# Patient Record
Sex: FEMALE | Race: WHITE | NOT HISPANIC OR LATINO | Employment: OTHER | URBAN - METROPOLITAN AREA
[De-identification: names, ages, dates, MRNs, and addresses within clinical notes are randomized per-mention and may not be internally consistent; named-entity substitution may affect disease eponyms.]

---

## 2021-02-01 ENCOUNTER — TRANSCRIBE ORDERS (OUTPATIENT)
Dept: PREADMISSION TESTING | Facility: HOSPITAL | Age: 76
End: 2021-02-01

## 2021-02-01 DIAGNOSIS — Z01.818 OTHER SPECIFIED PRE-OPERATIVE EXAMINATION: Primary | ICD-10-CM

## 2021-02-05 ENCOUNTER — HOSPITAL ENCOUNTER (OUTPATIENT)
Dept: GENERAL RADIOLOGY | Facility: HOSPITAL | Age: 76
Discharge: HOME | End: 2021-02-05

## 2021-02-05 ENCOUNTER — PRE-ADMISSION TESTING (OUTPATIENT)
Dept: PREADMISSION TESTING | Facility: HOSPITAL | Age: 76
End: 2021-02-05

## 2021-02-05 VITALS
HEART RATE: 98 BPM | OXYGEN SATURATION: 98 % | HEIGHT: 60 IN | WEIGHT: 118.4 LBS | BODY MASS INDEX: 23.25 KG/M2 | DIASTOLIC BLOOD PRESSURE: 85 MMHG | TEMPERATURE: 98.3 F | SYSTOLIC BLOOD PRESSURE: 141 MMHG | RESPIRATION RATE: 18 BRPM

## 2021-02-05 LAB
ALBUMIN SERPL-MCNC: 4.4 G/DL (ref 3.5–5.2)
ALBUMIN/GLOB SERPL: 1.5 G/DL
ALP SERPL-CCNC: 83 U/L (ref 39–117)
ALT SERPL W P-5'-P-CCNC: 15 U/L (ref 1–33)
ANION GAP SERPL CALCULATED.3IONS-SCNC: 12.8 MMOL/L (ref 5–15)
AST SERPL-CCNC: 31 U/L (ref 1–32)
BILIRUB SERPL-MCNC: 0.6 MG/DL (ref 0–1.2)
BUN SERPL-MCNC: 8 MG/DL (ref 8–23)
BUN/CREAT SERPL: 10.8 (ref 7–25)
CALCIUM SPEC-SCNC: 10.5 MG/DL (ref 8.6–10.5)
CHLORIDE SERPL-SCNC: 94 MMOL/L (ref 98–107)
CO2 SERPL-SCNC: 26.2 MMOL/L (ref 22–29)
CREAT SERPL-MCNC: 0.74 MG/DL (ref 0.57–1)
DEPRECATED RDW RBC AUTO: 40.3 FL (ref 37–54)
ERYTHROCYTE [DISTWIDTH] IN BLOOD BY AUTOMATED COUNT: 12.1 % (ref 12.3–15.4)
GFR SERPL CREATININE-BSD FRML MDRD: 76 ML/MIN/1.73
GLOBULIN UR ELPH-MCNC: 2.9 GM/DL
GLUCOSE SERPL-MCNC: 99 MG/DL (ref 65–99)
HBA1C MFR BLD: 5.4 % (ref 4.8–5.6)
HCT VFR BLD AUTO: 36.8 % (ref 34–46.6)
HGB BLD-MCNC: 11.8 G/DL (ref 12–15.9)
INR PPP: 0.91 (ref 0.9–1.1)
MCH RBC QN AUTO: 29.4 PG (ref 26.6–33)
MCHC RBC AUTO-ENTMCNC: 32.1 G/DL (ref 31.5–35.7)
MCV RBC AUTO: 91.5 FL (ref 79–97)
PLATELET # BLD AUTO: 402 10*3/MM3 (ref 140–450)
PMV BLD AUTO: 8.8 FL (ref 6–12)
POTASSIUM SERPL-SCNC: 4.6 MMOL/L (ref 3.5–5.2)
PROT SERPL-MCNC: 7.3 G/DL (ref 6–8.5)
PROTHROMBIN TIME: 12 SECONDS (ref 11.7–14.2)
RBC # BLD AUTO: 4.02 10*6/MM3 (ref 3.77–5.28)
SODIUM SERPL-SCNC: 133 MMOL/L (ref 136–145)
WBC # BLD AUTO: 7.33 10*3/MM3 (ref 3.4–10.8)

## 2021-02-05 PROCEDURE — 93005 ELECTROCARDIOGRAM TRACING: CPT

## 2021-02-05 PROCEDURE — 83036 HEMOGLOBIN GLYCOSYLATED A1C: CPT

## 2021-02-05 PROCEDURE — 80053 COMPREHEN METABOLIC PANEL: CPT

## 2021-02-05 PROCEDURE — 73502 X-RAY EXAM HIP UNI 2-3 VIEWS: CPT

## 2021-02-05 PROCEDURE — 93010 ELECTROCARDIOGRAM REPORT: CPT | Performed by: INTERNAL MEDICINE

## 2021-02-05 PROCEDURE — 71046 X-RAY EXAM CHEST 2 VIEWS: CPT

## 2021-02-05 PROCEDURE — 85610 PROTHROMBIN TIME: CPT

## 2021-02-05 PROCEDURE — 85027 COMPLETE CBC AUTOMATED: CPT

## 2021-02-05 PROCEDURE — 36415 COLL VENOUS BLD VENIPUNCTURE: CPT

## 2021-02-05 RX ORDER — LOVASTATIN 40 MG/1
40 TABLET ORAL DAILY
COMMUNITY
Start: 2021-01-07

## 2021-02-05 RX ORDER — CHLORHEXIDINE GLUCONATE 500 MG/1
1 CLOTH TOPICAL TAKE AS DIRECTED
COMMUNITY
End: 2021-02-25 | Stop reason: HOSPADM

## 2021-02-05 RX ORDER — AMOXICILLIN 500 MG/1
2000 CAPSULE ORAL TAKE AS DIRECTED
COMMUNITY
Start: 2021-01-19

## 2021-02-05 RX ORDER — LOSARTAN POTASSIUM 100 MG/1
100 TABLET ORAL DAILY
COMMUNITY
Start: 2021-01-07

## 2021-02-05 ASSESSMENT — HOOS JR
HOOS JR SCORE: 7
HOOS JR SCORE: 67.516

## 2021-02-05 NOTE — PAT
Here for PAT appt for scheduled RTHA with Dr Rouse.  Has had several prev surgeries incl LTKA in 2019. No issues with anesthesia.  States has been feeling well lately..No recent acute illnesses...GI or UR.  Denies CP, SOA, CORADO, palpitations or syncope.    PMH includes HTN, OA, hyperlipidemia, and prev tobacco use.  Quit smoking about 5 years ago.  Lives with  and daughter will assist after surgery.  Exam reveals pleasant elderly female appearing stated age.  Skin w/d and intact.  Chest CTA bilat.  S1S2 RRR.  No edema noted.  Prelim EKG shows NSR with slow R wave progression.  All other testing pending.    Updated 2/18/21  Obtained EKG from 2018 done at [facility] for a prev surgery.  This EKG is unchanged from concepcióne previous obtained.  May proceed with surgery.

## 2021-02-05 NOTE — DISCHARGE INSTRUCTIONS
Take the following medications the morning of surgery:  NONE    Arrive to hospital on your day of surgery at 5:15 AM.      If you are on prescription narcotic pain medication to control your pain you may also take that medication the morning of surgery.    General Instructions:  • Do not eat solid food after midnight the night before surgery.  • You may drink clear liquids day of surgery but must stop at least one hour before your hospital arrival time.  • It is beneficial for you to have a clear drink that contains carbohydrates the day of surgery.  We suggest a 12 to 20 ounce bottle of Gatorade or Powerade for non-diabetic patients or a 12 to 20 ounce bottle of G2 or Powerade Zero for diabetic patients. (Pediatric patients, are not advised to drink a 12 to 20 ounce carbohydrate drink)    Clear liquids are liquids you can see through.  Nothing red in color.     Plain water                               Sports drinks  Sodas                                   Gelatin (Jell-O)  Fruit juices without pulp such as white grape juice and apple juice  Popsicles that contain no fruit or yogurt  Tea or coffee (no cream or milk added)  Gatorade / Powerade  G2 / Powerade Zero    • Infants may have breast milk up to four hours before surgery.  • Infants drinking formula may drink formula up to six hours before surgery.   • Patients who avoid smoking, chewing tobacco and alcohol for 4 weeks prior to surgery have a reduced risk of post-operative complications.  Quit smoking as many days before surgery as you can.  • Do not smoke, use chewing tobacco or drink alcohol the day of surgery.   • If applicable bring your C-PAP/ BI-PAP machine.  • Bring any papers given to you in the doctor’s office.  • Wear clean comfortable clothes.  • Do not wear contact lenses, false eyelashes or make-up.  Bring a case for your glasses.   • Bring crutches or walker if applicable.  • Remove all piercings.  Leave jewelry and any other valuables at  home.  • Hair extensions with metal clips must be removed prior to surgery.  • The Pre-Admission Testing nurse will instruct you to bring medications if unable to obtain an accurate list in Pre-Admission Testing.        If you were given a blood bank ID arm band remember to bring it with you the day of surgery.    Preventing a Surgical Site Infection:  • For 2 to 3 days before surgery, avoid shaving with a razor because the razor can irritate skin and make it easier to develop an infection.    • Any areas of open skin can increase the risk of a post-operative wound infection by allowing bacteria to enter and travel throughout the body.  Notify your surgeon if you have any skin wounds / rashes even if it is not near the expected surgical site.  The area will need assessed to determine if surgery should be delayed until it is healed.  • The night prior to surgery shower using a fresh bar of anti-bacterial soap (such as Dial) and clean washcloth.  Sleep in a clean bed with clean clothing.  Do not allow pets to sleep with you.  • Shower on the morning of surgery using a fresh bar of anti-bacterial soap (such as Dial) and clean washcloth.  Dry with a clean towel and dress in clean clothing.  • Ask your surgeon if you will be receiving antibiotics prior to surgery.  • Make sure you, your family, and all healthcare providers clean their hands with soap and water or an alcohol based hand  before caring for you or your wound.    Day of surgery:  Your arrival time is approximately two hours before your scheduled surgery time.  Upon arrival, a Pre-op nurse and Anesthesiologist will review your health history, obtain vital signs, and answer questions you may have.  The only belongings needed at this time will be a list of your home medications and if applicable your C-PAP/BI-PAP machine.  A Pre-op nurse will start an IV and you may receive medication in preparation for surgery, including something to help you relax.      Please be aware that surgery does come with discomfort.  We want to make every effort to control your discomfort so please discuss any uncontrolled symptoms with your nurse.   Your doctor will most likely have prescribed pain medications.      If you are going home after surgery you will receive individualized written care instructions before being discharged.  A responsible adult must drive you to and from the hospital on the day of your surgery and stay with you for 24 hours.  Discharge prescriptions can be filled by the hospital pharmacy during regular pharmacy hours.  If you are having surgery late in the day/evening your prescription may be e-prescribed to your pharmacy.  Please verify your pharmacy hours or chose a 24 hour pharmacy to avoid not having access to your prescription because your pharmacy has closed for the day.    If you are staying overnight following surgery, you will be transported to your hospital room following the recovery period.  [facility] has all private rooms.    If you have any questions please call Pre-Admission Testing at [phone].  Deductibles and co-payments are collected on the day of service. Please be prepared to pay the required co-pay, deductible or deposit on the day of service as defined by your plan.    Patient Education for Self-Quarantine Process    Following your COVID testing, we strongly recommend that you do not leave your home after you have been tested for COVID except to get medical care. This includes not going to work, school or to public areas.  If this is not possible for you to do please limit your activities to only required outings.  Be sure to wear a mask when you are with other people, practice social distancing and wash your hands frequently.      The following items provide additional details to keep you safe.  • Wash your hands with soap and water frequently for at least 20 seconds.   • Avoid touching your eyes, nose and mouth  with unwashed hands.  • Do not share anything - utensils, towels, food from the same bowl.   • Have your own utensils, drinking glass, dishes, towels and bedding.   • Do not have visitors.   • Do use FaceTime to stay in touch with family and friends.  • You should stay in a specific room away from others if possible.   • Stay at least 6 feet away from others in the home if you cannot have a dedicated room to yourself.   • Do not snuggle with your pet. While the CDC says there is no evidence that pets can spread COVID-19 or be infected from humans, it is probably best to avoid “petting, snuggling, being kissed or licked and sharing food (during self-quarantine)”, according to the CDC.   • Sanitize household surfaces daily. Include all high touch areas (door handles, light switches, phones, countertops, etc.)  • Do not share a bathroom with others, if possible.   • Wear a mask around others in your home if you are unable to stay in a separate room or 6 feet apart. If  you are unable to wear a mask, have your family member wear a mask if they must be within 6 feet of you.   Call your surgeon immediately if you experience any of the following symptoms:  • Sore Throat  • Shortness of Breath or difficulty breathing  • Cough  • Chills  • Body soreness or muscle pain  • Headache  • Fever  • New loss of taste or smell  • Do not arrive for your surgery ill.  Your procedure will need to be rescheduled to another time.  You will need to call your physician before the day of surgery to avoid any unnecessary exposure to hospital staff as well as other patients.    CHLORHEXIDINE CLOTH INSTRUCTIONS  The morning of surgery follow these instructions using the Chlorhexidine cloths you've been given.  These steps reduce bacteria on the body.  Do not use the cloths near your eyes, ears mouth, genitalia or on open wounds.  Throw the cloths away after use but do not try to flush them down a toilet.      • Open and remove one cloth at a  time from the package.    • Leave the cloth unfolded and begin the bathing.  • Massage the skin with the cloths using gentle pressure to remove bacteria.  Do not scrub harshly.   • Follow the steps below with one 2% CHG cloth per area (6 total cloths).  • One cloth for neck, shoulders and chest.  • One cloth for both arms, hands, fingers and underarms (do underarms last).  • One cloth for the abdomen followed by groin.  • One cloth for right leg and foot including between the toes.  • One cloth for left leg and foot including between the toes.  • The last cloth is to be used for the back of the neck, back and buttocks.    Allow the CHG to air dry 3 minutes on the skin which will give it time to work and decrease the chance of irritation.  The skin may feel sticky until it is dry.  Do not rinse with water or any other liquid or you will lose the beneficial effects of the CHG.  If mild skin irritation occurs, do rinse the skin to remove the CHG.  Report this to the nurse at time of admission.  Do not apply lotions, creams, ointments, deodorants or perfumes after using the clothes. Dress in clean clothes before coming to the hospital.    BACTROBAN NASAL OINTMENT  There are many germs normally in your nose. Bactroban is an ointment that will help reduce these germs. Please follow these instructions for Bactroban use:      ____The day before surgery in the morning  Date________    ____The day before surgery in the evening              Date________    ____The day of surgery in the morning    Date________    **Squirt ½ package of Bactroban Ointment onto a cotton applicator and apply to inside of 1st nostril.  Squirt the remaining Bactroban and apply to the inside of the other nostril.

## 2021-02-06 LAB — QT INTERVAL: 350 MS

## 2021-02-19 DIAGNOSIS — Z01.818 PRE-OPERATIVE CLEARANCE: Primary | ICD-10-CM

## 2021-02-19 DIAGNOSIS — R94.31 ABNORMAL EKG: ICD-10-CM

## 2021-02-22 ENCOUNTER — HOSPITAL ENCOUNTER (OUTPATIENT)
Dept: CARDIOLOGY | Facility: HOSPITAL | Age: 76
Discharge: HOME | End: 2021-02-22

## 2021-02-22 ENCOUNTER — LAB (OUTPATIENT)
Dept: LAB | Facility: HOSPITAL | Age: 76
End: 2021-02-22

## 2021-02-22 ENCOUNTER — HOSPITAL ENCOUNTER (OUTPATIENT)
Dept: CARDIOLOGY | Facility: HOSPITAL | Age: 76
Discharge: HOME | End: 2021-02-22
Admitting: INTERNAL MEDICINE

## 2021-02-22 ENCOUNTER — OFFICE VISIT (OUTPATIENT)
Dept: CARDIOLOGY | Facility: CLINIC | Age: 76
End: 2021-02-22

## 2021-02-22 VITALS
SYSTOLIC BLOOD PRESSURE: 178 MMHG | DIASTOLIC BLOOD PRESSURE: 83 MMHG | HEART RATE: 97 BPM | BODY MASS INDEX: 22.58 KG/M2 | WEIGHT: 115 LBS | HEIGHT: 60 IN

## 2021-02-22 VITALS
WEIGHT: 118 LBS | DIASTOLIC BLOOD PRESSURE: 77 MMHG | HEART RATE: 90 BPM | BODY MASS INDEX: 23.16 KG/M2 | HEIGHT: 60 IN | SYSTOLIC BLOOD PRESSURE: 128 MMHG

## 2021-02-22 VITALS
HEART RATE: 90 BPM | DIASTOLIC BLOOD PRESSURE: 60 MMHG | BODY MASS INDEX: 23.16 KG/M2 | SYSTOLIC BLOOD PRESSURE: 130 MMHG | RESPIRATION RATE: 16 BRPM | HEIGHT: 60 IN | WEIGHT: 118 LBS | OXYGEN SATURATION: 100 %

## 2021-02-22 DIAGNOSIS — R94.31 ABNORMAL EKG: ICD-10-CM

## 2021-02-22 DIAGNOSIS — Z01.818 PRE-OPERATIVE CLEARANCE: ICD-10-CM

## 2021-02-22 DIAGNOSIS — Z01.818 OTHER SPECIFIED PRE-OPERATIVE EXAMINATION: ICD-10-CM

## 2021-02-22 DIAGNOSIS — Z01.810 PREOP CARDIOVASCULAR EXAM: Primary | ICD-10-CM

## 2021-02-22 LAB
AORTIC DIMENSIONLESS INDEX: 0.8 (DI)
ASCENDING AORTA: 2.7 CM
BH CV ECHO MEAS - ACS: 1.6 CM
BH CV ECHO MEAS - AO MAX PG (FULL): 3.3 MMHG
BH CV ECHO MEAS - AO MAX PG: 6.2 MMHG
BH CV ECHO MEAS - AO MEAN PG (FULL): 1.9 MMHG
BH CV ECHO MEAS - AO MEAN PG: 3.3 MMHG
BH CV ECHO MEAS - AO ROOT AREA (BSA CORRECTED): 1.9
BH CV ECHO MEAS - AO ROOT AREA: 6.1 CM^2
BH CV ECHO MEAS - AO ROOT DIAM: 2.8 CM
BH CV ECHO MEAS - AO V2 MAX: 124.9 CM/SEC
BH CV ECHO MEAS - AO V2 MEAN: 85.6 CM/SEC
BH CV ECHO MEAS - AO V2 VTI: 26 CM
BH CV ECHO MEAS - ASC AORTA: 2.7 CM
BH CV ECHO MEAS - AVA(I,A): 1.9 CM^2
BH CV ECHO MEAS - AVA(I,D): 1.9 CM^2
BH CV ECHO MEAS - AVA(V,A): 1.7 CM^2
BH CV ECHO MEAS - AVA(V,D): 1.7 CM^2
BH CV ECHO MEAS - BSA(HAYCOCK): 1.5 M^2
BH CV ECHO MEAS - BSA: 1.5 M^2
BH CV ECHO MEAS - BZI_BMI: 23 KILOGRAMS/M^2
BH CV ECHO MEAS - BZI_METRIC_HEIGHT: 152.4 CM
BH CV ECHO MEAS - BZI_METRIC_WEIGHT: 53.5 KG
BH CV ECHO MEAS - EDV(CUBED): 71.8 ML
BH CV ECHO MEAS - EDV(MOD-SP2): 46 ML
BH CV ECHO MEAS - EDV(MOD-SP4): 45 ML
BH CV ECHO MEAS - EDV(TEICH): 76.7 ML
BH CV ECHO MEAS - EF(CUBED): 75.1 %
BH CV ECHO MEAS - EF(MOD-BP): 66.2 %
BH CV ECHO MEAS - EF(MOD-SP2): 69.6 %
BH CV ECHO MEAS - EF(MOD-SP4): 66.7 %
BH CV ECHO MEAS - EF(TEICH): 67.5 %
BH CV ECHO MEAS - ESV(CUBED): 17.9 ML
BH CV ECHO MEAS - ESV(MOD-SP2): 14 ML
BH CV ECHO MEAS - ESV(MOD-SP4): 15 ML
BH CV ECHO MEAS - ESV(TEICH): 24.9 ML
BH CV ECHO MEAS - FS: 37.1 %
BH CV ECHO MEAS - IVS/LVPW: 0.94
BH CV ECHO MEAS - IVSD: 0.73 CM
BH CV ECHO MEAS - LAT PEAK E' VEL: 7.6 CM/SEC
BH CV ECHO MEAS - LV DIASTOLIC VOL/BSA (35-75): 30.2 ML/M^2
BH CV ECHO MEAS - LV MASS(C)D: 92.2 GRAMS
BH CV ECHO MEAS - LV MASS(C)DI: 61.8 GRAMS/M^2
BH CV ECHO MEAS - LV MAX PG: 2.9 MMHG
BH CV ECHO MEAS - LV MEAN PG: 1.4 MMHG
BH CV ECHO MEAS - LV SYSTOLIC VOL/BSA (12-30): 10.1 ML/M^2
BH CV ECHO MEAS - LV V1 MAX: 85.4 CM/SEC
BH CV ECHO MEAS - LV V1 MEAN: 55.8 CM/SEC
BH CV ECHO MEAS - LV V1 VTI: 19.7 CM
BH CV ECHO MEAS - LVIDD: 4.2 CM
BH CV ECHO MEAS - LVIDS: 2.6 CM
BH CV ECHO MEAS - LVLD AP2: 6.1 CM
BH CV ECHO MEAS - LVLD AP4: 6.1 CM
BH CV ECHO MEAS - LVLS AP2: 4.5 CM
BH CV ECHO MEAS - LVLS AP4: 5.1 CM
BH CV ECHO MEAS - LVOT AREA (M): 2.5 CM^2
BH CV ECHO MEAS - LVOT AREA: 2.5 CM^2
BH CV ECHO MEAS - LVOT DIAM: 1.8 CM
BH CV ECHO MEAS - LVPWD: 0.78 CM
BH CV ECHO MEAS - MED PEAK E' VEL: 6.6 CM/SEC
BH CV ECHO MEAS - MV A DUR: 0.11 SEC
BH CV ECHO MEAS - MV A MAX VEL: 98.5 CM/SEC
BH CV ECHO MEAS - MV DEC SLOPE: 411.2 CM/SEC^2
BH CV ECHO MEAS - MV DEC TIME: 182 SEC
BH CV ECHO MEAS - MV E MAX VEL: 67.4 CM/SEC
BH CV ECHO MEAS - MV E/A: 0.68
BH CV ECHO MEAS - MV MAX PG: 6.1 MMHG
BH CV ECHO MEAS - MV MEAN PG: 2.3 MMHG
BH CV ECHO MEAS - MV P1/2T MAX VEL: 99.1 CM/SEC
BH CV ECHO MEAS - MV P1/2T: 70.6 MSEC
BH CV ECHO MEAS - MV V2 MAX: 123.5 CM/SEC
BH CV ECHO MEAS - MV V2 MEAN: 71.1 CM/SEC
BH CV ECHO MEAS - MV V2 VTI: 17.6 CM
BH CV ECHO MEAS - MVA P1/2T LCG: 2.2 CM^2
BH CV ECHO MEAS - MVA(P1/2T): 3.1 CM^2
BH CV ECHO MEAS - MVA(VTI): 2.8 CM^2
BH CV ECHO MEAS - PA MAX PG (FULL): 1.4 MMHG
BH CV ECHO MEAS - PA MAX PG: 4.7 MMHG
BH CV ECHO MEAS - PA V2 MAX: 108.6 CM/SEC
BH CV ECHO MEAS - PULM A REVS DUR: 0.12 SEC
BH CV ECHO MEAS - PULM A REVS VEL: 38.8 CM/SEC
BH CV ECHO MEAS - PULM DIAS VEL: 37.8 CM/SEC
BH CV ECHO MEAS - PULM S/D: 1.6
BH CV ECHO MEAS - PULM SYS VEL: 59.2 CM/SEC
BH CV ECHO MEAS - PVA(V,A): 2 CM^2
BH CV ECHO MEAS - PVA(V,D): 2 CM^2
BH CV ECHO MEAS - QP/QS: 0.81
BH CV ECHO MEAS - RAP SYSTOLE: 3 MMHG
BH CV ECHO MEAS - RV MAX PG: 3.3 MMHG
BH CV ECHO MEAS - RV MEAN PG: 1.6 MMHG
BH CV ECHO MEAS - RV V1 MAX: 91.2 CM/SEC
BH CV ECHO MEAS - RV V1 MEAN: 58.5 CM/SEC
BH CV ECHO MEAS - RV V1 VTI: 17.3 CM
BH CV ECHO MEAS - RVOT AREA: 2.4 CM^2
BH CV ECHO MEAS - RVOT DIAM: 1.7 CM
BH CV ECHO MEAS - RVSP: 36 MMHG
BH CV ECHO MEAS - SI(AO): 106.6 ML/M^2
BH CV ECHO MEAS - SI(CUBED): 36.2 ML/M^2
BH CV ECHO MEAS - SI(LVOT): 33.6 ML/M^2
BH CV ECHO MEAS - SI(MOD-SP2): 21.5 ML/M^2
BH CV ECHO MEAS - SI(MOD-SP4): 20.1 ML/M^2
BH CV ECHO MEAS - SI(TEICH): 34.7 ML/M^2
BH CV ECHO MEAS - SV(AO): 159.1 ML
BH CV ECHO MEAS - SV(CUBED): 53.9 ML
BH CV ECHO MEAS - SV(LVOT): 50.1 ML
BH CV ECHO MEAS - SV(MOD-SP2): 32 ML
BH CV ECHO MEAS - SV(MOD-SP4): 30 ML
BH CV ECHO MEAS - SV(RVOT): 40.8 ML
BH CV ECHO MEAS - SV(TEICH): 51.7 ML
BH CV ECHO MEAS - TAPSE (>1.6): 2.2 CM
BH CV ECHO MEAS - TR MAX PG: 33 MMHG
BH CV ECHO MEAS - TR MAX VEL: 286.4 CM/SEC
BH CV ECHO MEASUREMENTS AVERAGE E/E' RATIO: 9.49
BH CV STRESS BP STAGE 1: NORMAL
BH CV STRESS COMMENTS STAGE 1: NORMAL
BH CV STRESS DOSE REGADENOSON STAGE 1: 0.4
BH CV STRESS DURATION MIN STAGE 1: 1
BH CV STRESS DURATION SEC STAGE 1: 0
BH CV STRESS HR STAGE 1: 129
BH CV STRESS O2 STAGE 1: 100
BH CV STRESS PROTOCOL 1: NORMAL
BH CV STRESS RECOVERY BP: NORMAL MMHG
BH CV STRESS RECOVERY HR: 102 BPM
BH CV STRESS RECOVERY O2: 100 %
BH CV STRESS STAGE 1: 1
BH CV XLRA - TDI S': 16.3 CM/SEC
LEFT ATRIUM VOLUME INDEX: 14.4 ML/M2
LV EF NUC BP: 60 %
MAXIMAL PREDICTED HEART RATE: 143 BPM
MAXIMAL PREDICTED HEART RATE: 143 BPM
PERCENT MAX PREDICTED HR: 90.21 %
SINUS: 2.4 CM
STJ: 2.2 CM
STRESS BASELINE BP: NORMAL MMHG
STRESS BASELINE HR: 89 BPM
STRESS O2 SAT REST: 100 %
STRESS PERCENT HR: 106 %
STRESS POST ESTIMATED WORKLOAD: 1 METS
STRESS POST EXERCISE DUR MIN: 1 MIN
STRESS POST EXERCISE DUR SEC: 0 SEC
STRESS POST O2 SAT PEAK: 100 %
STRESS POST PEAK BP: NORMAL MMHG
STRESS POST PEAK HR: 129 BPM
STRESS TARGET HR: 122 BPM
STRESS TARGET HR: 122 BPM

## 2021-02-22 PROCEDURE — 93306 TTE W/DOPPLER COMPLETE: CPT

## 2021-02-22 PROCEDURE — 93018 CV STRESS TEST I&R ONLY: CPT | Performed by: INTERNAL MEDICINE

## 2021-02-22 PROCEDURE — 78452 HT MUSCLE IMAGE SPECT MULT: CPT

## 2021-02-22 PROCEDURE — 25010000002 REGADENOSON 0.4 MG/5ML SOLUTION: Performed by: INTERNAL MEDICINE

## 2021-02-22 PROCEDURE — 93017 CV STRESS TEST TRACING ONLY: CPT

## 2021-02-22 PROCEDURE — U0004 COV-19 TEST NON-CDC HGH THRU: HCPCS

## 2021-02-22 PROCEDURE — 0 TECHNETIUM SESTAMIBI: Performed by: INTERNAL MEDICINE

## 2021-02-22 PROCEDURE — 99203 OFFICE O/P NEW LOW 30 MIN: CPT | Performed by: INTERNAL MEDICINE

## 2021-02-22 PROCEDURE — 93016 CV STRESS TEST SUPVJ ONLY: CPT | Performed by: INTERNAL MEDICINE

## 2021-02-22 PROCEDURE — C9803 HOPD COVID-19 SPEC COLLECT: HCPCS

## 2021-02-22 PROCEDURE — 93306 TTE W/DOPPLER COMPLETE: CPT | Performed by: INTERNAL MEDICINE

## 2021-02-22 PROCEDURE — A9500 TC99M SESTAMIBI: HCPCS | Performed by: INTERNAL MEDICINE

## 2021-02-22 PROCEDURE — 78452 HT MUSCLE IMAGE SPECT MULT: CPT | Performed by: INTERNAL MEDICINE

## 2021-02-22 RX ADMIN — REGADENOSON 0.4 MG: 0.08 INJECTION, SOLUTION INTRAVENOUS at 09:55

## 2021-02-22 RX ADMIN — TECHNETIUM TC 99M SESTAMIBI 1 DOSE: 1 INJECTION INTRAVENOUS at 09:55

## 2021-02-22 RX ADMIN — TECHNETIUM TC 99M SESTAMIBI 1 DOSE: 1 INJECTION INTRAVENOUS at 07:30

## 2021-02-22 NOTE — PROGRESS NOTES
"NP   RESULTS  CARDIAC CLEARANCE   Subjective:        Lavern Mcduffie is a 75 y.o. female who here for follow up    CC  SURG CLEARANCE  ABNORMAL EKG  HPI  75-year-old female here for the surgical clearance for the hip surgery has abnormal EKG denies any chest pains or tightness in the chest     Problems Addressed this Visit        Other    Preop cardiovascular exam - Primary    Abnormal EKG      Diagnoses       Codes Comments    Preop cardiovascular exam    -  Primary ICD-10-CM: Z01.810  ICD-9-CM: V72.81     Abnormal EKG     ICD-10-CM: R94.31  ICD-9-CM: 794.31         .    The following portions of the patient's history were reviewed and updated as appropriate: allergies, current medications, past family history, past medical history, past social history, past surgical history and problem list.    Past Medical History:   Diagnosis Date   • Arthritis    • Hyperlipidemia    • Hypertension    • Right hip pain      reports that she quit smoking about 5 years ago. Her smoking use included cigarettes. She has a 15.00 pack-year smoking history. She has never used smokeless tobacco. She reports current alcohol use. She reports that she does not use drugs.   Family History   Problem Relation Age of Onset   • Heart attack Father    • Heart disease Father    • Malig Hyperthermia Neg Hx        Review of Systems  Constitutional: No wt loss, fever, fatigue  Gastrointestinal: No nausea, abdominal pain  Behavioral/Psych: No insomnia or anxiety   Cardiovascular no chest pains or tightness in the chest  Objective:       Physical Exam  /83   Pulse 97   Ht 152.4 cm (60\")   Wt 52.2 kg (115 lb)   BMI 22.46 kg/m²   General appearance: No acute changes   Neck: Trachea midline; NECK, supple, no thyromegaly or lymphadenopathy   Lungs: Normal size and shape, normal breath sounds, equal distribution of air, no rales and rhonchi   CV: S1-S2 regular, no murmurs, no rub, no gallop   Abdomen: Soft, non-tender; no masses , no abnormal " abdominal sounds   Extremities: No deformity , normal color , no peripheral edema   Skin: Normal temperature, turgor and texture; no rash, ulcers          Procedures      Echocardiogram:        Current Outpatient Medications:   •  amoxicillin (AMOXIL) 500 MG capsule, Take 2,000 mg by mouth Take As Directed. PRIOR TO DENTAL APPT, Disp: , Rfl:   •  Chlorhexidine Gluconate Cloth 2 % pads, Apply 1 application topically Take As Directed. Use as directed prior to OR, Disp: , Rfl:   •  losartan (COZAAR) 100 MG tablet, Take 100 mg by mouth Daily., Disp: , Rfl:   •  lovastatin (MEVACOR) 40 MG tablet, Take 40 mg by mouth Daily., Disp: , Rfl:   •  mupirocin (BACTROBAN) 2 % nasal ointment, 1 application into the nostril(s) as directed by provider Take As Directed. Use as directed prior to OR, Disp: , Rfl:   No current facility-administered medications for this visit.    Assessment:        Patient Active Problem List   Diagnosis   • H/O total hip arthroplasty   • DJD (degenerative joint disease)   • Preop cardiovascular exam   • Abnormal EKG               Plan:            ICD-10-CM ICD-9-CM   1. Preop cardiovascular exam  Z01.810 V72.81   2. Abnormal EKG  R94.31 794.31     1. Preop cardiovascular exam  Stress test and echo normal    2. Abnormal EKG  Stress test and the echo normal       Lavern M Peytonchikakenneth seen and examined with no clinical signs of angina or chf, pt is cleared for surgery with non modifiable risk factors.  Lavern Mcduffie has been advised to take cardiac meds with sip of water on the day of surgery.    Please use beta blocker for tachycardia perioperatively    Anticoagulation to be managed appropriately    Watch for chest pain, shortness of breath, palpitations, arrhythmias, and significant change in the blood pressure perioperatively,     Please check EKG preop and postop if any questions, notify us if any change in patient's cardiovascular conditions    Specificity and sensitivity of the stress test/  cardiac workup has been explained. Pt has been explained if  Symptoms continue please go to ER, and further w/p will be required.    Also explained this does not rule out coronary artery disease or the future events, continue to emphasize on risk reductions for coronary artery disease    Pt also advised to contact PCP for other causes of symptoms    SEE IN 1 YR  COUNSELING:    Lavern Jimenezounseling was given to patient for the following topics: diagnostic results, risk factor reductions, impressions, risks and benefits of treatment options and importance of treatment compliance .       SMOKING COUNSELING:    [unfilled]    Dictated using Dragon dictation

## 2021-02-23 LAB — SARS-COV-2 ORF1AB RESP QL NAA+PROBE: NOT DETECTED

## 2021-02-24 ENCOUNTER — APPOINTMENT (OUTPATIENT)
Dept: GENERAL RADIOLOGY | Facility: HOSPITAL | Age: 76
End: 2021-02-24

## 2021-02-24 ENCOUNTER — HOSPITAL ENCOUNTER (OUTPATIENT)
Facility: HOSPITAL | Age: 76
Discharge: HOME HEALTH | End: 2021-02-25
Attending: ORTHOPAEDIC SURGERY | Admitting: ORTHOPAEDIC SURGERY

## 2021-02-24 ENCOUNTER — ANESTHESIA EVENT (OUTPATIENT)
Dept: PERIOP | Facility: HOSPITAL | Age: 76
End: 2021-02-24

## 2021-02-24 ENCOUNTER — ANESTHESIA (OUTPATIENT)
Dept: PERIOP | Facility: HOSPITAL | Age: 76
End: 2021-02-24

## 2021-02-24 DIAGNOSIS — Z96.641 HISTORY OF TOTAL RIGHT HIP REPLACEMENT: Primary | ICD-10-CM

## 2021-02-24 PROBLEM — M19.90 DJD (DEGENERATIVE JOINT DISEASE): Status: ACTIVE | Noted: 2021-02-24

## 2021-02-24 PROBLEM — Z96.649 H/O TOTAL HIP ARTHROPLASTY: Status: ACTIVE | Noted: 2021-02-24

## 2021-02-24 PROCEDURE — 25010000002 PHENYLEPHRINE PER 1 ML: Performed by: NURSE ANESTHETIST, CERTIFIED REGISTERED

## 2021-02-24 PROCEDURE — 25010000002 KETOROLAC TROMETHAMINE PER 15 MG: Performed by: ORTHOPAEDIC SURGERY

## 2021-02-24 PROCEDURE — 25010000002 EPINEPHRINE 30 MG/30ML SOLUTION: Performed by: ORTHOPAEDIC SURGERY

## 2021-02-24 PROCEDURE — C1889 IMPLANT/INSERT DEVICE, NOC: HCPCS | Performed by: ORTHOPAEDIC SURGERY

## 2021-02-24 PROCEDURE — 97161 PT EVAL LOW COMPLEX 20 MIN: CPT

## 2021-02-24 PROCEDURE — C1776 JOINT DEVICE (IMPLANTABLE): HCPCS | Performed by: ORTHOPAEDIC SURGERY

## 2021-02-24 PROCEDURE — 25010000002 DEXAMETHASONE PER 1 MG: Performed by: NURSE ANESTHETIST, CERTIFIED REGISTERED

## 2021-02-24 PROCEDURE — A9270 NON-COVERED ITEM OR SERVICE: HCPCS | Performed by: ORTHOPAEDIC SURGERY

## 2021-02-24 PROCEDURE — G0378 HOSPITAL OBSERVATION PER HR: HCPCS

## 2021-02-24 PROCEDURE — 97110 THERAPEUTIC EXERCISES: CPT

## 2021-02-24 PROCEDURE — C1713 ANCHOR/SCREW BN/BN,TIS/BN: HCPCS | Performed by: ORTHOPAEDIC SURGERY

## 2021-02-24 PROCEDURE — 25010000002 ONDANSETRON PER 1 MG: Performed by: NURSE ANESTHETIST, CERTIFIED REGISTERED

## 2021-02-24 PROCEDURE — 73501 X-RAY EXAM HIP UNI 1 VIEW: CPT

## 2021-02-24 PROCEDURE — 25010000003 CEFAZOLIN IN DEXTROSE 2-4 GM/100ML-% SOLUTION: Performed by: ORTHOPAEDIC SURGERY

## 2021-02-24 PROCEDURE — 63710000001 HYDROCODONE-ACETAMINOPHEN 7.5-325 MG TABLET: Performed by: ORTHOPAEDIC SURGERY

## 2021-02-24 PROCEDURE — 63710000001 POVIDONE-IODINE 10 % SOLUTION 15 ML BOTTLE: Performed by: ORTHOPAEDIC SURGERY

## 2021-02-24 PROCEDURE — 25010000002 PROPOFOL 10 MG/ML EMULSION: Performed by: NURSE ANESTHETIST, CERTIFIED REGISTERED

## 2021-02-24 PROCEDURE — 72170 X-RAY EXAM OF PELVIS: CPT

## 2021-02-24 PROCEDURE — 76000 FLUOROSCOPY <1 HR PHYS/QHP: CPT

## 2021-02-24 PROCEDURE — 25010000002 CLONIDINE PER 1 MG: Performed by: ORTHOPAEDIC SURGERY

## 2021-02-24 PROCEDURE — 25010000002 ROPIVACAINE PER 1 MG: Performed by: ORTHOPAEDIC SURGERY

## 2021-02-24 PROCEDURE — 63710000001 ASPIRIN 81 MG TABLET DELAYED-RELEASE: Performed by: ORTHOPAEDIC SURGERY

## 2021-02-24 DEVICE — AMISTEM-P STD STEM #1
Type: IMPLANTABLE DEVICE | Site: HIP | Status: FUNCTIONAL
Brand: AMISTEM-P

## 2021-02-24 DEVICE — KNOTLESS TISSUE CONTROL DEVICE, UNDYED UNIDIRECTIONAL (ANTIBACTERIAL) SYNTHETIC ABSORBABLE DEVICE
Type: IMPLANTABLE DEVICE | Site: HIP | Status: FUNCTIONAL
Brand: STRATAFIX

## 2021-02-24 DEVICE — IMPLANTABLE DEVICE: Type: IMPLANTABLE DEVICE | Site: HIP | Status: FUNCTIONAL

## 2021-02-24 DEVICE — FEMORAL HEAD Ø 36 SIZE M
Type: IMPLANTABLE DEVICE | Site: HIP | Status: FUNCTIONAL
Brand: MECTACER BIOLOX DELTA FEMORAL BALL HEAD

## 2021-02-24 DEVICE — ACETABULAR SHELL Ø54 TWO-HOLE
Type: IMPLANTABLE DEVICE | Site: HIP | Status: FUNCTIONAL
Brand: MPACT 3D METAL

## 2021-02-24 DEVICE — CANCELLOUS BONE SCREW FLAT HEAD Ø 6,5 L30
Type: IMPLANTABLE DEVICE | Site: HIP | Status: FUNCTIONAL
Brand: MPACT ACETABULAR SYSTEM

## 2021-02-24 DEVICE — FLAT PE  HC LINER Ø 36 / E
Type: IMPLANTABLE DEVICE | Site: HIP | Status: FUNCTIONAL
Brand: MPACT ACETABULAR SYSTEM

## 2021-02-24 DEVICE — VIOLET ANTIBACTERIAL POLYDIOXANONE, KNOTLESS TISSUE CONTROL DEVICE
Type: IMPLANTABLE DEVICE | Site: HIP | Status: FUNCTIONAL
Brand: STRATAFIX

## 2021-02-24 RX ORDER — FLUMAZENIL 0.1 MG/ML
0.2 INJECTION INTRAVENOUS AS NEEDED
Status: DISCONTINUED | OUTPATIENT
Start: 2021-02-24 | End: 2021-02-24 | Stop reason: HOSPADM

## 2021-02-24 RX ORDER — TRANEXAMIC ACID 100 MG/ML
INJECTION, SOLUTION INTRAVENOUS AS NEEDED
Status: DISCONTINUED | OUTPATIENT
Start: 2021-02-24 | End: 2021-02-24 | Stop reason: SURG

## 2021-02-24 RX ORDER — CEFAZOLIN SODIUM 2 G/100ML
2 INJECTION, SOLUTION INTRAVENOUS ONCE
Status: COMPLETED | OUTPATIENT
Start: 2021-02-24 | End: 2021-02-24

## 2021-02-24 RX ORDER — KETOROLAC TROMETHAMINE 30 MG/ML
15 INJECTION, SOLUTION INTRAMUSCULAR; INTRAVENOUS EVERY 6 HOURS
Status: COMPLETED | OUTPATIENT
Start: 2021-02-24 | End: 2021-02-25

## 2021-02-24 RX ORDER — SODIUM CHLORIDE 9 MG/ML
INJECTION, SOLUTION INTRAVENOUS CONTINUOUS PRN
Status: COMPLETED | OUTPATIENT
Start: 2021-02-24 | End: 2021-02-24

## 2021-02-24 RX ORDER — HYDRALAZINE HYDROCHLORIDE 20 MG/ML
5 INJECTION INTRAMUSCULAR; INTRAVENOUS
Status: DISCONTINUED | OUTPATIENT
Start: 2021-02-24 | End: 2021-02-24 | Stop reason: HOSPADM

## 2021-02-24 RX ORDER — HYDROCODONE BITARTRATE AND ACETAMINOPHEN 7.5; 325 MG/1; MG/1
2 TABLET ORAL EVERY 4 HOURS PRN
Status: DISCONTINUED | OUTPATIENT
Start: 2021-02-24 | End: 2021-02-25 | Stop reason: HOSPADM

## 2021-02-24 RX ORDER — SODIUM CHLORIDE 0.9 % (FLUSH) 0.9 %
3-10 SYRINGE (ML) INJECTION AS NEEDED
Status: DISCONTINUED | OUTPATIENT
Start: 2021-02-24 | End: 2021-02-25 | Stop reason: HOSPADM

## 2021-02-24 RX ORDER — MIDAZOLAM HYDROCHLORIDE 1 MG/ML
1 INJECTION INTRAMUSCULAR; INTRAVENOUS
Status: DISCONTINUED | OUTPATIENT
Start: 2021-02-24 | End: 2021-02-24 | Stop reason: HOSPADM

## 2021-02-24 RX ORDER — PROPOFOL 10 MG/ML
VIAL (ML) INTRAVENOUS CONTINUOUS PRN
Status: DISCONTINUED | OUTPATIENT
Start: 2021-02-24 | End: 2021-02-24 | Stop reason: SURG

## 2021-02-24 RX ORDER — TRAMADOL HYDROCHLORIDE 50 MG/1
50 TABLET ORAL EVERY 8 HOURS PRN
Status: DISCONTINUED | OUTPATIENT
Start: 2021-02-24 | End: 2021-02-25 | Stop reason: HOSPADM

## 2021-02-24 RX ORDER — MIDAZOLAM HYDROCHLORIDE 1 MG/ML
0.5 INJECTION INTRAMUSCULAR; INTRAVENOUS
Status: DISCONTINUED | OUTPATIENT
Start: 2021-02-24 | End: 2021-02-24 | Stop reason: HOSPADM

## 2021-02-24 RX ORDER — SODIUM CHLORIDE 0.9 % (FLUSH) 0.9 %
3 SYRINGE (ML) INJECTION EVERY 12 HOURS SCHEDULED
Status: DISCONTINUED | OUTPATIENT
Start: 2021-02-24 | End: 2021-02-25 | Stop reason: HOSPADM

## 2021-02-24 RX ORDER — ACETAMINOPHEN 325 MG/1
325 TABLET ORAL EVERY 4 HOURS PRN
Status: DISCONTINUED | OUTPATIENT
Start: 2021-02-24 | End: 2021-02-25 | Stop reason: HOSPADM

## 2021-02-24 RX ORDER — ASPIRIN 81 MG/1
81 TABLET ORAL EVERY 12 HOURS SCHEDULED
Status: DISCONTINUED | OUTPATIENT
Start: 2021-02-24 | End: 2021-02-25 | Stop reason: HOSPADM

## 2021-02-24 RX ORDER — FENTANYL CITRATE 50 UG/ML
50 INJECTION, SOLUTION INTRAMUSCULAR; INTRAVENOUS
Status: DISCONTINUED | OUTPATIENT
Start: 2021-02-24 | End: 2021-02-24 | Stop reason: HOSPADM

## 2021-02-24 RX ORDER — NALOXONE HCL 0.4 MG/ML
0.2 VIAL (ML) INJECTION AS NEEDED
Status: DISCONTINUED | OUTPATIENT
Start: 2021-02-24 | End: 2021-02-24 | Stop reason: HOSPADM

## 2021-02-24 RX ORDER — SODIUM CHLORIDE, SODIUM LACTATE, POTASSIUM CHLORIDE, CALCIUM CHLORIDE 600; 310; 30; 20 MG/100ML; MG/100ML; MG/100ML; MG/100ML
9 INJECTION, SOLUTION INTRAVENOUS CONTINUOUS PRN
Status: DISCONTINUED | OUTPATIENT
Start: 2021-02-24 | End: 2021-02-24 | Stop reason: HOSPADM

## 2021-02-24 RX ORDER — ONDANSETRON 4 MG/1
4 TABLET, FILM COATED ORAL EVERY 6 HOURS PRN
Status: DISCONTINUED | OUTPATIENT
Start: 2021-02-24 | End: 2021-02-25 | Stop reason: HOSPADM

## 2021-02-24 RX ORDER — SODIUM CHLORIDE 0.9 % (FLUSH) 0.9 %
10 SYRINGE (ML) INJECTION EVERY 12 HOURS SCHEDULED
Status: DISCONTINUED | OUTPATIENT
Start: 2021-02-24 | End: 2021-02-24 | Stop reason: HOSPADM

## 2021-02-24 RX ORDER — DEXAMETHASONE SODIUM PHOSPHATE 4 MG/ML
INJECTION, SOLUTION INTRA-ARTICULAR; INTRALESIONAL; INTRAMUSCULAR; INTRAVENOUS; SOFT TISSUE AS NEEDED
Status: DISCONTINUED | OUTPATIENT
Start: 2021-02-24 | End: 2021-02-24 | Stop reason: SURG

## 2021-02-24 RX ORDER — ONDANSETRON 2 MG/ML
4 INJECTION INTRAMUSCULAR; INTRAVENOUS EVERY 6 HOURS PRN
Status: DISCONTINUED | OUTPATIENT
Start: 2021-02-24 | End: 2021-02-25 | Stop reason: HOSPADM

## 2021-02-24 RX ORDER — CEFAZOLIN SODIUM 2 G/100ML
2 INJECTION, SOLUTION INTRAVENOUS EVERY 8 HOURS
Status: COMPLETED | OUTPATIENT
Start: 2021-02-24 | End: 2021-02-24

## 2021-02-24 RX ORDER — LABETALOL HYDROCHLORIDE 5 MG/ML
5 INJECTION, SOLUTION INTRAVENOUS
Status: DISCONTINUED | OUTPATIENT
Start: 2021-02-24 | End: 2021-02-24 | Stop reason: HOSPADM

## 2021-02-24 RX ORDER — MELOXICAM 7.5 MG/1
7.5 TABLET ORAL DAILY
Status: DISCONTINUED | OUTPATIENT
Start: 2021-02-25 | End: 2021-02-25 | Stop reason: HOSPADM

## 2021-02-24 RX ORDER — SODIUM CHLORIDE, SODIUM LACTATE, POTASSIUM CHLORIDE, CALCIUM CHLORIDE 600; 310; 30; 20 MG/100ML; MG/100ML; MG/100ML; MG/100ML
125 INJECTION, SOLUTION INTRAVENOUS CONTINUOUS
Status: DISCONTINUED | OUTPATIENT
Start: 2021-02-24 | End: 2021-02-25 | Stop reason: HOSPADM

## 2021-02-24 RX ORDER — OXYCODONE AND ACETAMINOPHEN 7.5; 325 MG/1; MG/1
1 TABLET ORAL ONCE AS NEEDED
Status: DISCONTINUED | OUTPATIENT
Start: 2021-02-24 | End: 2021-02-24 | Stop reason: HOSPADM

## 2021-02-24 RX ORDER — SODIUM CHLORIDE 0.9 % (FLUSH) 0.9 %
10 SYRINGE (ML) INJECTION AS NEEDED
Status: DISCONTINUED | OUTPATIENT
Start: 2021-02-24 | End: 2021-02-24 | Stop reason: HOSPADM

## 2021-02-24 RX ORDER — EPHEDRINE SULFATE 50 MG/ML
INJECTION, SOLUTION INTRAVENOUS AS NEEDED
Status: DISCONTINUED | OUTPATIENT
Start: 2021-02-24 | End: 2021-02-24 | Stop reason: SURG

## 2021-02-24 RX ORDER — DIPHENHYDRAMINE HCL 25 MG
25 CAPSULE ORAL
Status: DISCONTINUED | OUTPATIENT
Start: 2021-02-24 | End: 2021-02-24 | Stop reason: HOSPADM

## 2021-02-24 RX ORDER — HYDROMORPHONE HYDROCHLORIDE 1 MG/ML
0.5 INJECTION, SOLUTION INTRAMUSCULAR; INTRAVENOUS; SUBCUTANEOUS
Status: DISCONTINUED | OUTPATIENT
Start: 2021-02-24 | End: 2021-02-24 | Stop reason: HOSPADM

## 2021-02-24 RX ORDER — DIPHENHYDRAMINE HYDROCHLORIDE 50 MG/ML
12.5 INJECTION INTRAMUSCULAR; INTRAVENOUS
Status: DISCONTINUED | OUTPATIENT
Start: 2021-02-24 | End: 2021-02-24 | Stop reason: HOSPADM

## 2021-02-24 RX ORDER — FAMOTIDINE 10 MG/ML
20 INJECTION, SOLUTION INTRAVENOUS
Status: COMPLETED | OUTPATIENT
Start: 2021-02-24 | End: 2021-02-24

## 2021-02-24 RX ORDER — HYDROMORPHONE HYDROCHLORIDE 1 MG/ML
0.5 INJECTION, SOLUTION INTRAMUSCULAR; INTRAVENOUS; SUBCUTANEOUS
Status: DISCONTINUED | OUTPATIENT
Start: 2021-02-24 | End: 2021-02-25 | Stop reason: HOSPADM

## 2021-02-24 RX ORDER — HYDROCODONE BITARTRATE AND ACETAMINOPHEN 7.5; 325 MG/1; MG/1
1 TABLET ORAL ONCE AS NEEDED
Status: DISCONTINUED | OUTPATIENT
Start: 2021-02-24 | End: 2021-02-24 | Stop reason: HOSPADM

## 2021-02-24 RX ORDER — PROMETHAZINE HYDROCHLORIDE 25 MG/1
25 SUPPOSITORY RECTAL ONCE AS NEEDED
Status: DISCONTINUED | OUTPATIENT
Start: 2021-02-24 | End: 2021-02-24 | Stop reason: HOSPADM

## 2021-02-24 RX ORDER — ONDANSETRON 2 MG/ML
4 INJECTION INTRAMUSCULAR; INTRAVENOUS ONCE AS NEEDED
Status: DISCONTINUED | OUTPATIENT
Start: 2021-02-24 | End: 2021-02-24 | Stop reason: HOSPADM

## 2021-02-24 RX ORDER — ONDANSETRON 2 MG/ML
INJECTION INTRAMUSCULAR; INTRAVENOUS AS NEEDED
Status: DISCONTINUED | OUTPATIENT
Start: 2021-02-24 | End: 2021-02-24 | Stop reason: SURG

## 2021-02-24 RX ORDER — BUPIVACAINE HYDROCHLORIDE 7.5 MG/ML
INJECTION, SOLUTION EPIDURAL; RETROBULBAR
Status: COMPLETED | OUTPATIENT
Start: 2021-02-24 | End: 2021-02-24

## 2021-02-24 RX ORDER — EPHEDRINE SULFATE 50 MG/ML
5 INJECTION, SOLUTION INTRAVENOUS ONCE AS NEEDED
Status: DISCONTINUED | OUTPATIENT
Start: 2021-02-24 | End: 2021-02-24 | Stop reason: HOSPADM

## 2021-02-24 RX ORDER — HYDROCODONE BITARTRATE AND ACETAMINOPHEN 7.5; 325 MG/1; MG/1
1 TABLET ORAL EVERY 4 HOURS PRN
Status: DISCONTINUED | OUTPATIENT
Start: 2021-02-24 | End: 2021-02-25 | Stop reason: HOSPADM

## 2021-02-24 RX ORDER — MAGNESIUM HYDROXIDE 1200 MG/15ML
LIQUID ORAL AS NEEDED
Status: DISCONTINUED | OUTPATIENT
Start: 2021-02-24 | End: 2021-02-24 | Stop reason: HOSPADM

## 2021-02-24 RX ORDER — NALOXONE HCL 0.4 MG/ML
0.1 VIAL (ML) INJECTION
Status: DISCONTINUED | OUTPATIENT
Start: 2021-02-24 | End: 2021-02-25 | Stop reason: HOSPADM

## 2021-02-24 RX ORDER — PROMETHAZINE HYDROCHLORIDE 25 MG/1
25 TABLET ORAL ONCE AS NEEDED
Status: DISCONTINUED | OUTPATIENT
Start: 2021-02-24 | End: 2021-02-24 | Stop reason: HOSPADM

## 2021-02-24 RX ADMIN — KETOROLAC TROMETHAMINE 15 MG: 30 INJECTION, SOLUTION INTRAMUSCULAR at 18:28

## 2021-02-24 RX ADMIN — TRANEXAMIC ACID 1000 MG: 1 INJECTION, SOLUTION INTRAVENOUS at 08:46

## 2021-02-24 RX ADMIN — TRANEXAMIC ACID 1000 MG: 1 INJECTION, SOLUTION INTRAVENOUS at 07:14

## 2021-02-24 RX ADMIN — SODIUM CHLORIDE, POTASSIUM CHLORIDE, SODIUM LACTATE AND CALCIUM CHLORIDE 125 ML/HR: 600; 310; 30; 20 INJECTION, SOLUTION INTRAVENOUS at 12:29

## 2021-02-24 RX ADMIN — DEXAMETHASONE SODIUM PHOSPHATE 4 MG: 4 INJECTION, SOLUTION INTRAMUSCULAR; INTRAVENOUS at 07:38

## 2021-02-24 RX ADMIN — PHENYLEPHRINE HYDROCHLORIDE 100 MCG: 10 INJECTION INTRAVENOUS at 08:38

## 2021-02-24 RX ADMIN — CEFAZOLIN SODIUM 2 G: 2 INJECTION, SOLUTION INTRAVENOUS at 22:28

## 2021-02-24 RX ADMIN — HYDROCODONE BITARTRATE AND ACETAMINOPHEN 1 TABLET: 7.5; 325 TABLET ORAL at 15:19

## 2021-02-24 RX ADMIN — PHENYLEPHRINE HYDROCHLORIDE 100 MCG: 10 INJECTION INTRAVENOUS at 07:11

## 2021-02-24 RX ADMIN — PHENYLEPHRINE HYDROCHLORIDE 100 MCG: 10 INJECTION INTRAVENOUS at 08:11

## 2021-02-24 RX ADMIN — CEFAZOLIN SODIUM 2 G: 2 INJECTION, SOLUTION INTRAVENOUS at 15:16

## 2021-02-24 RX ADMIN — PHENYLEPHRINE HYDROCHLORIDE 100 MCG: 10 INJECTION INTRAVENOUS at 07:29

## 2021-02-24 RX ADMIN — HYDROCODONE BITARTRATE AND ACETAMINOPHEN 2 TABLET: 7.5; 325 TABLET ORAL at 19:09

## 2021-02-24 RX ADMIN — BUPIVACAINE HYDROCHLORIDE 1.8 ML: 7.5 INJECTION, SOLUTION EPIDURAL; RETROBULBAR at 07:17

## 2021-02-24 RX ADMIN — PHENYLEPHRINE HYDROCHLORIDE 100 MCG: 10 INJECTION INTRAVENOUS at 08:20

## 2021-02-24 RX ADMIN — CEFAZOLIN SODIUM 2 G: 2 INJECTION, SOLUTION INTRAVENOUS at 07:03

## 2021-02-24 RX ADMIN — PHENYLEPHRINE HYDROCHLORIDE 100 MCG: 10 INJECTION INTRAVENOUS at 07:38

## 2021-02-24 RX ADMIN — SODIUM CHLORIDE, PRESERVATIVE FREE 3 ML: 5 INJECTION INTRAVENOUS at 12:27

## 2021-02-24 RX ADMIN — ASPIRIN 81 MG: 81 TABLET, FILM COATED ORAL at 18:29

## 2021-02-24 RX ADMIN — HYDROCODONE BITARTRATE AND ACETAMINOPHEN 2 TABLET: 7.5; 325 TABLET ORAL at 23:09

## 2021-02-24 RX ADMIN — PHENYLEPHRINE HYDROCHLORIDE 100 MCG: 10 INJECTION INTRAVENOUS at 08:29

## 2021-02-24 RX ADMIN — SODIUM CHLORIDE, POTASSIUM CHLORIDE, SODIUM LACTATE AND CALCIUM CHLORIDE: 600; 310; 30; 20 INJECTION, SOLUTION INTRAVENOUS at 08:50

## 2021-02-24 RX ADMIN — KETOROLAC TROMETHAMINE 15 MG: 30 INJECTION, SOLUTION INTRAMUSCULAR at 12:26

## 2021-02-24 RX ADMIN — FAMOTIDINE 20 MG: 10 INJECTION INTRAVENOUS at 06:28

## 2021-02-24 RX ADMIN — ONDANSETRON 4 MG: 2 INJECTION INTRAMUSCULAR; INTRAVENOUS at 08:48

## 2021-02-24 RX ADMIN — PROPOFOL 75 MCG/KG/MIN: 10 INJECTION, EMULSION INTRAVENOUS at 07:07

## 2021-02-24 RX ADMIN — PHENYLEPHRINE HYDROCHLORIDE 100 MCG: 10 INJECTION INTRAVENOUS at 07:36

## 2021-02-24 RX ADMIN — PHENYLEPHRINE HYDROCHLORIDE 100 MCG: 10 INJECTION INTRAVENOUS at 07:59

## 2021-02-24 RX ADMIN — EPHEDRINE SULFATE 10 MG: 50 INJECTION INTRAVENOUS at 07:41

## 2021-02-24 RX ADMIN — SODIUM CHLORIDE, POTASSIUM CHLORIDE, SODIUM LACTATE AND CALCIUM CHLORIDE 9 ML/HR: 600; 310; 30; 20 INJECTION, SOLUTION INTRAVENOUS at 06:28

## 2021-02-24 NOTE — ANESTHESIA POSTPROCEDURE EVALUATION
"Patient: Lavern Mcduffie    Procedure Summary     Date: 02/24/21 Room / Location: [facility] OR [address] [place] [address] MAIN OR    Anesthesia Start: 0651 Anesthesia Stop: 0919    Procedure: RIGHT TOTAL HIP ARTHROPLASTY ANTERIOR WITH HANA TABLE (Right Hip) Diagnosis:     Surgeon: Sven Rouse MD Provider: Dejuan Friedman MD    Anesthesia Type: spinal ASA Status: 3          Anesthesia Type: spinal    Vitals  Vitals Value Taken Time   /68 02/24/21 0945   Temp 36.4 °C (97.6 °F) 02/24/21 0920   Pulse 92 02/24/21 0957   Resp 16 02/24/21 0945   SpO2 92 % 02/24/21 0957   Vitals shown include unvalidated device data.        Post Anesthesia Care and Evaluation    Patient location during evaluation: bedside  Patient participation: complete - patient participated  Level of consciousness: sleepy but conscious  Pain score: 0  Pain management: adequate  Airway patency: patent  Anesthetic complications: No anesthetic complications    Cardiovascular status: acceptable  Respiratory status: acceptable  Hydration status: acceptable    Comments: /68   Pulse 89   Temp 36.4 °C (97.6 °F) (Oral)   Resp 16   Ht 152.4 cm (60\")   Wt 52.7 kg (116 lb 3 oz)   SpO2 96%   BMI 22.69 kg/m²         "

## 2021-02-24 NOTE — ANESTHESIA PROCEDURE NOTES
Spinal Block      Performed By  Anesthesiologist: Dejuan Friedman MD  CRNA: Ese Wilson CRNA  Preanesthetic Checklist  Completed: patient identified, site marked, surgical consent, pre-op evaluation, timeout performed, IV checked, risks and benefits discussed and monitors and equipment checked  Spinal Block Prep:  Patient Position:sitting  Sterile Tech:cap, gloves and sterile barriers  Prep:Betadine  Patient Monitoring:EKG, continuous pulse oximetry and blood pressure monitoring  Spinal Block Procedure  Approach:midline  Guidance:palpation technique  Location:L3-L4  Needle Type:Pencan  Needle Gauge:24 G  Placement of Spinal needle event:cerebrospinal fluid aspirated  Paresthesia: no  Fluid Appearance:clear  Medications: bupivacaine PF (MARCAINE) 0.75 % injection, 1.8 mL   Post Assessment  Patient Tolerance:patient tolerated the procedure well with no apparent complications  Complications no

## 2021-02-25 VITALS
RESPIRATION RATE: 16 BRPM | OXYGEN SATURATION: 97 % | HEIGHT: 60 IN | SYSTOLIC BLOOD PRESSURE: 113 MMHG | TEMPERATURE: 97.6 F | WEIGHT: 116.19 LBS | HEART RATE: 79 BPM | DIASTOLIC BLOOD PRESSURE: 64 MMHG | BODY MASS INDEX: 22.81 KG/M2

## 2021-02-25 LAB
HCT VFR BLD AUTO: 28.9 % (ref 34–46.6)
HGB BLD-MCNC: 9.8 G/DL (ref 12–15.9)

## 2021-02-25 PROCEDURE — 63710000001 HYDROCODONE-ACETAMINOPHEN 7.5-325 MG TABLET: Performed by: ORTHOPAEDIC SURGERY

## 2021-02-25 PROCEDURE — A9270 NON-COVERED ITEM OR SERVICE: HCPCS | Performed by: ORTHOPAEDIC SURGERY

## 2021-02-25 PROCEDURE — 63710000001 MELOXICAM 7.5 MG TABLET: Performed by: ORTHOPAEDIC SURGERY

## 2021-02-25 PROCEDURE — 85014 HEMATOCRIT: CPT | Performed by: ORTHOPAEDIC SURGERY

## 2021-02-25 PROCEDURE — 97110 THERAPEUTIC EXERCISES: CPT

## 2021-02-25 PROCEDURE — 85018 HEMOGLOBIN: CPT | Performed by: ORTHOPAEDIC SURGERY

## 2021-02-25 PROCEDURE — 63710000001 ASPIRIN 81 MG TABLET DELAYED-RELEASE: Performed by: ORTHOPAEDIC SURGERY

## 2021-02-25 PROCEDURE — 25010000002 KETOROLAC TROMETHAMINE PER 15 MG: Performed by: ORTHOPAEDIC SURGERY

## 2021-02-25 RX ORDER — ASPIRIN 81 MG/1
81 TABLET ORAL EVERY 12 HOURS SCHEDULED
Qty: 60 TABLET | Refills: 0 | Status: SHIPPED | OUTPATIENT
Start: 2021-02-25 | End: 2021-03-27

## 2021-02-25 RX ORDER — MELOXICAM 7.5 MG/1
7.5 TABLET ORAL DAILY
Qty: 14 TABLET | Refills: 0 | Status: SHIPPED | OUTPATIENT
Start: 2021-02-25 | End: 2021-03-11

## 2021-02-25 RX ORDER — TRAMADOL HYDROCHLORIDE 50 MG/1
50 TABLET ORAL EVERY 8 HOURS PRN
Qty: 45 TABLET | Refills: 0 | Status: SHIPPED | OUTPATIENT
Start: 2021-02-25 | End: 2021-03-03

## 2021-02-25 RX ORDER — HYDROCODONE BITARTRATE AND ACETAMINOPHEN 7.5; 325 MG/1; MG/1
1 TABLET ORAL EVERY 6 HOURS PRN
Qty: 50 TABLET | Refills: 0 | Status: SHIPPED | OUTPATIENT
Start: 2021-02-25 | End: 2021-03-06

## 2021-02-25 RX ADMIN — ASPIRIN 81 MG: 81 TABLET, FILM COATED ORAL at 08:27

## 2021-02-25 RX ADMIN — KETOROLAC TROMETHAMINE 15 MG: 30 INJECTION, SOLUTION INTRAMUSCULAR at 00:00

## 2021-02-25 RX ADMIN — SODIUM CHLORIDE, PRESERVATIVE FREE 3 ML: 5 INJECTION INTRAVENOUS at 08:28

## 2021-02-25 RX ADMIN — MELOXICAM 7.5 MG: 7.5 TABLET ORAL at 08:27

## 2021-02-25 RX ADMIN — HYDROCODONE BITARTRATE AND ACETAMINOPHEN 1 TABLET: 7.5; 325 TABLET ORAL at 06:50

## 2021-02-25 RX ADMIN — KETOROLAC TROMETHAMINE 15 MG: 30 INJECTION, SOLUTION INTRAMUSCULAR at 08:27

## 2021-02-26 PROBLEM — R94.31 ABNORMAL EKG: Status: ACTIVE | Noted: 2021-02-26

## 2021-02-26 PROBLEM — Z01.810 PREOP CARDIOVASCULAR EXAM: Status: ACTIVE | Noted: 2021-02-26

## 2021-03-09 DIAGNOSIS — Z23 IMMUNIZATION DUE: ICD-10-CM

## 2022-07-05 ENCOUNTER — TRANSCRIBE ORDERS (OUTPATIENT)
Dept: ADMINISTRATIVE | Facility: HOSPITAL | Age: 77
End: 2022-07-05

## 2022-07-05 DIAGNOSIS — R63.4 UNINTENTIONAL WEIGHT LOSS: ICD-10-CM

## 2022-07-05 DIAGNOSIS — R17 JAUNDICE: Primary | ICD-10-CM

## 2022-07-07 ENCOUNTER — HOSPITAL ENCOUNTER (OUTPATIENT)
Dept: CT IMAGING | Facility: HOSPITAL | Age: 77
Discharge: HOME | End: 2022-07-07
Admitting: INTERNAL MEDICINE

## 2022-07-07 DIAGNOSIS — R63.4 UNINTENTIONAL WEIGHT LOSS: ICD-10-CM

## 2022-07-07 DIAGNOSIS — R17 JAUNDICE: ICD-10-CM

## 2022-07-07 PROCEDURE — 82565 ASSAY OF CREATININE: CPT

## 2022-07-07 PROCEDURE — 25010000002 IOPAMIDOL 61 % SOLUTION: Performed by: INTERNAL MEDICINE

## 2022-07-07 PROCEDURE — 74177 CT ABD & PELVIS W/CONTRAST: CPT

## 2022-07-07 RX ADMIN — IOPAMIDOL 85 ML: 612 INJECTION, SOLUTION INTRAVENOUS at 08:30

## 2022-07-08 LAB — CREAT BLDA-MCNC: 0.6 MG/DL (ref 0.6–1.3)

## 2023-01-01 ENCOUNTER — INPATIENT HOSPITAL (OUTPATIENT)
Dept: URBAN - METROPOLITAN AREA HOSPITAL 113 | Facility: HOSPITAL | Age: 78
End: 2023-01-01
Payer: MEDICARE

## 2023-01-01 ENCOUNTER — APPOINTMENT (OUTPATIENT)
Dept: NUCLEAR MEDICINE | Facility: HOSPITAL | Age: 78
DRG: 394 | End: 2023-01-01
Payer: MEDICARE

## 2023-01-01 ENCOUNTER — APPOINTMENT (OUTPATIENT)
Dept: CT IMAGING | Facility: HOSPITAL | Age: 78
DRG: 394 | End: 2023-01-01
Payer: MEDICARE

## 2023-01-01 ENCOUNTER — APPOINTMENT (OUTPATIENT)
Dept: CARDIOLOGY | Facility: HOSPITAL | Age: 78
DRG: 394 | End: 2023-01-01
Payer: MEDICARE

## 2023-01-01 ENCOUNTER — HOSPITAL ENCOUNTER (INPATIENT)
Facility: HOSPITAL | Age: 78
LOS: 4 days | DRG: 394 | End: 2023-08-18
Attending: EMERGENCY MEDICINE | Admitting: HOSPITALIST
Payer: MEDICARE

## 2023-01-01 ENCOUNTER — APPOINTMENT (OUTPATIENT)
Dept: GENERAL RADIOLOGY | Facility: HOSPITAL | Age: 78
DRG: 394 | End: 2023-01-01
Payer: MEDICARE

## 2023-01-01 ENCOUNTER — ANESTHESIA EVENT (OUTPATIENT)
Dept: PERIOP | Facility: HOSPITAL | Age: 78
DRG: 394 | End: 2023-01-01
Payer: MEDICARE

## 2023-01-01 ENCOUNTER — ANESTHESIA (OUTPATIENT)
Dept: PERIOP | Facility: HOSPITAL | Age: 78
DRG: 394 | End: 2023-01-01
Payer: MEDICARE

## 2023-01-01 VITALS
DIASTOLIC BLOOD PRESSURE: 66 MMHG | SYSTOLIC BLOOD PRESSURE: 113 MMHG | BODY MASS INDEX: 18.57 KG/M2 | OXYGEN SATURATION: 94 % | TEMPERATURE: 97.3 F | HEIGHT: 60 IN | WEIGHT: 94.58 LBS

## 2023-01-01 DIAGNOSIS — K55.9 VASCULAR DISORDER OF INTESTINE, UNSPECIFIED: ICD-10-CM

## 2023-01-01 DIAGNOSIS — R63.4 ABNORMAL WEIGHT LOSS: ICD-10-CM

## 2023-01-01 DIAGNOSIS — R11.10 VOMITING AND DIARRHEA: Primary | ICD-10-CM

## 2023-01-01 DIAGNOSIS — J90 PLEURAL EFFUSION, LEFT: ICD-10-CM

## 2023-01-01 DIAGNOSIS — R19.7 DIARRHEA, UNSPECIFIED: ICD-10-CM

## 2023-01-01 DIAGNOSIS — A04.0 ENTEROPATHOGENIC ESCHERICHIA COLI INFECTION: ICD-10-CM

## 2023-01-01 DIAGNOSIS — D72.829 ELEVATED WHITE BLOOD CELL COUNT, UNSPECIFIED: ICD-10-CM

## 2023-01-01 DIAGNOSIS — R11.0 NAUSEA: ICD-10-CM

## 2023-01-01 DIAGNOSIS — R10.9 UNSPECIFIED ABDOMINAL PAIN: ICD-10-CM

## 2023-01-01 DIAGNOSIS — D72.829 LEUKOCYTOSIS, UNSPECIFIED TYPE: ICD-10-CM

## 2023-01-01 DIAGNOSIS — E83.39 OTHER DISORDERS OF PHOSPHORUS METABOLISM: ICD-10-CM

## 2023-01-01 DIAGNOSIS — A04.72 ENTEROCOLITIS DUE TO CLOSTRIDIUM DIFFICILE, NOT SPECIFIED AS: ICD-10-CM

## 2023-01-01 DIAGNOSIS — R19.5 OTHER FECAL ABNORMALITIES: ICD-10-CM

## 2023-01-01 DIAGNOSIS — R19.7 VOMITING AND DIARRHEA: Primary | ICD-10-CM

## 2023-01-01 DIAGNOSIS — E87.20 LACTIC ACIDOSIS: ICD-10-CM

## 2023-01-01 LAB
25(OH)D3 SERPL-MCNC: 14.1 NG/ML (ref 30–100)
ADV 40+41 DNA STL QL NAA+NON-PROBE: NOT DETECTED
ALBUMIN SERPL-MCNC: 1.7 G/DL (ref 3.5–5.2)
ALBUMIN SERPL-MCNC: 1.9 G/DL (ref 3.5–5.2)
ALBUMIN SERPL-MCNC: 2 G/DL (ref 3.5–5.2)
ALBUMIN SERPL-MCNC: 2 G/DL (ref 3.5–5.2)
ALBUMIN SERPL-MCNC: 2.4 G/DL (ref 3.5–5.2)
ALBUMIN SERPL-MCNC: 2.6 G/DL (ref 3.5–5.2)
ALBUMIN/GLOB SERPL: 0.6 G/DL
ALBUMIN/GLOB SERPL: 0.6 G/DL
ALBUMIN/GLOB SERPL: 0.7 G/DL
ALP SERPL-CCNC: 157 U/L (ref 39–117)
ALP SERPL-CCNC: 180 U/L (ref 39–117)
ALP SERPL-CCNC: 186 U/L (ref 39–117)
ALT SERPL W P-5'-P-CCNC: 19 U/L (ref 1–33)
ALT SERPL W P-5'-P-CCNC: 22 U/L (ref 1–33)
ALT SERPL W P-5'-P-CCNC: 22 U/L (ref 1–33)
ANION GAP SERPL CALCULATED.3IONS-SCNC: 12.5 MMOL/L (ref 5–15)
ANION GAP SERPL CALCULATED.3IONS-SCNC: 14 MMOL/L (ref 5–15)
ANION GAP SERPL CALCULATED.3IONS-SCNC: 18 MMOL/L (ref 5–15)
ANION GAP SERPL CALCULATED.3IONS-SCNC: 22.6 MMOL/L (ref 5–15)
ANION GAP SERPL CALCULATED.3IONS-SCNC: 9 MMOL/L (ref 5–15)
ANION GAP SERPL CALCULATED.3IONS-SCNC: 9.3 MMOL/L (ref 5–15)
ANION GAP SERPL CALCULATED.3IONS-SCNC: 9.6 MMOL/L (ref 5–15)
ANION GAP SERPL CALCULATED.3IONS-SCNC: 9.6 MMOL/L (ref 5–15)
AORTIC DIMENSIONLESS INDEX: 0.6 (DI)
AST SERPL-CCNC: 29 U/L (ref 1–32)
AST SERPL-CCNC: 37 U/L (ref 1–32)
AST SERPL-CCNC: 44 U/L (ref 1–32)
ASTRO TYP 1-8 RNA STL QL NAA+NON-PROBE: NOT DETECTED
BACTERIA SPEC AEROBE CULT: NORMAL
BACTERIA SPEC AEROBE CULT: NORMAL
BASOPHILS # BLD AUTO: 0.05 10*3/MM3 (ref 0–0.2)
BASOPHILS # BLD AUTO: 0.06 10*3/MM3 (ref 0–0.2)
BASOPHILS # BLD AUTO: 0.06 10*3/MM3 (ref 0–0.2)
BASOPHILS NFR BLD AUTO: 0.2 % (ref 0–1.5)
BASOPHILS NFR BLD AUTO: 0.2 % (ref 0–1.5)
BASOPHILS NFR BLD AUTO: 0.3 % (ref 0–1.5)
BH CV ECHO MEAS - AO MAX PG: 5.6 MMHG
BH CV ECHO MEAS - AO MEAN PG: 2.6 MMHG
BH CV ECHO MEAS - AO V2 MAX: 117.9 CM/SEC
BH CV ECHO MEAS - AO V2 VTI: 18.6 CM
BH CV ECHO MEAS - AVA(I,D): 1.74 CM2
BH CV ECHO MEAS - EDV(CUBED): 51 ML
BH CV ECHO MEAS - EDV(MOD-SP2): 29 ML
BH CV ECHO MEAS - EDV(MOD-SP4): 40 ML
BH CV ECHO MEAS - EF(MOD-BP): 60.4 %
BH CV ECHO MEAS - EF(MOD-SP2): 62.1 %
BH CV ECHO MEAS - EF(MOD-SP4): 55 %
BH CV ECHO MEAS - ESV(CUBED): 25.4 ML
BH CV ECHO MEAS - ESV(MOD-SP2): 11 ML
BH CV ECHO MEAS - ESV(MOD-SP4): 18 ML
BH CV ECHO MEAS - FS: 20.7 %
BH CV ECHO MEAS - IVS/LVPW: 1.07 CM
BH CV ECHO MEAS - IVSD: 0.53 CM
BH CV ECHO MEAS - LAT PEAK E' VEL: 6.7 CM/SEC
BH CV ECHO MEAS - LV DIASTOLIC VOL/BSA (35-75): 29.1 CM2
BH CV ECHO MEAS - LV MASS(C)D: 46.6 GRAMS
BH CV ECHO MEAS - LV MAX PG: 3 MMHG
BH CV ECHO MEAS - LV MEAN PG: 1.43 MMHG
BH CV ECHO MEAS - LV SYSTOLIC VOL/BSA (12-30): 13.1 CM2
BH CV ECHO MEAS - LV V1 MAX: 86.4 CM/SEC
BH CV ECHO MEAS - LV V1 VTI: 11.5 CM
BH CV ECHO MEAS - LVIDD: 3.7 CM
BH CV ECHO MEAS - LVIDS: 2.9 CM
BH CV ECHO MEAS - LVOT AREA: 2.8 CM2
BH CV ECHO MEAS - LVOT DIAM: 1.89 CM
BH CV ECHO MEAS - LVPWD: 0.5 CM
BH CV ECHO MEAS - MED PEAK E' VEL: 4.8 CM/SEC
BH CV ECHO MEAS - MV A DUR: 0.18 SEC
BH CV ECHO MEAS - MV A MAX VEL: 113.1 CM/SEC
BH CV ECHO MEAS - MV DEC SLOPE: 559.6 CM/SEC2
BH CV ECHO MEAS - MV DEC TIME: 105 MSEC
BH CV ECHO MEAS - MV E MAX VEL: 86 CM/SEC
BH CV ECHO MEAS - MV E/A: 0.76
BH CV ECHO MEAS - MV MAX PG: 6.9 MMHG
BH CV ECHO MEAS - MV MEAN PG: 2.24 MMHG
BH CV ECHO MEAS - MV P1/2T: 31.6 MSEC
BH CV ECHO MEAS - MV V2 VTI: 17.7 CM
BH CV ECHO MEAS - MVA(P1/2T): 7 CM2
BH CV ECHO MEAS - MVA(VTI): 1.82 CM2
BH CV ECHO MEAS - PA ACC TIME: 0.08 SEC
BH CV ECHO MEAS - PA V2 MAX: 95 CM/SEC
BH CV ECHO MEAS - PULM A REVS DUR: 0.12 SEC
BH CV ECHO MEAS - PULM A REVS VEL: 51.4 CM/SEC
BH CV ECHO MEAS - PULM DIAS VEL: 48.4 CM/SEC
BH CV ECHO MEAS - PULM S/D: 1.29
BH CV ECHO MEAS - PULM SYS VEL: 62.6 CM/SEC
BH CV ECHO MEAS - QP/QS: 0.56
BH CV ECHO MEAS - RAP SYSTOLE: 3 MMHG
BH CV ECHO MEAS - RV MAX PG: 1.4 MMHG
BH CV ECHO MEAS - RV V1 MAX: 59.1 CM/SEC
BH CV ECHO MEAS - RV V1 VTI: 8.6 CM
BH CV ECHO MEAS - RVOT DIAM: 1.63 CM
BH CV ECHO MEAS - RVSP: 24.4 MMHG
BH CV ECHO MEAS - SI(MOD-SP2): 13.1 ML/M2
BH CV ECHO MEAS - SI(MOD-SP4): 16 ML/M2
BH CV ECHO MEAS - SV(LVOT): 32.2 ML
BH CV ECHO MEAS - SV(MOD-SP2): 18 ML
BH CV ECHO MEAS - SV(MOD-SP4): 22 ML
BH CV ECHO MEAS - SV(RVOT): 17.9 ML
BH CV ECHO MEAS - TAPSE (>1.6): 1.6 CM
BH CV ECHO MEAS - TR MAX PG: 21.4 MMHG
BH CV ECHO MEAS - TR MAX VEL: 231.3 CM/SEC
BH CV ECHO MEASUREMENTS AVERAGE E/E' RATIO: 14.96
BH CV REST NUCLEAR ISOTOPE DOSE: 9 MCI
BH CV STRESS COMMENTS STAGE 1: NORMAL
BH CV STRESS DOSE REGADENOSON STAGE 1: 0.4
BH CV STRESS DURATION MIN STAGE 1: 0
BH CV STRESS DURATION SEC STAGE 1: 10
BH CV STRESS NUCLEAR ISOTOPE DOSE: 29 MCI
BH CV STRESS PROTOCOL 1: NORMAL
BH CV STRESS RECOVERY BP: NORMAL MMHG
BH CV STRESS RECOVERY HR: 114 BPM
BH CV STRESS STAGE 1: 1
BH CV VAS SMA AORTA PSV: 53.7 CM/S
BH CV VAS SMA CELIAC DIST EDV: 47 CM/S
BH CV VAS SMA CELIAC DIST PSV: 137 CM/S
BH CV VAS SMA CELIAC ORIGIN EDV: 31 CM/S
BH CV VAS SMA CELIAC ORIGIN PSV: 133 CM/S
BH CV VAS SMA CELIAC PROX EDV: 17 CM/S
BH CV VAS SMA CELIAC PROX PSV: 143 CM/S
BH CV VAS SMA HEPATIC EDV: 29 CM/S
BH CV VAS SMA HEPATIC PSV: 133 CM/S
BH CV VAS SMA IMA EDV: 116 CM/S
BH CV VAS SMA IMA PSV: 391 CM/S
BH CV VAS SMA ORIGIN PSV: 0 CM/S
BH CV VAS SMA SMA DIST EDV: 18 CM/S
BH CV VAS SMA SMA DIST PSV: 32 CM/S
BH CV VAS SMA SMA MID EDV: 14 CM/S
BH CV VAS SMA SMA MID PSV: 31 CM/S
BH CV VAS SMA SMA PROX PSV: 0 CM/S
BH CV VAS SMA SPLENIC EDV: 42 CM/S
BH CV VAS SMA SPLENIC PSV: 166 CM/S
BH CV XLRA - RV BASE: 2.5 CM
BH CV XLRA - RV LENGTH: 5.5 CM
BH CV XLRA - RV MID: 1.67 CM
BH CV XLRA - TDI S': 15.3 CM/SEC
BILIRUB SERPL-MCNC: 0.2 MG/DL (ref 0–1.2)
BILIRUB SERPL-MCNC: 0.3 MG/DL (ref 0–1.2)
BILIRUB SERPL-MCNC: 0.4 MG/DL (ref 0–1.2)
BUN SERPL-MCNC: 14 MG/DL (ref 8–23)
BUN SERPL-MCNC: 15 MG/DL (ref 8–23)
BUN SERPL-MCNC: 15 MG/DL (ref 8–23)
BUN SERPL-MCNC: 20 MG/DL (ref 8–23)
BUN SERPL-MCNC: 22 MG/DL (ref 8–23)
BUN SERPL-MCNC: 27 MG/DL (ref 8–23)
BUN SERPL-MCNC: 31 MG/DL (ref 8–23)
BUN SERPL-MCNC: 32 MG/DL (ref 8–23)
BUN/CREAT SERPL: 12.8 (ref 7–25)
BUN/CREAT SERPL: 14.6 (ref 7–25)
BUN/CREAT SERPL: 17.1 (ref 7–25)
BUN/CREAT SERPL: 19.2 (ref 7–25)
BUN/CREAT SERPL: 19.6 (ref 7–25)
BUN/CREAT SERPL: 20.3 (ref 7–25)
BUN/CREAT SERPL: 22.1 (ref 7–25)
BUN/CREAT SERPL: 22.3 (ref 7–25)
C CAYETANENSIS DNA STL QL NAA+NON-PROBE: NOT DETECTED
C COLI+JEJ+UPSA DNA STL QL NAA+NON-PROBE: NOT DETECTED
C DIFF TOX GENS STL QL NAA+PROBE: NEGATIVE
CA-I BLD-MCNC: 3.6 MG/DL (ref 4.6–5.4)
CA-I SERPL ISE-MCNC: 0.89 MMOL/L (ref 1.15–1.35)
CALCIUM SPEC-SCNC: 5.8 MG/DL (ref 8.6–10.5)
CALCIUM SPEC-SCNC: 6.2 MG/DL (ref 8.6–10.5)
CALCIUM SPEC-SCNC: 6.2 MG/DL (ref 8.6–10.5)
CALCIUM SPEC-SCNC: 6.5 MG/DL (ref 8.6–10.5)
CALCIUM SPEC-SCNC: 7 MG/DL (ref 8.6–10.5)
CALCIUM SPEC-SCNC: 7.5 MG/DL (ref 8.6–10.5)
CALCIUM SPEC-SCNC: 7.8 MG/DL (ref 8.6–10.5)
CALCIUM SPEC-SCNC: 8.1 MG/DL (ref 8.6–10.5)
CHLORIDE SERPL-SCNC: 100 MMOL/L (ref 98–107)
CHLORIDE SERPL-SCNC: 107 MMOL/L (ref 98–107)
CHLORIDE SERPL-SCNC: 108 MMOL/L (ref 98–107)
CHLORIDE SERPL-SCNC: 109 MMOL/L (ref 98–107)
CHLORIDE SERPL-SCNC: 110 MMOL/L (ref 98–107)
CHLORIDE SERPL-SCNC: 112 MMOL/L (ref 98–107)
CHLORIDE SERPL-SCNC: 112 MMOL/L (ref 98–107)
CHLORIDE SERPL-SCNC: 114 MMOL/L (ref 98–107)
CHOLEST SERPL-MCNC: NORMAL MG/DL
CO2 SERPL-SCNC: 11.4 MMOL/L (ref 22–29)
CO2 SERPL-SCNC: 14 MMOL/L (ref 22–29)
CO2 SERPL-SCNC: 22.5 MMOL/L (ref 22–29)
CO2 SERPL-SCNC: 23 MMOL/L (ref 22–29)
CO2 SERPL-SCNC: 25.7 MMOL/L (ref 22–29)
CO2 SERPL-SCNC: 26.4 MMOL/L (ref 22–29)
CO2 SERPL-SCNC: 27 MMOL/L (ref 22–29)
CO2 SERPL-SCNC: 28.4 MMOL/L (ref 22–29)
CORTIS SERPL-MCNC: 104 MCG/DL
CREAT SERPL-MCNC: 0.78 MG/DL (ref 0.57–1)
CREAT SERPL-MCNC: 0.82 MG/DL (ref 0.57–1)
CREAT SERPL-MCNC: 1.02 MG/DL (ref 0.57–1)
CREAT SERPL-MCNC: 1.17 MG/DL (ref 0.57–1)
CREAT SERPL-MCNC: 1.21 MG/DL (ref 0.57–1)
CREAT SERPL-MCNC: 1.45 MG/DL (ref 0.57–1)
CREAT SERPL-MCNC: 1.51 MG/DL (ref 0.57–1)
CREAT SERPL-MCNC: 1.53 MG/DL (ref 0.57–1)
CRYPTOSP DNA STL QL NAA+NON-PROBE: NOT DETECTED
D-LACTATE SERPL-SCNC: 1.7 MMOL/L (ref 0.5–2)
D-LACTATE SERPL-SCNC: 2.2 MMOL/L (ref 0.5–2)
D-LACTATE SERPL-SCNC: 3.4 MMOL/L (ref 0.5–2)
DEPRECATED RDW RBC AUTO: 40.5 FL (ref 37–54)
DEPRECATED RDW RBC AUTO: 40.6 FL (ref 37–54)
DEPRECATED RDW RBC AUTO: 41 FL (ref 37–54)
DEPRECATED RDW RBC AUTO: 41.1 FL (ref 37–54)
DEPRECATED RDW RBC AUTO: 41.8 FL (ref 37–54)
DEPRECATED RDW RBC AUTO: 44.4 FL (ref 37–54)
DEPRECATED RDW RBC AUTO: 44.6 FL (ref 37–54)
E HISTOLYT DNA STL QL NAA+NON-PROBE: NOT DETECTED
EAEC PAA PLAS AGGR+AATA ST NAA+NON-PRB: NOT DETECTED
EC STX1+STX2 GENES STL QL NAA+NON-PROBE: NOT DETECTED
EGFRCR SERPLBLD CKD-EPI 2021: 34.5 ML/MIN/1.73
EGFRCR SERPLBLD CKD-EPI 2021: 35 ML/MIN/1.73
EGFRCR SERPLBLD CKD-EPI 2021: 36.8 ML/MIN/1.73
EGFRCR SERPLBLD CKD-EPI 2021: 45.7 ML/MIN/1.73
EGFRCR SERPLBLD CKD-EPI 2021: 47.6 ML/MIN/1.73
EGFRCR SERPLBLD CKD-EPI 2021: 56.1 ML/MIN/1.73
EGFRCR SERPLBLD CKD-EPI 2021: 72.9 ML/MIN/1.73
EGFRCR SERPLBLD CKD-EPI 2021: 77.4 ML/MIN/1.73
ELASTASE PANC STL-MCNT: <50 UG ELAST./G
EOSINOPHIL # BLD AUTO: 0 10*3/MM3 (ref 0–0.4)
EOSINOPHIL # BLD AUTO: 0 10*3/MM3 (ref 0–0.4)
EOSINOPHIL # BLD AUTO: 0.13 10*3/MM3 (ref 0–0.4)
EOSINOPHIL NFR BLD AUTO: 0 % (ref 0.3–6.2)
EOSINOPHIL NFR BLD AUTO: 0 % (ref 0.3–6.2)
EOSINOPHIL NFR BLD AUTO: 0.5 % (ref 0.3–6.2)
EPEC EAE GENE STL QL NAA+NON-PROBE: DETECTED
ERYTHROCYTE [DISTWIDTH] IN BLOOD BY AUTOMATED COUNT: 12.8 % (ref 12.3–15.4)
ERYTHROCYTE [DISTWIDTH] IN BLOOD BY AUTOMATED COUNT: 12.8 % (ref 12.3–15.4)
ERYTHROCYTE [DISTWIDTH] IN BLOOD BY AUTOMATED COUNT: 12.9 % (ref 12.3–15.4)
ERYTHROCYTE [DISTWIDTH] IN BLOOD BY AUTOMATED COUNT: 13 % (ref 12.3–15.4)
ERYTHROCYTE [DISTWIDTH] IN BLOOD BY AUTOMATED COUNT: 13 % (ref 12.3–15.4)
ERYTHROCYTE [DISTWIDTH] IN BLOOD BY AUTOMATED COUNT: 13.5 % (ref 12.3–15.4)
ERYTHROCYTE [DISTWIDTH] IN BLOOD BY AUTOMATED COUNT: 13.6 % (ref 12.3–15.4)
ETEC LTA+ST1A+ST1B TOX ST NAA+NON-PROBE: NOT DETECTED
G LAMBLIA DNA STL QL NAA+NON-PROBE: NOT DETECTED
GLOBULIN UR ELPH-MCNC: 2.7 GM/DL
GLOBULIN UR ELPH-MCNC: 3 GM/DL
GLOBULIN UR ELPH-MCNC: 3.2 GM/DL
GLUCOSE BLDC GLUCOMTR-MCNC: 54 MG/DL (ref 70–130)
GLUCOSE BLDC GLUCOMTR-MCNC: 68 MG/DL (ref 70–130)
GLUCOSE BLDC GLUCOMTR-MCNC: 79 MG/DL (ref 70–130)
GLUCOSE BLDC GLUCOMTR-MCNC: 98 MG/DL (ref 70–130)
GLUCOSE SERPL-MCNC: 105 MG/DL (ref 65–99)
GLUCOSE SERPL-MCNC: 124 MG/DL (ref 65–99)
GLUCOSE SERPL-MCNC: 131 MG/DL (ref 65–99)
GLUCOSE SERPL-MCNC: 238 MG/DL (ref 65–99)
GLUCOSE SERPL-MCNC: 72 MG/DL (ref 65–99)
GLUCOSE SERPL-MCNC: 79 MG/DL (ref 65–99)
GLUCOSE SERPL-MCNC: 87 MG/DL (ref 65–99)
GLUCOSE SERPL-MCNC: 88 MG/DL (ref 65–99)
HBA1C MFR BLD: 5.1 % (ref 4.8–5.6)
HCT VFR BLD AUTO: 26.8 % (ref 34–46.6)
HCT VFR BLD AUTO: 29.3 % (ref 34–46.6)
HCT VFR BLD AUTO: 30.1 % (ref 34–46.6)
HCT VFR BLD AUTO: 31 % (ref 34–46.6)
HCT VFR BLD AUTO: 31.1 % (ref 34–46.6)
HCT VFR BLD AUTO: 36.9 % (ref 34–46.6)
HCT VFR BLD AUTO: 37.7 % (ref 34–46.6)
HDLC SERPL-MCNC: NORMAL MG/DL
HGB BLD-MCNC: 10.1 G/DL (ref 12–15.9)
HGB BLD-MCNC: 10.5 G/DL (ref 12–15.9)
HGB BLD-MCNC: 10.6 G/DL (ref 12–15.9)
HGB BLD-MCNC: 12 G/DL (ref 12–15.9)
HGB BLD-MCNC: 12.4 G/DL (ref 12–15.9)
HGB BLD-MCNC: 8.9 G/DL (ref 12–15.9)
HGB BLD-MCNC: 9.7 G/DL (ref 12–15.9)
HYPOCHROMIA BLD QL: ABNORMAL
IMM GRANULOCYTES # BLD AUTO: 0.16 10*3/MM3 (ref 0–0.05)
IMM GRANULOCYTES # BLD AUTO: 0.17 10*3/MM3 (ref 0–0.05)
IMM GRANULOCYTES NFR BLD AUTO: 0.7 % (ref 0–0.5)
IMM GRANULOCYTES NFR BLD AUTO: 0.8 % (ref 0–0.5)
LACTOFERRIN STL QL LA: POSITIVE
LDLC SERPL CALC-MCNC: NORMAL MG/DL
LDLC/HDLC SERPL: NORMAL {RATIO}
LEFT ATRIUM VOLUME INDEX: 19.4 ML/M2
LEFT ATRIUM VOLUME: 27 ML
LIPASE SERPL-CCNC: 12 U/L (ref 13–60)
LV EF NUC BP: 70 %
LYMPHOCYTES # BLD AUTO: 0.31 10*3/MM3 (ref 0.7–3.1)
LYMPHOCYTES # BLD AUTO: 0.37 10*3/MM3 (ref 0.7–3.1)
LYMPHOCYTES # BLD AUTO: 0.54 10*3/MM3 (ref 0.7–3.1)
LYMPHOCYTES # BLD MANUAL: 0 10*3/MM3 (ref 0.7–3.1)
LYMPHOCYTES # BLD MANUAL: 0.21 10*3/MM3 (ref 0.7–3.1)
LYMPHOCYTES # BLD MANUAL: 0.24 10*3/MM3 (ref 0.7–3.1)
LYMPHOCYTES # BLD MANUAL: 0.44 10*3/MM3 (ref 0.7–3.1)
LYMPHOCYTES NFR BLD AUTO: 1.4 % (ref 19.6–45.3)
LYMPHOCYTES NFR BLD AUTO: 1.8 % (ref 19.6–45.3)
LYMPHOCYTES NFR BLD AUTO: 2.2 % (ref 19.6–45.3)
LYMPHOCYTES NFR BLD MANUAL: 1 % (ref 5–12)
LYMPHOCYTES NFR BLD MANUAL: 3.1 % (ref 5–12)
LYMPHOCYTES NFR BLD MANUAL: 3.2 % (ref 5–12)
MAGNESIUM SERPL-MCNC: 1.4 MG/DL (ref 1.6–2.4)
MAGNESIUM SERPL-MCNC: 1.5 MG/DL (ref 1.6–2.4)
MAGNESIUM SERPL-MCNC: 1.8 MG/DL (ref 1.6–2.4)
MAGNESIUM SERPL-MCNC: 2.1 MG/DL (ref 1.6–2.4)
MAGNESIUM SERPL-MCNC: 2.5 MG/DL (ref 1.6–2.4)
MAXIMAL PREDICTED HEART RATE: 141 BPM
MCH RBC QN AUTO: 28.9 PG (ref 26.6–33)
MCH RBC QN AUTO: 29 PG (ref 26.6–33)
MCH RBC QN AUTO: 29.1 PG (ref 26.6–33)
MCH RBC QN AUTO: 29.1 PG (ref 26.6–33)
MCH RBC QN AUTO: 29.7 PG (ref 26.6–33)
MCH RBC QN AUTO: 30 PG (ref 26.6–33)
MCH RBC QN AUTO: 30.3 PG (ref 26.6–33)
MCHC RBC AUTO-ENTMCNC: 32.5 G/DL (ref 31.5–35.7)
MCHC RBC AUTO-ENTMCNC: 32.9 G/DL (ref 31.5–35.7)
MCHC RBC AUTO-ENTMCNC: 33.1 G/DL (ref 31.5–35.7)
MCHC RBC AUTO-ENTMCNC: 33.2 G/DL (ref 31.5–35.7)
MCHC RBC AUTO-ENTMCNC: 33.6 G/DL (ref 31.5–35.7)
MCHC RBC AUTO-ENTMCNC: 33.9 G/DL (ref 31.5–35.7)
MCHC RBC AUTO-ENTMCNC: 34.1 G/DL (ref 31.5–35.7)
MCV RBC AUTO: 87.1 FL (ref 79–97)
MCV RBC AUTO: 87.2 FL (ref 79–97)
MCV RBC AUTO: 87.6 FL (ref 79–97)
MCV RBC AUTO: 88.3 FL (ref 79–97)
MCV RBC AUTO: 88.6 FL (ref 79–97)
MCV RBC AUTO: 89.3 FL (ref 79–97)
MCV RBC AUTO: 90.4 FL (ref 79–97)
MONOCYTES # BLD AUTO: 0.89 10*3/MM3 (ref 0.1–0.9)
MONOCYTES # BLD AUTO: 1.25 10*3/MM3 (ref 0.1–0.9)
MONOCYTES # BLD AUTO: 1.43 10*3/MM3 (ref 0.1–0.9)
MONOCYTES # BLD: 0.24 10*3/MM3 (ref 0.1–0.9)
MONOCYTES # BLD: 0.68 10*3/MM3 (ref 0.1–0.9)
MONOCYTES # BLD: 0.83 10*3/MM3 (ref 0.1–0.9)
MONOCYTES NFR BLD AUTO: 4 % (ref 5–12)
MONOCYTES NFR BLD AUTO: 5.2 % (ref 5–12)
MONOCYTES NFR BLD AUTO: 6.9 % (ref 5–12)
NEUTROPHILS # BLD AUTO: 20.47 10*3/MM3 (ref 1.7–7)
NEUTROPHILS # BLD AUTO: 20.93 10*3/MM3 (ref 1.7–7)
NEUTROPHILS # BLD AUTO: 23.27 10*3/MM3 (ref 1.7–7)
NEUTROPHILS # BLD AUTO: 25.15 10*3/MM3 (ref 1.7–7)
NEUTROPHILS NFR BLD AUTO: 17.82 10*3/MM3 (ref 1.7–7)
NEUTROPHILS NFR BLD AUTO: 21.07 10*3/MM3 (ref 1.7–7)
NEUTROPHILS NFR BLD AUTO: 21.91 10*3/MM3 (ref 1.7–7)
NEUTROPHILS NFR BLD AUTO: 86.5 % (ref 42.7–76)
NEUTROPHILS NFR BLD AUTO: 91.2 % (ref 42.7–76)
NEUTROPHILS NFR BLD AUTO: 93.6 % (ref 42.7–76)
NEUTROPHILS NFR BLD MANUAL: 94.9 % (ref 42.7–76)
NEUTROPHILS NFR BLD MANUAL: 96.8 % (ref 42.7–76)
NEUTROPHILS NFR BLD MANUAL: 98 % (ref 42.7–76)
NEUTROPHILS NFR BLD MANUAL: 99 % (ref 42.7–76)
NOROVIRUS GI+II RNA STL QL NAA+NON-PROBE: NOT DETECTED
NRBC BLD AUTO-RTO: 0 /100 WBC (ref 0–0.2)
NT-PROBNP SERPL-MCNC: ABNORMAL PG/ML (ref 0–1800)
OVALOCYTES BLD QL SMEAR: ABNORMAL
P SHIGELLOIDES DNA STL QL NAA+NON-PROBE: NOT DETECTED
PERCENT MAX PREDICTED HR: 90.78 %
PHOSPHATE SERPL-MCNC: 1.4 MG/DL (ref 2.5–4.5)
PHOSPHATE SERPL-MCNC: 2.9 MG/DL (ref 2.5–4.5)
PHOSPHATE SERPL-MCNC: 3.4 MG/DL (ref 2.5–4.5)
PHOSPHATE SERPL-MCNC: 4.4 MG/DL (ref 2.5–4.5)
PHOSPHATE SERPL-MCNC: 4.6 MG/DL (ref 2.5–4.5)
PHOSPHATE SERPL-MCNC: NORMAL MG/DL
PLAT MORPH BLD: NORMAL
PLATELET # BLD AUTO: 193 10*3/MM3 (ref 140–450)
PLATELET # BLD AUTO: 195 10*3/MM3 (ref 140–450)
PLATELET # BLD AUTO: 204 10*3/MM3 (ref 140–450)
PLATELET # BLD AUTO: 218 10*3/MM3 (ref 140–450)
PLATELET # BLD AUTO: 231 10*3/MM3 (ref 140–450)
PLATELET # BLD AUTO: 279 10*3/MM3 (ref 140–450)
PLATELET # BLD AUTO: 284 10*3/MM3 (ref 140–450)
PMV BLD AUTO: 10 FL (ref 6–12)
PMV BLD AUTO: 9.2 FL (ref 6–12)
PMV BLD AUTO: 9.2 FL (ref 6–12)
PMV BLD AUTO: 9.4 FL (ref 6–12)
PMV BLD AUTO: 9.4 FL (ref 6–12)
PMV BLD AUTO: 9.6 FL (ref 6–12)
PMV BLD AUTO: 9.9 FL (ref 6–12)
POIKILOCYTOSIS BLD QL SMEAR: ABNORMAL
POIKILOCYTOSIS BLD QL SMEAR: ABNORMAL
POLYCHROMASIA BLD QL SMEAR: ABNORMAL
POTASSIUM SERPL-SCNC: 2.2 MMOL/L (ref 3.5–5.2)
POTASSIUM SERPL-SCNC: 3.1 MMOL/L (ref 3.5–5.2)
POTASSIUM SERPL-SCNC: 3.2 MMOL/L (ref 3.5–5.2)
POTASSIUM SERPL-SCNC: 3.3 MMOL/L (ref 3.5–5.2)
POTASSIUM SERPL-SCNC: 3.7 MMOL/L (ref 3.5–5.2)
POTASSIUM SERPL-SCNC: 3.9 MMOL/L (ref 3.5–5.2)
POTASSIUM SERPL-SCNC: 4 MMOL/L (ref 3.5–5.2)
POTASSIUM SERPL-SCNC: 4.8 MMOL/L (ref 3.5–5.2)
PROT SERPL-MCNC: 4.4 G/DL (ref 6–8.5)
PROT SERPL-MCNC: 5 G/DL (ref 6–8.5)
PROT SERPL-MCNC: 5.2 G/DL (ref 6–8.5)
QT INTERVAL: 393 MS
RBC # BLD AUTO: 3.06 10*6/MM3 (ref 3.77–5.28)
RBC # BLD AUTO: 3.33 10*6/MM3 (ref 3.77–5.28)
RBC # BLD AUTO: 3.36 10*6/MM3 (ref 3.77–5.28)
RBC # BLD AUTO: 3.5 10*6/MM3 (ref 3.77–5.28)
RBC # BLD AUTO: 3.57 10*6/MM3 (ref 3.77–5.28)
RBC # BLD AUTO: 4.13 10*6/MM3 (ref 3.77–5.28)
RBC # BLD AUTO: 4.27 10*6/MM3 (ref 3.77–5.28)
RBC MORPH BLD: NORMAL
RVA RNA STL QL NAA+NON-PROBE: NOT DETECTED
S ENT+BONG DNA STL QL NAA+NON-PROBE: NOT DETECTED
SAPO I+II+IV+V RNA STL QL NAA+NON-PROBE: NOT DETECTED
SHIGELLA SP+EIEC IPAH ST NAA+NON-PROBE: NOT DETECTED
SINUS: 2.9 CM
SODIUM SERPL-SCNC: 134 MMOL/L (ref 136–145)
SODIUM SERPL-SCNC: 143 MMOL/L (ref 136–145)
SODIUM SERPL-SCNC: 144 MMOL/L (ref 136–145)
SODIUM SERPL-SCNC: 145 MMOL/L (ref 136–145)
SODIUM SERPL-SCNC: 145 MMOL/L (ref 136–145)
SODIUM SERPL-SCNC: 147 MMOL/L (ref 136–145)
SODIUM SERPL-SCNC: 148 MMOL/L (ref 136–145)
SODIUM SERPL-SCNC: 149 MMOL/L (ref 136–145)
STRESS BASELINE BP: NORMAL MMHG
STRESS BASELINE HR: 106 BPM
STRESS PERCENT HR: 107 %
STRESS POST PEAK BP: NORMAL MMHG
STRESS POST PEAK HR: 128 BPM
STRESS TARGET HR: 120 BPM
TRIGL SERPL-MCNC: NORMAL MG/DL
TSH SERPL DL<=0.05 MIU/L-ACNC: NORMAL M[IU]/L
V CHOL+PARA+VUL DNA STL QL NAA+NON-PROBE: NOT DETECTED
V CHOLERAE DNA STL QL NAA+NON-PROBE: NOT DETECTED
VARIANT LYMPHS NFR BLD MANUAL: 0 % (ref 19.6–45.3)
VARIANT LYMPHS NFR BLD MANUAL: 1 % (ref 19.6–45.3)
VARIANT LYMPHS NFR BLD MANUAL: 1 % (ref 19.6–45.3)
VARIANT LYMPHS NFR BLD MANUAL: 2 % (ref 19.6–45.3)
VLDLC SERPL-MCNC: NORMAL MG/DL
WBC MORPH BLD: NORMAL
WBC NRBC COR # BLD: 20.61 10*3/MM3 (ref 3.4–10.8)
WBC NRBC COR # BLD: 20.68 10*3/MM3 (ref 3.4–10.8)
WBC NRBC COR # BLD: 22.06 10*3/MM3 (ref 3.4–10.8)
WBC NRBC COR # BLD: 22.49 10*3/MM3 (ref 3.4–10.8)
WBC NRBC COR # BLD: 23.74 10*3/MM3 (ref 3.4–10.8)
WBC NRBC COR # BLD: 24.05 10*3/MM3 (ref 3.4–10.8)
WBC NRBC COR # BLD: 25.98 10*3/MM3 (ref 3.4–10.8)
Y ENTEROCOL DNA STL QL NAA+NON-PROBE: NOT DETECTED

## 2023-01-01 PROCEDURE — 25010000002 DEXAMETHASONE PER 1 MG: Performed by: ANESTHESIOLOGY

## 2023-01-01 PROCEDURE — 85007 BL SMEAR W/DIFF WBC COUNT: CPT | Performed by: HOSPITALIST

## 2023-01-01 PROCEDURE — 0 POTASSIUM CHLORIDE 10 MEQ/100ML SOLUTION: Performed by: INTERNAL MEDICINE

## 2023-01-01 PROCEDURE — G0378 HOSPITAL OBSERVATION PER HR: HCPCS

## 2023-01-01 PROCEDURE — B41B1ZZ FLUOROSCOPY OF OTHER INTRA-ABDOMINAL ARTERIES USING LOW OSMOLAR CONTRAST: ICD-10-PCS | Performed by: SURGERY

## 2023-01-01 PROCEDURE — 80061 LIPID PANEL: CPT | Performed by: HOSPITALIST

## 2023-01-01 PROCEDURE — 99232 SBSQ HOSP IP/OBS MODERATE 35: CPT

## 2023-01-01 PROCEDURE — 25010000002 MAGNESIUM SULFATE 2 GM/50ML SOLUTION: Performed by: INTERNAL MEDICINE

## 2023-01-01 PROCEDURE — 93005 ELECTROCARDIOGRAM TRACING: CPT | Performed by: INTERNAL MEDICINE

## 2023-01-01 PROCEDURE — 99232 SBSQ HOSP IP/OBS MODERATE 35: CPT | Performed by: NURSE PRACTITIONER

## 2023-01-01 PROCEDURE — 25010000002 ONDANSETRON PER 1 MG: Performed by: EMERGENCY MEDICINE

## 2023-01-01 PROCEDURE — 25010000002 HEPARIN (PORCINE) PER 1000 UNITS: Performed by: SURGERY

## 2023-01-01 PROCEDURE — 25010000002 CALCIUM GLUCONATE 2-0.675 GM/100ML-% SOLUTION: Performed by: HOSPITALIST

## 2023-01-01 PROCEDURE — 82330 ASSAY OF CALCIUM: CPT | Performed by: INTERNAL MEDICINE

## 2023-01-01 PROCEDURE — 85007 BL SMEAR W/DIFF WBC COUNT: CPT | Performed by: SURGERY

## 2023-01-01 PROCEDURE — 25510000001 IOPAMIDOL PER 1 ML: Performed by: HOSPITALIST

## 2023-01-01 PROCEDURE — 83630 LACTOFERRIN FECAL (QUAL): CPT | Performed by: INTERNAL MEDICINE

## 2023-01-01 PROCEDURE — 25010000002 HYDROMORPHONE PER 4 MG: Performed by: HOSPITALIST

## 2023-01-01 PROCEDURE — 82948 REAGENT STRIP/BLOOD GLUCOSE: CPT

## 2023-01-01 PROCEDURE — 25010000002 PIPERACILLIN SOD-TAZOBACTAM PER 1 G: Performed by: HOSPITALIST

## 2023-01-01 PROCEDURE — 84100 ASSAY OF PHOSPHORUS: CPT | Performed by: INTERNAL MEDICINE

## 2023-01-01 PROCEDURE — 80053 COMPREHEN METABOLIC PANEL: CPT | Performed by: HOSPITALIST

## 2023-01-01 PROCEDURE — 25010000002 HYDROMORPHONE PER 4 MG: Performed by: ANESTHESIOLOGY

## 2023-01-01 PROCEDURE — 36415 COLL VENOUS BLD VENIPUNCTURE: CPT | Performed by: EMERGENCY MEDICINE

## 2023-01-01 PROCEDURE — 80048 BASIC METABOLIC PNL TOTAL CA: CPT | Performed by: INTERNAL MEDICINE

## 2023-01-01 PROCEDURE — 78452 HT MUSCLE IMAGE SPECT MULT: CPT

## 2023-01-01 PROCEDURE — 25010000002 ONDANSETRON PER 1 MG: Performed by: ANESTHESIOLOGY

## 2023-01-01 PROCEDURE — 25010000002 HYDROMORPHONE PER 4 MG: Performed by: SURGERY

## 2023-01-01 PROCEDURE — 85025 COMPLETE CBC W/AUTO DIFF WBC: CPT | Performed by: HOSPITALIST

## 2023-01-01 PROCEDURE — 93018 CV STRESS TEST I&R ONLY: CPT | Performed by: INTERNAL MEDICINE

## 2023-01-01 PROCEDURE — P9041 ALBUMIN (HUMAN),5%, 50ML: HCPCS | Performed by: ANESTHESIOLOGY

## 2023-01-01 PROCEDURE — 85025 COMPLETE CBC W/AUTO DIFF WBC: CPT | Performed by: SURGERY

## 2023-01-01 PROCEDURE — 75716 ARTERY X-RAYS ARMS/LEGS: CPT

## 2023-01-01 PROCEDURE — 25010000002 PHENYLEPHRINE 10 MG/ML SOLUTION 5 ML VIAL: Performed by: ANESTHESIOLOGY

## 2023-01-01 PROCEDURE — B41D1ZZ FLUOROSCOPY OF AORTA AND BILATERAL LOWER EXTREMITY ARTERIES USING LOW OSMOLAR CONTRAST: ICD-10-PCS | Performed by: SURGERY

## 2023-01-01 PROCEDURE — 83735 ASSAY OF MAGNESIUM: CPT | Performed by: INTERNAL MEDICINE

## 2023-01-01 PROCEDURE — 25010000002 ONDANSETRON PER 1 MG: Performed by: HOSPITALIST

## 2023-01-01 PROCEDURE — 25010000002 VASOPRESSIN 20 UNIT/ML SOLUTION: Performed by: NURSE ANESTHETIST, CERTIFIED REGISTERED

## 2023-01-01 PROCEDURE — 25010000002 CALCIUM GLUCONATE-NACL 1-0.675 GM/50ML-% SOLUTION: Performed by: HOSPITALIST

## 2023-01-01 PROCEDURE — 25010000002 SUGAMMADEX 200 MG/2ML SOLUTION: Performed by: ANESTHESIOLOGY

## 2023-01-01 PROCEDURE — 99222 1ST HOSP IP/OBS MODERATE 55: CPT | Performed by: INTERNAL MEDICINE

## 2023-01-01 PROCEDURE — 83605 ASSAY OF LACTIC ACID: CPT | Performed by: EMERGENCY MEDICINE

## 2023-01-01 PROCEDURE — 80069 RENAL FUNCTION PANEL: CPT | Performed by: SURGERY

## 2023-01-01 PROCEDURE — 85025 COMPLETE CBC W/AUTO DIFF WBC: CPT | Performed by: EMERGENCY MEDICINE

## 2023-01-01 PROCEDURE — 25010000002 FUROSEMIDE PER 20 MG: Performed by: INTERNAL MEDICINE

## 2023-01-01 PROCEDURE — 25010000002 FENTANYL CITRATE (PF) 50 MCG/ML SOLUTION: Performed by: ANESTHESIOLOGY

## 2023-01-01 PROCEDURE — 83036 HEMOGLOBIN GLYCOSYLATED A1C: CPT | Performed by: HOSPITALIST

## 2023-01-01 PROCEDURE — 99233 SBSQ HOSP IP/OBS HIGH 50: CPT | Performed by: NURSE PRACTITIONER

## 2023-01-01 PROCEDURE — 93306 TTE W/DOPPLER COMPLETE: CPT | Performed by: INTERNAL MEDICINE

## 2023-01-01 PROCEDURE — 82306 VITAMIN D 25 HYDROXY: CPT | Performed by: INTERNAL MEDICINE

## 2023-01-01 PROCEDURE — 87493 C DIFF AMPLIFIED PROBE: CPT | Performed by: HOSPITALIST

## 2023-01-01 PROCEDURE — C1769 GUIDE WIRE: HCPCS | Performed by: SURGERY

## 2023-01-01 PROCEDURE — 83735 ASSAY OF MAGNESIUM: CPT | Performed by: SURGERY

## 2023-01-01 PROCEDURE — 25010000002 PROPOFOL 10 MG/ML EMULSION: Performed by: NURSE ANESTHETIST, CERTIFIED REGISTERED

## 2023-01-01 PROCEDURE — 25010000002 HYDROMORPHONE PER 4 MG: Performed by: EMERGENCY MEDICINE

## 2023-01-01 PROCEDURE — 78452 HT MUSCLE IMAGE SPECT MULT: CPT | Performed by: INTERNAL MEDICINE

## 2023-01-01 PROCEDURE — 99233 SBSQ HOSP IP/OBS HIGH 50: CPT | Performed by: INTERNAL MEDICINE

## 2023-01-01 PROCEDURE — 83880 ASSAY OF NATRIURETIC PEPTIDE: CPT | Performed by: HOSPITALIST

## 2023-01-01 PROCEDURE — 0 TECHNETIUM SESTAMIBI: Performed by: HOSPITALIST

## 2023-01-01 PROCEDURE — 93306 TTE W/DOPPLER COMPLETE: CPT

## 2023-01-01 PROCEDURE — 25010000002 SODIUM CHLORIDE 0.9 % WITH KCL 20 MEQ 20-0.9 MEQ/L-% SOLUTION: Performed by: INTERNAL MEDICINE

## 2023-01-01 PROCEDURE — 25010000002 SODIUM CHLORIDE 0.9 % WITH KCL 20 MEQ 20-0.9 MEQ/L-% SOLUTION: Performed by: HOSPITALIST

## 2023-01-01 PROCEDURE — 83690 ASSAY OF LIPASE: CPT | Performed by: EMERGENCY MEDICINE

## 2023-01-01 PROCEDURE — 87507 IADNA-DNA/RNA PROBE TQ 12-25: CPT | Performed by: HOSPITALIST

## 2023-01-01 PROCEDURE — 93010 ELECTROCARDIOGRAM REPORT: CPT | Performed by: INTERNAL MEDICINE

## 2023-01-01 PROCEDURE — 25010000002 PIPERACILLIN SOD-TAZOBACTAM PER 1 G: Performed by: EMERGENCY MEDICINE

## 2023-01-01 PROCEDURE — 99232 SBSQ HOSP IP/OBS MODERATE 35: CPT | Performed by: INTERNAL MEDICINE

## 2023-01-01 PROCEDURE — 99221 1ST HOSP IP/OBS SF/LOW 40: CPT | Performed by: NURSE PRACTITIONER

## 2023-01-01 PROCEDURE — 93975 VASCULAR STUDY: CPT

## 2023-01-01 PROCEDURE — C1887 CATHETER, GUIDING: HCPCS | Performed by: SURGERY

## 2023-01-01 PROCEDURE — A9500 TC99M SESTAMIBI: HCPCS | Performed by: HOSPITALIST

## 2023-01-01 PROCEDURE — 80048 BASIC METABOLIC PNL TOTAL CA: CPT | Performed by: HOSPITALIST

## 2023-01-01 PROCEDURE — 93017 CV STRESS TEST TRACING ONLY: CPT

## 2023-01-01 PROCEDURE — 25010000002 HEPARIN (PORCINE) PER 1000 UNITS: Performed by: ANESTHESIOLOGY

## 2023-01-01 PROCEDURE — 75726 ARTERY X-RAYS ABDOMEN: CPT

## 2023-01-01 PROCEDURE — 80053 COMPREHEN METABOLIC PANEL: CPT | Performed by: EMERGENCY MEDICINE

## 2023-01-01 PROCEDURE — 97110 THERAPEUTIC EXERCISES: CPT

## 2023-01-01 PROCEDURE — 25010000002 REGADENOSON 0.4 MG/5ML SOLUTION: Performed by: HOSPITALIST

## 2023-01-01 PROCEDURE — 74177 CT ABD & PELVIS W/CONTRAST: CPT

## 2023-01-01 PROCEDURE — 25010000002 PROTAMINE SULFATE PER 10 MG: Performed by: ANESTHESIOLOGY

## 2023-01-01 PROCEDURE — 97162 PT EVAL MOD COMPLEX 30 MIN: CPT

## 2023-01-01 PROCEDURE — 36415 COLL VENOUS BLD VENIPUNCTURE: CPT

## 2023-01-01 PROCEDURE — 99285 EMERGENCY DEPT VISIT HI MDM: CPT

## 2023-01-01 PROCEDURE — 82533 TOTAL CORTISOL: CPT | Performed by: INTERNAL MEDICINE

## 2023-01-01 PROCEDURE — 87040 BLOOD CULTURE FOR BACTERIA: CPT | Performed by: EMERGENCY MEDICINE

## 2023-01-01 PROCEDURE — 25510000001 IOPAMIDOL 61 % SOLUTION: Performed by: EMERGENCY MEDICINE

## 2023-01-01 PROCEDURE — 25010000002 CEFAZOLIN IN DEXTROSE 2-4 GM/100ML-% SOLUTION: Performed by: SURGERY

## 2023-01-01 PROCEDURE — 82653 EL-1 FECAL QUANTITATIVE: CPT | Performed by: INTERNAL MEDICINE

## 2023-01-01 PROCEDURE — 84443 ASSAY THYROID STIM HORMONE: CPT | Performed by: HOSPITALIST

## 2023-01-01 PROCEDURE — 25010000002 MAGNESIUM SULFATE 2 GM/50ML SOLUTION: Performed by: HOSPITALIST

## 2023-01-01 PROCEDURE — C1894 INTRO/SHEATH, NON-LASER: HCPCS | Performed by: SURGERY

## 2023-01-01 PROCEDURE — 25010000002 PHENYLEPHRINE 10 MG/ML SOLUTION: Performed by: NURSE ANESTHETIST, CERTIFIED REGISTERED

## 2023-01-01 PROCEDURE — 99497 ADVNCD CARE PLAN 30 MIN: CPT | Performed by: NURSE PRACTITIONER

## 2023-01-01 PROCEDURE — 25010000002 ALBUMIN HUMAN 5% PER 50 ML: Performed by: ANESTHESIOLOGY

## 2023-01-01 PROCEDURE — 84100 ASSAY OF PHOSPHORUS: CPT | Performed by: HOSPITALIST

## 2023-01-01 RX ORDER — CALCIUM CARBONATE 500 MG/1
1 TABLET, CHEWABLE ORAL 3 TIMES DAILY PRN
Status: DISCONTINUED | OUTPATIENT
Start: 2023-01-01 | End: 2023-01-01 | Stop reason: HOSPADM

## 2023-01-01 RX ORDER — DROPERIDOL 2.5 MG/ML
0.62 INJECTION, SOLUTION INTRAMUSCULAR; INTRAVENOUS
Status: DISCONTINUED | OUTPATIENT
Start: 2023-01-01 | End: 2023-01-01 | Stop reason: HOSPADM

## 2023-01-01 RX ORDER — HYDROCODONE BITARTRATE AND ACETAMINOPHEN 7.5; 325 MG/1; MG/1
1 TABLET ORAL EVERY 4 HOURS PRN
Status: DISCONTINUED | OUTPATIENT
Start: 2023-01-01 | End: 2023-01-01 | Stop reason: HOSPADM

## 2023-01-01 RX ORDER — FAMOTIDINE 10 MG/ML
20 INJECTION, SOLUTION INTRAVENOUS EVERY 12 HOURS SCHEDULED
Status: DISCONTINUED | OUTPATIENT
Start: 2023-01-01 | End: 2023-01-01

## 2023-01-01 RX ORDER — PROTAMINE SULFATE 10 MG/ML
INJECTION, SOLUTION INTRAVENOUS AS NEEDED
Status: DISCONTINUED | OUTPATIENT
Start: 2023-01-01 | End: 2023-01-01 | Stop reason: SURG

## 2023-01-01 RX ORDER — ROCURONIUM BROMIDE 10 MG/ML
INJECTION, SOLUTION INTRAVENOUS AS NEEDED
Status: DISCONTINUED | OUTPATIENT
Start: 2023-01-01 | End: 2023-01-01 | Stop reason: SURG

## 2023-01-01 RX ORDER — FENTANYL CITRATE 50 UG/ML
25 INJECTION, SOLUTION INTRAMUSCULAR; INTRAVENOUS
Status: DISCONTINUED | OUTPATIENT
Start: 2023-01-01 | End: 2023-01-01 | Stop reason: HOSPADM

## 2023-01-01 RX ORDER — NALOXONE HCL 0.4 MG/ML
0.2 VIAL (ML) INJECTION AS NEEDED
Status: DISCONTINUED | OUTPATIENT
Start: 2023-01-01 | End: 2023-01-01 | Stop reason: HOSPADM

## 2023-01-01 RX ORDER — ONDANSETRON 2 MG/ML
4 INJECTION INTRAMUSCULAR; INTRAVENOUS ONCE
Status: COMPLETED | OUTPATIENT
Start: 2023-01-01 | End: 2023-01-01

## 2023-01-01 RX ORDER — SODIUM CHLORIDE 0.9 % (FLUSH) 0.9 %
3 SYRINGE (ML) INJECTION EVERY 12 HOURS SCHEDULED
Status: DISCONTINUED | OUTPATIENT
Start: 2023-01-01 | End: 2023-01-01 | Stop reason: HOSPADM

## 2023-01-01 RX ORDER — LABETALOL HYDROCHLORIDE 5 MG/ML
5 INJECTION, SOLUTION INTRAVENOUS
Status: DISCONTINUED | OUTPATIENT
Start: 2023-01-01 | End: 2023-01-01 | Stop reason: HOSPADM

## 2023-01-01 RX ORDER — SODIUM CHLORIDE AND POTASSIUM CHLORIDE 150; 900 MG/100ML; MG/100ML
125 INJECTION, SOLUTION INTRAVENOUS CONTINUOUS
Status: DISCONTINUED | OUTPATIENT
Start: 2023-01-01 | End: 2023-01-01

## 2023-01-01 RX ORDER — ONDANSETRON 2 MG/ML
4 INJECTION INTRAMUSCULAR; INTRAVENOUS ONCE AS NEEDED
Status: DISCONTINUED | OUTPATIENT
Start: 2023-01-01 | End: 2023-01-01 | Stop reason: HOSPADM

## 2023-01-01 RX ORDER — PROMETHAZINE HYDROCHLORIDE 25 MG/1
25 TABLET ORAL ONCE AS NEEDED
Status: DISCONTINUED | OUTPATIENT
Start: 2023-01-01 | End: 2023-01-01 | Stop reason: HOSPADM

## 2023-01-01 RX ORDER — ALBUMIN (HUMAN) 12.5 G/50ML
12.5 SOLUTION INTRAVENOUS ONCE
Status: DISCONTINUED | OUTPATIENT
Start: 2023-01-01 | End: 2023-01-01

## 2023-01-01 RX ORDER — DIPHENHYDRAMINE HYDROCHLORIDE 50 MG/ML
12.5 INJECTION INTRAMUSCULAR; INTRAVENOUS
Status: DISCONTINUED | OUTPATIENT
Start: 2023-01-01 | End: 2023-01-01 | Stop reason: HOSPADM

## 2023-01-01 RX ORDER — DEXAMETHASONE SODIUM PHOSPHATE 4 MG/ML
INJECTION, SOLUTION INTRA-ARTICULAR; INTRALESIONAL; INTRAMUSCULAR; INTRAVENOUS; SOFT TISSUE AS NEEDED
Status: DISCONTINUED | OUTPATIENT
Start: 2023-01-01 | End: 2023-01-01 | Stop reason: SURG

## 2023-01-01 RX ORDER — CILOSTAZOL 100 MG/1
50 TABLET ORAL
Status: DISCONTINUED | OUTPATIENT
Start: 2023-01-01 | End: 2023-01-01 | Stop reason: HOSPADM

## 2023-01-01 RX ORDER — SUCRALFATE 1 G/1
1 TABLET ORAL
Status: DISCONTINUED | OUTPATIENT
Start: 2023-01-01 | End: 2023-01-01 | Stop reason: HOSPADM

## 2023-01-01 RX ORDER — HYDROMORPHONE HYDROCHLORIDE 1 MG/ML
0.5 INJECTION, SOLUTION INTRAMUSCULAR; INTRAVENOUS; SUBCUTANEOUS ONCE
Status: COMPLETED | OUTPATIENT
Start: 2023-01-01 | End: 2023-01-01

## 2023-01-01 RX ORDER — VASOPRESSIN 20 U/ML
INJECTION PARENTERAL AS NEEDED
Status: DISCONTINUED | OUTPATIENT
Start: 2023-01-01 | End: 2023-01-01 | Stop reason: SURG

## 2023-01-01 RX ORDER — CEFAZOLIN SODIUM 2 G/100ML
2 INJECTION, SOLUTION INTRAVENOUS ONCE
Status: COMPLETED | OUTPATIENT
Start: 2023-01-01 | End: 2023-01-01

## 2023-01-01 RX ORDER — FENTANYL CITRATE 50 UG/ML
INJECTION, SOLUTION INTRAMUSCULAR; INTRAVENOUS AS NEEDED
Status: DISCONTINUED | OUTPATIENT
Start: 2023-01-01 | End: 2023-01-01 | Stop reason: SURG

## 2023-01-01 RX ORDER — REGADENOSON 0.08 MG/ML
0.4 INJECTION, SOLUTION INTRAVENOUS
Status: COMPLETED | OUTPATIENT
Start: 2023-01-01 | End: 2023-01-01

## 2023-01-01 RX ORDER — SODIUM CHLORIDE 450 MG/100ML
100 INJECTION, SOLUTION INTRAVENOUS CONTINUOUS
Status: ACTIVE | OUTPATIENT
Start: 2023-01-01 | End: 2023-01-01

## 2023-01-01 RX ORDER — AZITHROMYCIN 250 MG/1
500 TABLET, FILM COATED ORAL
Status: COMPLETED | OUTPATIENT
Start: 2023-01-01 | End: 2023-01-01

## 2023-01-01 RX ORDER — SODIUM CHLORIDE, SODIUM LACTATE, POTASSIUM CHLORIDE, CALCIUM CHLORIDE 600; 310; 30; 20 MG/100ML; MG/100ML; MG/100ML; MG/100ML
9 INJECTION, SOLUTION INTRAVENOUS CONTINUOUS
Status: DISCONTINUED | OUTPATIENT
Start: 2023-01-01 | End: 2023-01-01 | Stop reason: HOSPADM

## 2023-01-01 RX ORDER — HYDROMORPHONE HYDROCHLORIDE 1 MG/ML
0.5 INJECTION, SOLUTION INTRAMUSCULAR; INTRAVENOUS; SUBCUTANEOUS EVERY 4 HOURS PRN
Status: DISCONTINUED | OUTPATIENT
Start: 2023-01-01 | End: 2023-01-01 | Stop reason: HOSPADM

## 2023-01-01 RX ORDER — HYDRALAZINE HYDROCHLORIDE 20 MG/ML
5 INJECTION INTRAMUSCULAR; INTRAVENOUS
Status: DISCONTINUED | OUTPATIENT
Start: 2023-01-01 | End: 2023-01-01 | Stop reason: HOSPADM

## 2023-01-01 RX ORDER — NITROGLYCERIN 0.4 MG/1
0.4 TABLET SUBLINGUAL
Status: DISCONTINUED | OUTPATIENT
Start: 2023-01-01 | End: 2023-01-01 | Stop reason: HOSPADM

## 2023-01-01 RX ORDER — ASPIRIN 81 MG/1
81 TABLET, CHEWABLE ORAL DAILY
Status: DISCONTINUED | OUTPATIENT
Start: 2023-01-01 | End: 2023-01-01 | Stop reason: HOSPADM

## 2023-01-01 RX ORDER — PROMETHAZINE HYDROCHLORIDE 25 MG/1
25 SUPPOSITORY RECTAL ONCE AS NEEDED
Status: DISCONTINUED | OUTPATIENT
Start: 2023-01-01 | End: 2023-01-01 | Stop reason: HOSPADM

## 2023-01-01 RX ORDER — HYDROMORPHONE HYDROCHLORIDE 1 MG/ML
0.25 INJECTION, SOLUTION INTRAMUSCULAR; INTRAVENOUS; SUBCUTANEOUS
Status: DISCONTINUED | OUTPATIENT
Start: 2023-01-01 | End: 2023-01-01 | Stop reason: HOSPADM

## 2023-01-01 RX ORDER — ONDANSETRON 2 MG/ML
INJECTION INTRAMUSCULAR; INTRAVENOUS AS NEEDED
Status: DISCONTINUED | OUTPATIENT
Start: 2023-01-01 | End: 2023-01-01 | Stop reason: SURG

## 2023-01-01 RX ORDER — EPHEDRINE SULFATE 50 MG/ML
5 INJECTION, SOLUTION INTRAVENOUS ONCE AS NEEDED
Status: DISCONTINUED | OUTPATIENT
Start: 2023-01-01 | End: 2023-01-01 | Stop reason: HOSPADM

## 2023-01-01 RX ORDER — METOPROLOL SUCCINATE 25 MG/1
12.5 TABLET, EXTENDED RELEASE ORAL
Status: DISCONTINUED | OUTPATIENT
Start: 2023-01-01 | End: 2023-01-01 | Stop reason: HOSPADM

## 2023-01-01 RX ORDER — ALBUMIN, HUMAN INJ 5% 5 %
500 SOLUTION INTRAVENOUS ONCE
Status: COMPLETED | OUTPATIENT
Start: 2023-01-01 | End: 2023-01-01

## 2023-01-01 RX ORDER — POTASSIUM CHLORIDE 750 MG/1
40 TABLET, FILM COATED, EXTENDED RELEASE ORAL ONCE
Status: COMPLETED | OUTPATIENT
Start: 2023-01-01 | End: 2023-01-01

## 2023-01-01 RX ORDER — NALOXONE HCL 0.4 MG/ML
0.4 VIAL (ML) INJECTION
Status: DISCONTINUED | OUTPATIENT
Start: 2023-01-01 | End: 2023-01-01 | Stop reason: HOSPADM

## 2023-01-01 RX ORDER — SODIUM CHLORIDE 450 MG/100ML
100 INJECTION, SOLUTION INTRAVENOUS CONTINUOUS
Status: DISCONTINUED | OUTPATIENT
Start: 2023-01-01 | End: 2023-01-01

## 2023-01-01 RX ORDER — ERGOCALCIFEROL 1.25 MG/1
50000 CAPSULE ORAL
Status: DISCONTINUED | OUTPATIENT
Start: 2023-01-01 | End: 2023-01-01 | Stop reason: HOSPADM

## 2023-01-01 RX ORDER — SODIUM CHLORIDE 0.9 % (FLUSH) 0.9 %
3-10 SYRINGE (ML) INJECTION AS NEEDED
Status: DISCONTINUED | OUTPATIENT
Start: 2023-01-01 | End: 2023-01-01 | Stop reason: HOSPADM

## 2023-01-01 RX ORDER — LOPERAMIDE HYDROCHLORIDE 2 MG/1
2 CAPSULE ORAL 3 TIMES DAILY PRN
Status: DISCONTINUED | OUTPATIENT
Start: 2023-01-01 | End: 2023-01-01 | Stop reason: HOSPADM

## 2023-01-01 RX ORDER — CALCIUM GLUCONATE 20 MG/ML
2000 INJECTION, SOLUTION INTRAVENOUS
Status: COMPLETED | OUTPATIENT
Start: 2023-01-01 | End: 2023-01-01

## 2023-01-01 RX ORDER — MAGNESIUM SULFATE HEPTAHYDRATE 40 MG/ML
2 INJECTION, SOLUTION INTRAVENOUS ONCE
Status: COMPLETED | OUTPATIENT
Start: 2023-01-01 | End: 2023-01-01

## 2023-01-01 RX ORDER — HEPARIN SODIUM 5000 [USP'U]/ML
5000 INJECTION, SOLUTION INTRAVENOUS; SUBCUTANEOUS EVERY 12 HOURS SCHEDULED
Status: DISCONTINUED | OUTPATIENT
Start: 2023-01-01 | End: 2023-01-01 | Stop reason: HOSPADM

## 2023-01-01 RX ORDER — MAGNESIUM SULFATE HEPTAHYDRATE 40 MG/ML
2 INJECTION, SOLUTION INTRAVENOUS
Status: DISCONTINUED | OUTPATIENT
Start: 2023-01-01 | End: 2023-01-01

## 2023-01-01 RX ORDER — PHENYLEPHRINE HYDROCHLORIDE 10 MG/ML
INJECTION INTRAVENOUS AS NEEDED
Status: DISCONTINUED | OUTPATIENT
Start: 2023-01-01 | End: 2023-01-01 | Stop reason: SURG

## 2023-01-01 RX ORDER — IPRATROPIUM BROMIDE AND ALBUTEROL SULFATE 2.5; .5 MG/3ML; MG/3ML
3 SOLUTION RESPIRATORY (INHALATION) ONCE AS NEEDED
Status: DISCONTINUED | OUTPATIENT
Start: 2023-01-01 | End: 2023-01-01 | Stop reason: HOSPADM

## 2023-01-01 RX ORDER — HYDROCODONE BITARTRATE AND ACETAMINOPHEN 5; 325 MG/1; MG/1
1 TABLET ORAL ONCE AS NEEDED
Status: DISCONTINUED | OUTPATIENT
Start: 2023-01-01 | End: 2023-01-01 | Stop reason: HOSPADM

## 2023-01-01 RX ORDER — FLUMAZENIL 0.1 MG/ML
0.2 INJECTION INTRAVENOUS AS NEEDED
Status: DISCONTINUED | OUTPATIENT
Start: 2023-01-01 | End: 2023-01-01 | Stop reason: HOSPADM

## 2023-01-01 RX ORDER — FAMOTIDINE 20 MG/1
40 TABLET, FILM COATED ORAL
Status: DISCONTINUED | OUTPATIENT
Start: 2023-01-01 | End: 2023-01-01 | Stop reason: HOSPADM

## 2023-01-01 RX ORDER — CALCIUM POLYCARBOPHIL 625 MG
625 TABLET ORAL 2 TIMES DAILY
Status: DISCONTINUED | OUTPATIENT
Start: 2023-01-01 | End: 2023-01-01 | Stop reason: HOSPADM

## 2023-01-01 RX ORDER — FENTANYL/ROPIVACAINE/NS/PF 2-625MCG/1
15 PLASTIC BAG, INJECTION (ML) EPIDURAL
Status: COMPLETED | OUTPATIENT
Start: 2023-01-01 | End: 2023-01-01

## 2023-01-01 RX ORDER — HYDRALAZINE HYDROCHLORIDE 20 MG/ML
10 INJECTION INTRAMUSCULAR; INTRAVENOUS EVERY 4 HOURS PRN
Status: DISCONTINUED | OUTPATIENT
Start: 2023-01-01 | End: 2023-01-01

## 2023-01-01 RX ORDER — CALCIUM GLUCONATE 20 MG/ML
1000 INJECTION, SOLUTION INTRAVENOUS
Status: COMPLETED | OUTPATIENT
Start: 2023-01-01 | End: 2023-01-01

## 2023-01-01 RX ORDER — ONDANSETRON 2 MG/ML
4 INJECTION INTRAMUSCULAR; INTRAVENOUS EVERY 6 HOURS PRN
Status: DISCONTINUED | OUTPATIENT
Start: 2023-01-01 | End: 2023-01-01 | Stop reason: HOSPADM

## 2023-01-01 RX ORDER — POTASSIUM CHLORIDE 7.45 MG/ML
10 INJECTION INTRAVENOUS
Status: COMPLETED | OUTPATIENT
Start: 2023-01-01 | End: 2023-01-01

## 2023-01-01 RX ORDER — FUROSEMIDE 10 MG/ML
20 INJECTION INTRAMUSCULAR; INTRAVENOUS ONCE
Status: COMPLETED | OUTPATIENT
Start: 2023-01-01 | End: 2023-01-01

## 2023-01-01 RX ORDER — HEPARIN SODIUM 1000 [USP'U]/ML
INJECTION, SOLUTION INTRAVENOUS; SUBCUTANEOUS AS NEEDED
Status: DISCONTINUED | OUTPATIENT
Start: 2023-01-01 | End: 2023-01-01 | Stop reason: SURG

## 2023-01-01 RX ORDER — POTASSIUM CHLORIDE 1.5 G/1.58G
40 POWDER, FOR SOLUTION ORAL EVERY 4 HOURS
Status: COMPLETED | OUTPATIENT
Start: 2023-01-01 | End: 2023-01-01

## 2023-01-01 RX ORDER — CALCIUM CARBONATE 500 MG/1
2 TABLET, CHEWABLE ORAL 2 TIMES DAILY
Status: DISCONTINUED | OUTPATIENT
Start: 2023-01-01 | End: 2023-01-01

## 2023-01-01 RX ORDER — LIDOCAINE HYDROCHLORIDE 20 MG/ML
INJECTION, SOLUTION INFILTRATION; PERINEURAL AS NEEDED
Status: DISCONTINUED | OUTPATIENT
Start: 2023-01-01 | End: 2023-01-01 | Stop reason: SURG

## 2023-01-01 RX ORDER — AMLODIPINE BESYLATE 10 MG/1
10 TABLET ORAL DAILY
Status: DISCONTINUED | OUTPATIENT
Start: 2023-01-01 | End: 2023-01-01

## 2023-01-01 RX ORDER — HYDROMORPHONE HYDROCHLORIDE 1 MG/ML
0.5 INJECTION, SOLUTION INTRAMUSCULAR; INTRAVENOUS; SUBCUTANEOUS
Status: DISCONTINUED | OUTPATIENT
Start: 2023-01-01 | End: 2023-01-01 | Stop reason: HOSPADM

## 2023-01-01 RX ORDER — DIOSMIN COMPLEX NO.1 630 MG
TABLET ORAL
COMMUNITY

## 2023-01-01 RX ORDER — SODIUM CHLORIDE 0.9 % (FLUSH) 0.9 %
10 SYRINGE (ML) INJECTION AS NEEDED
Status: DISCONTINUED | OUTPATIENT
Start: 2023-01-01 | End: 2023-01-01 | Stop reason: HOSPADM

## 2023-01-01 RX ORDER — POTASSIUM CHLORIDE 750 MG/1
40 TABLET, FILM COATED, EXTENDED RELEASE ORAL EVERY 4 HOURS
Status: DISCONTINUED | OUTPATIENT
Start: 2023-01-01 | End: 2023-01-01

## 2023-01-01 RX ADMIN — MAGNESIUM SULFATE HEPTAHYDRATE 2 G: 2 INJECTION, SOLUTION INTRAVENOUS at 13:08

## 2023-01-01 RX ADMIN — SUCRALFATE 1 G: 1 TABLET ORAL at 21:19

## 2023-01-01 RX ADMIN — ONDANSETRON 4 MG: 2 INJECTION INTRAMUSCULAR; INTRAVENOUS at 04:39

## 2023-01-01 RX ADMIN — POTASSIUM CHLORIDE 10 MEQ: 7.46 INJECTION, SOLUTION INTRAVENOUS at 08:37

## 2023-01-01 RX ADMIN — CALCIUM GLUCONATE 2000 MG: 20 INJECTION, SOLUTION INTRAVENOUS at 18:21

## 2023-01-01 RX ADMIN — ASPIRIN 81 MG: 81 TABLET, CHEWABLE ORAL at 08:09

## 2023-01-01 RX ADMIN — SUCRALFATE 1 G: 1 TABLET ORAL at 17:44

## 2023-01-01 RX ADMIN — FUROSEMIDE 20 MG: 20 INJECTION, SOLUTION INTRAMUSCULAR; INTRAVENOUS at 15:58

## 2023-01-01 RX ADMIN — SODIUM BICARBONATE 150 MEQ: 84 INJECTION, SOLUTION INTRAVENOUS at 01:20

## 2023-01-01 RX ADMIN — HYDROMORPHONE HYDROCHLORIDE 0.5 MG: 1 INJECTION, SOLUTION INTRAMUSCULAR; INTRAVENOUS; SUBCUTANEOUS at 12:15

## 2023-01-01 RX ADMIN — Medication 10 ML: at 12:16

## 2023-01-01 RX ADMIN — IOPAMIDOL 85 ML: 612 INJECTION, SOLUTION INTRAVENOUS at 21:16

## 2023-01-01 RX ADMIN — PIPERACILLIN SODIUM AND TAZOBACTAM SODIUM 3.38 G: 3; .375 INJECTION, SOLUTION INTRAVENOUS at 22:02

## 2023-01-01 RX ADMIN — ASPIRIN 81 MG: 81 TABLET, CHEWABLE ORAL at 08:58

## 2023-01-01 RX ADMIN — FAMOTIDINE 20 MG: 10 INJECTION INTRAVENOUS at 09:24

## 2023-01-01 RX ADMIN — POTASSIUM CHLORIDE 10 MEQ: 7.46 INJECTION, SOLUTION INTRAVENOUS at 09:56

## 2023-01-01 RX ADMIN — FAMOTIDINE 40 MG: 20 TABLET, FILM COATED ORAL at 06:58

## 2023-01-01 RX ADMIN — SUCRALFATE 1 G: 1 TABLET ORAL at 20:45

## 2023-01-01 RX ADMIN — HYDROMORPHONE HYDROCHLORIDE 0.5 MG: 1 INJECTION, SOLUTION INTRAMUSCULAR; INTRAVENOUS; SUBCUTANEOUS at 19:44

## 2023-01-01 RX ADMIN — SUCRALFATE 1 G: 1 TABLET ORAL at 13:22

## 2023-01-01 RX ADMIN — POTASSIUM PHOSPHATE, MONOBASIC POTASSIUM PHOSPHATE, DIBASIC 15 MMOL: 224; 236 INJECTION, SOLUTION, CONCENTRATE INTRAVENOUS at 21:19

## 2023-01-01 RX ADMIN — PIPERACILLIN SODIUM AND TAZOBACTAM SODIUM 3.38 G: 3; .375 INJECTION, SOLUTION INTRAVENOUS at 14:42

## 2023-01-01 RX ADMIN — ASPIRIN 81 MG: 81 TABLET, CHEWABLE ORAL at 08:51

## 2023-01-01 RX ADMIN — PHENYLEPHRINE HYDROCHLORIDE 1 MCG/KG/MIN: 10 INJECTION, SOLUTION INTRAVENOUS at 15:42

## 2023-01-01 RX ADMIN — ROCURONIUM BROMIDE 40 MG: 10 INJECTION, SOLUTION INTRAVENOUS at 15:29

## 2023-01-01 RX ADMIN — POTASSIUM CHLORIDE 40 MEQ: 1.5 POWDER, FOR SOLUTION ORAL at 05:18

## 2023-01-01 RX ADMIN — MAGNESIUM SULFATE HEPTAHYDRATE 2 G: 2 INJECTION, SOLUTION INTRAVENOUS at 10:03

## 2023-01-01 RX ADMIN — SUCRALFATE 1 G: 1 TABLET ORAL at 12:43

## 2023-01-01 RX ADMIN — FAMOTIDINE 40 MG: 20 TABLET, FILM COATED ORAL at 05:53

## 2023-01-01 RX ADMIN — HYDROMORPHONE HYDROCHLORIDE 0.5 MG: 1 INJECTION, SOLUTION INTRAMUSCULAR; INTRAVENOUS; SUBCUTANEOUS at 19:21

## 2023-01-01 RX ADMIN — IOPAMIDOL 120 ML: 510 INJECTION, SOLUTION INTRAVASCULAR at 16:14

## 2023-01-01 RX ADMIN — HYDROMORPHONE HYDROCHLORIDE 0.5 MG: 1 INJECTION, SOLUTION INTRAMUSCULAR; INTRAVENOUS; SUBCUTANEOUS at 22:19

## 2023-01-01 RX ADMIN — SUCRALFATE 1 G: 1 TABLET ORAL at 08:09

## 2023-01-01 RX ADMIN — HYDROMORPHONE HYDROCHLORIDE 0.25 MG: 1 INJECTION, SOLUTION INTRAMUSCULAR; INTRAVENOUS; SUBCUTANEOUS at 22:11

## 2023-01-01 RX ADMIN — SUCRALFATE 1 G: 1 TABLET ORAL at 23:40

## 2023-01-01 RX ADMIN — ASPIRIN 81 MG: 81 TABLET, CHEWABLE ORAL at 16:01

## 2023-01-01 RX ADMIN — CILOSTAZOL 50 MG: 100 TABLET ORAL at 05:54

## 2023-01-01 RX ADMIN — HYDROMORPHONE HYDROCHLORIDE 0.25 MG: 1 INJECTION, SOLUTION INTRAMUSCULAR; INTRAVENOUS; SUBCUTANEOUS at 17:28

## 2023-01-01 RX ADMIN — FAMOTIDINE 40 MG: 20 TABLET, FILM COATED ORAL at 08:58

## 2023-01-01 RX ADMIN — HYDROMORPHONE HYDROCHLORIDE 0.5 MG: 1 INJECTION, SOLUTION INTRAMUSCULAR; INTRAVENOUS; SUBCUTANEOUS at 06:14

## 2023-01-01 RX ADMIN — POTASSIUM CHLORIDE AND SODIUM CHLORIDE 125 ML/HR: 900; 150 INJECTION, SOLUTION INTRAVENOUS at 21:19

## 2023-01-01 RX ADMIN — SUGAMMADEX 200 MG: 100 INJECTION, SOLUTION INTRAVENOUS at 16:41

## 2023-01-01 RX ADMIN — POTASSIUM CHLORIDE AND SODIUM CHLORIDE 125 ML/HR: 900; 150 INJECTION, SOLUTION INTRAVENOUS at 10:38

## 2023-01-01 RX ADMIN — HYDROMORPHONE HYDROCHLORIDE 0.5 MG: 1 INJECTION, SOLUTION INTRAMUSCULAR; INTRAVENOUS; SUBCUTANEOUS at 20:52

## 2023-01-01 RX ADMIN — CILOSTAZOL 50 MG: 100 TABLET ORAL at 17:44

## 2023-01-01 RX ADMIN — POTASSIUM CHLORIDE 10 MEQ: 7.46 INJECTION, SOLUTION INTRAVENOUS at 12:23

## 2023-01-01 RX ADMIN — CILOSTAZOL 50 MG: 100 TABLET ORAL at 06:58

## 2023-01-01 RX ADMIN — FENTANYL CITRATE 25 MCG: 50 INJECTION, SOLUTION INTRAMUSCULAR; INTRAVENOUS at 19:16

## 2023-01-01 RX ADMIN — PIPERACILLIN SODIUM AND TAZOBACTAM SODIUM 3.38 G: 3; .375 INJECTION, SOLUTION INTRAVENOUS at 06:00

## 2023-01-01 RX ADMIN — HYDROMORPHONE HYDROCHLORIDE 0.5 MG: 1 INJECTION, SOLUTION INTRAMUSCULAR; INTRAVENOUS; SUBCUTANEOUS at 12:43

## 2023-01-01 RX ADMIN — ALBUMIN (HUMAN) 500 ML: 12.5 INJECTION, SOLUTION INTRAVENOUS at 21:45

## 2023-01-01 RX ADMIN — TECHNETIUM TC 99M SESTAMIBI 1 DOSE: 1 INJECTION INTRAVENOUS at 10:21

## 2023-01-01 RX ADMIN — PIPERACILLIN SODIUM AND TAZOBACTAM SODIUM 3.38 G: 3; .375 INJECTION, SOLUTION INTRAVENOUS at 05:59

## 2023-01-01 RX ADMIN — CALCIUM GLUCONATE 2000 MG: 20 INJECTION, SOLUTION INTRAVENOUS at 15:59

## 2023-01-01 RX ADMIN — POTASSIUM PHOSPHATE, MONOBASIC POTASSIUM PHOSPHATE, DIBASIC 15 MMOL: 224; 236 INJECTION, SOLUTION, CONCENTRATE INTRAVENOUS at 00:26

## 2023-01-01 RX ADMIN — PIPERACILLIN SODIUM AND TAZOBACTAM SODIUM 3.38 G: 3; .375 INJECTION, SOLUTION INTRAVENOUS at 05:33

## 2023-01-01 RX ADMIN — CALCIUM GLUCONATE 2000 MG: 20 INJECTION, SOLUTION INTRAVENOUS at 10:27

## 2023-01-01 RX ADMIN — POTASSIUM CHLORIDE 40 MEQ: 750 TABLET, EXTENDED RELEASE ORAL at 21:19

## 2023-01-01 RX ADMIN — POTASSIUM CHLORIDE AND SODIUM CHLORIDE 125 ML/HR: 900; 150 INJECTION, SOLUTION INTRAVENOUS at 09:24

## 2023-01-01 RX ADMIN — HYDROCODONE BITARTRATE AND ACETAMINOPHEN 1 TABLET: 7.5; 325 TABLET ORAL at 07:01

## 2023-01-01 RX ADMIN — PHENYLEPHRINE HYDROCHLORIDE 200 MCG: 10 INJECTION INTRAVENOUS at 15:31

## 2023-01-01 RX ADMIN — PIPERACILLIN SODIUM AND TAZOBACTAM SODIUM 3.38 G: 3; .375 INJECTION, SOLUTION INTRAVENOUS at 21:19

## 2023-01-01 RX ADMIN — SODIUM CHLORIDE 1000 ML: 9 INJECTION, SOLUTION INTRAVENOUS at 20:53

## 2023-01-01 RX ADMIN — FAMOTIDINE 40 MG: 20 TABLET, FILM COATED ORAL at 17:45

## 2023-01-01 RX ADMIN — HYDROMORPHONE HYDROCHLORIDE 0.25 MG: 1 INJECTION, SOLUTION INTRAMUSCULAR; INTRAVENOUS; SUBCUTANEOUS at 18:47

## 2023-01-01 RX ADMIN — AMLODIPINE BESYLATE 10 MG: 10 TABLET ORAL at 14:48

## 2023-01-01 RX ADMIN — ASPIRIN 81 MG: 81 TABLET, CHEWABLE ORAL at 08:38

## 2023-01-01 RX ADMIN — POTASSIUM CHLORIDE 40 MEQ: 750 TABLET, EXTENDED RELEASE ORAL at 08:37

## 2023-01-01 RX ADMIN — Medication 10 ML: at 08:53

## 2023-01-01 RX ADMIN — SUCRALFATE 1 G: 1 TABLET ORAL at 17:47

## 2023-01-01 RX ADMIN — Medication 10 ML: at 08:38

## 2023-01-01 RX ADMIN — ONDANSETRON 4 MG: 2 INJECTION INTRAMUSCULAR; INTRAVENOUS at 06:50

## 2023-01-01 RX ADMIN — SUCRALFATE 1 G: 1 TABLET ORAL at 08:58

## 2023-01-01 RX ADMIN — AZITHROMYCIN DIHYDRATE 500 MG: 250 TABLET, FILM COATED ORAL at 08:58

## 2023-01-01 RX ADMIN — CALCIUM POLYCARBOPHIL 625 MG: 625 TABLET, FILM COATED ORAL at 08:51

## 2023-01-01 RX ADMIN — CALCIUM POLYCARBOPHIL 625 MG: 625 TABLET, FILM COATED ORAL at 08:08

## 2023-01-01 RX ADMIN — METOPROLOL SUCCINATE 12.5 MG: 25 TABLET, EXTENDED RELEASE ORAL at 08:51

## 2023-01-01 RX ADMIN — LOPERAMIDE HYDROCHLORIDE 2 MG: 2 CAPSULE ORAL at 09:06

## 2023-01-01 RX ADMIN — PIPERACILLIN SODIUM AND TAZOBACTAM SODIUM 3.38 G: 3; .375 INJECTION, SOLUTION INTRAVENOUS at 21:14

## 2023-01-01 RX ADMIN — AZITHROMYCIN DIHYDRATE 500 MG: 250 TABLET, FILM COATED ORAL at 08:09

## 2023-01-01 RX ADMIN — FAMOTIDINE 20 MG: 10 INJECTION INTRAVENOUS at 21:15

## 2023-01-01 RX ADMIN — FAMOTIDINE 20 MG: 10 INJECTION INTRAVENOUS at 08:37

## 2023-01-01 RX ADMIN — CALCIUM GLUCONATE 1000 MG: 20 INJECTION, SOLUTION INTRAVENOUS at 09:04

## 2023-01-01 RX ADMIN — TECHNETIUM TC 99M SESTAMIBI 1 DOSE: 1 INJECTION INTRAVENOUS at 12:33

## 2023-01-01 RX ADMIN — CALCIUM POLYCARBOPHIL 625 MG: 625 TABLET, FILM COATED ORAL at 08:58

## 2023-01-01 RX ADMIN — POTASSIUM CHLORIDE AND SODIUM CHLORIDE 125 ML/HR: 900; 150 INJECTION, SOLUTION INTRAVENOUS at 00:18

## 2023-01-01 RX ADMIN — Medication 10 ML: at 15:33

## 2023-01-01 RX ADMIN — LOPERAMIDE HYDROCHLORIDE 2 MG: 2 CAPSULE ORAL at 18:15

## 2023-01-01 RX ADMIN — HYDROMORPHONE HYDROCHLORIDE 0.5 MG: 1 INJECTION, SOLUTION INTRAMUSCULAR; INTRAVENOUS; SUBCUTANEOUS at 09:51

## 2023-01-01 RX ADMIN — AZITHROMYCIN DIHYDRATE 500 MG: 250 TABLET, FILM COATED ORAL at 08:51

## 2023-01-01 RX ADMIN — HYDROMORPHONE HYDROCHLORIDE 0.25 MG: 1 INJECTION, SOLUTION INTRAMUSCULAR; INTRAVENOUS; SUBCUTANEOUS at 18:56

## 2023-01-01 RX ADMIN — REGADENOSON 0.4 MG: 0.08 INJECTION, SOLUTION INTRAVENOUS at 12:33

## 2023-01-01 RX ADMIN — HEPARIN SODIUM 5000 UNITS: 5000 INJECTION INTRAVENOUS; SUBCUTANEOUS at 20:33

## 2023-01-01 RX ADMIN — SUCRALFATE 1 G: 1 TABLET ORAL at 13:08

## 2023-01-01 RX ADMIN — SODIUM CHLORIDE, POTASSIUM CHLORIDE, SODIUM LACTATE AND CALCIUM CHLORIDE 1000 ML: 600; 310; 30; 20 INJECTION, SOLUTION INTRAVENOUS at 21:59

## 2023-01-01 RX ADMIN — MAGNESIUM SULFATE HEPTAHYDRATE 2 G: 2 INJECTION, SOLUTION INTRAVENOUS at 08:10

## 2023-01-01 RX ADMIN — SUCRALFATE 1 G: 1 TABLET ORAL at 18:14

## 2023-01-01 RX ADMIN — SUCRALFATE 1 G: 1 TABLET ORAL at 08:51

## 2023-01-01 RX ADMIN — FENTANYL CITRATE 25 MCG: 50 INJECTION, SOLUTION INTRAMUSCULAR; INTRAVENOUS at 19:24

## 2023-01-01 RX ADMIN — PHENYLEPHRINE HYDROCHLORIDE 200 MCG: 10 INJECTION INTRAVENOUS at 15:36

## 2023-01-01 RX ADMIN — PIPERACILLIN SODIUM AND TAZOBACTAM SODIUM 3.38 G: 3; .375 INJECTION, SOLUTION INTRAVENOUS at 14:48

## 2023-01-01 RX ADMIN — DEXAMETHASONE SODIUM PHOSPHATE 6 MG: 4 INJECTION, SOLUTION INTRA-ARTICULAR; INTRALESIONAL; INTRAMUSCULAR; INTRAVENOUS; SOFT TISSUE at 16:06

## 2023-01-01 RX ADMIN — PROTAMINE SULFATE 30 MG: 10 INJECTION, SOLUTION INTRAVENOUS at 16:41

## 2023-01-01 RX ADMIN — LIDOCAINE HYDROCHLORIDE 40 MG: 20 INJECTION, SOLUTION INFILTRATION; PERINEURAL at 15:29

## 2023-01-01 RX ADMIN — SODIUM BICARBONATE 150 MEQ: 84 INJECTION, SOLUTION INTRAVENOUS at 14:46

## 2023-01-01 RX ADMIN — SODIUM CHLORIDE 100 ML/HR: 4.5 INJECTION, SOLUTION INTRAVENOUS at 10:03

## 2023-01-01 RX ADMIN — VASOPRESSIN 2 UNITS: 20 INJECTION, SOLUTION INTRAVENOUS at 15:34

## 2023-01-01 RX ADMIN — ONDANSETRON 4 MG: 2 INJECTION INTRAMUSCULAR; INTRAVENOUS at 16:41

## 2023-01-01 RX ADMIN — ANTACID TABLETS 2 TABLET: 500 TABLET, CHEWABLE ORAL at 20:45

## 2023-01-01 RX ADMIN — CEFAZOLIN SODIUM 2 G: 2 INJECTION, SOLUTION INTRAVENOUS at 15:03

## 2023-01-01 RX ADMIN — POTASSIUM CHLORIDE AND SODIUM CHLORIDE 125 ML/HR: 900; 150 INJECTION, SOLUTION INTRAVENOUS at 05:17

## 2023-01-01 RX ADMIN — HYDROMORPHONE HYDROCHLORIDE 0.25 MG: 1 INJECTION, SOLUTION INTRAMUSCULAR; INTRAVENOUS; SUBCUTANEOUS at 17:42

## 2023-01-01 RX ADMIN — FAMOTIDINE 20 MG: 10 INJECTION INTRAVENOUS at 21:19

## 2023-01-01 RX ADMIN — HEPARIN SODIUM 3000 UNITS: 1000 INJECTION, SOLUTION INTRAVENOUS; SUBCUTANEOUS at 15:59

## 2023-01-01 RX ADMIN — AMLODIPINE BESYLATE 10 MG: 10 TABLET ORAL at 08:38

## 2023-01-01 RX ADMIN — FENTANYL CITRATE 25 MCG: 50 INJECTION, SOLUTION INTRAMUSCULAR; INTRAVENOUS at 16:24

## 2023-01-01 RX ADMIN — HEPARIN SODIUM 5000 UNITS: 5000 INJECTION INTRAVENOUS; SUBCUTANEOUS at 08:52

## 2023-01-01 RX ADMIN — POTASSIUM CHLORIDE 40 MEQ: 1.5 POWDER, FOR SOLUTION ORAL at 00:31

## 2023-01-01 RX ADMIN — FAMOTIDINE 40 MG: 20 TABLET, FILM COATED ORAL at 18:14

## 2023-01-01 RX ADMIN — FAMOTIDINE 40 MG: 20 TABLET, FILM COATED ORAL at 08:09

## 2023-01-01 RX ADMIN — PROPOFOL 50 MCG/KG/MIN: 10 INJECTION, EMULSION INTRAVENOUS at 15:17

## 2023-01-01 RX ADMIN — HYDROMORPHONE HYDROCHLORIDE 0.5 MG: 1 INJECTION, SOLUTION INTRAMUSCULAR; INTRAVENOUS; SUBCUTANEOUS at 05:53

## 2023-01-01 RX ADMIN — HYDROMORPHONE HYDROCHLORIDE 0.5 MG: 1 INJECTION, SOLUTION INTRAMUSCULAR; INTRAVENOUS; SUBCUTANEOUS at 10:03

## 2023-01-01 RX ADMIN — SUCRALFATE 1 G: 1 TABLET ORAL at 08:38

## 2023-01-01 RX ADMIN — HYDROMORPHONE HYDROCHLORIDE 0.5 MG: 1 INJECTION, SOLUTION INTRAMUSCULAR; INTRAVENOUS; SUBCUTANEOUS at 15:33

## 2023-01-01 RX ADMIN — SODIUM CHLORIDE, POTASSIUM CHLORIDE, SODIUM LACTATE AND CALCIUM CHLORIDE 9 ML/HR: 600; 310; 30; 20 INJECTION, SOLUTION INTRAVENOUS at 13:20

## 2023-01-01 RX ADMIN — ANTACID TABLETS 2 TABLET: 500 TABLET, CHEWABLE ORAL at 08:09

## 2023-01-01 RX ADMIN — CALCIUM POLYCARBOPHIL 625 MG: 625 TABLET, FILM COATED ORAL at 20:45

## 2023-01-01 RX ADMIN — HYDROMORPHONE HYDROCHLORIDE 0.5 MG: 1 INJECTION, SOLUTION INTRAMUSCULAR; INTRAVENOUS; SUBCUTANEOUS at 02:25

## 2023-01-01 RX ADMIN — ONDANSETRON 4 MG: 2 INJECTION INTRAMUSCULAR; INTRAVENOUS at 20:52

## 2023-08-11 PROBLEM — R11.10 VOMITING AND DIARRHEA: Status: ACTIVE | Noted: 2023-01-01

## 2023-08-11 PROBLEM — R19.7 VOMITING AND DIARRHEA: Status: ACTIVE | Noted: 2023-01-01

## 2023-08-12 NOTE — ED NOTES
"Nursing report ED to floor  Lavern Mcduffie  77 y.o.  female    HPI :   Chief Complaint   Patient presents with    Abdominal Pain    Diarrhea    Vomiting       Admitting doctor:   Nimesh Smith MD    Admitting diagnosis:   The primary encounter diagnosis was Vomiting and diarrhea. Diagnoses of Lactic acidosis, Leukocytosis, unspecified type, and Pleural effusion, left were also pertinent to this visit.    Code status:   Current Code Status       Date Active Code Status Order ID Comments User Context       Prior            Allergies:   Patient has no known allergies.    Isolation:   No active isolations    Intake and Output    Intake/Output Summary (Last 24 hours) at 8/11/2023 2241  Last data filed at 8/11/2023 2157  Gross per 24 hour   Intake 1500 ml   Output --   Net 1500 ml       Weight:       08/11/23 2017   Weight: 44 kg (97 lb)       Most recent vitals:   Vitals:    08/11/23 2017 08/11/23 2028 08/11/23 2200   BP: 152/66 137/69 113/58   Pulse: 99 101 97   Resp: 18     Temp: 97.6 øF (36.4 øC)     SpO2: 96%  90%   Weight: 44 kg (97 lb)     Height: 152.4 cm (60\")         Active LDAs/IV Access:   Lines, Drains & Airways       Active LDAs       Name Placement date Placement time Site Days    Peripheral IV 08/11/23 2016 Right Antecubital 08/11/23 2016  Antecubital  less than 1                    Labs (abnormal labs have a star):   Labs Reviewed   COMPREHENSIVE METABOLIC PANEL - Abnormal; Notable for the following components:       Result Value    BUN 32 (*)     Creatinine 1.45 (*)     Sodium 134 (*)     Potassium 3.2 (*)     CO2 11.4 (*)     Calcium 7.0 (*)     Total Protein 5.2 (*)     Albumin 2.0 (*)     AST (SGOT) 44 (*)     Alkaline Phosphatase 186 (*)     Anion Gap 22.6 (*)     eGFR 36.8 (*)     All other components within normal limits    Narrative:     GFR Normal >60  Chronic Kidney Disease <60  Kidney Failure <15    The GFR formula is only valid for adults with stable renal function between ages 18 and 70. "   LIPASE - Abnormal; Notable for the following components:    Lipase 12 (*)     All other components within normal limits   CBC WITH AUTO DIFFERENTIAL - Abnormal; Notable for the following components:    WBC 22.49 (*)     Neutrophil % 93.6 (*)     Lymphocyte % 1.4 (*)     Monocyte % 4.0 (*)     Eosinophil % 0.0 (*)     Immature Grans % 0.8 (*)     Neutrophils, Absolute 21.07 (*)     Lymphocytes, Absolute 0.31 (*)     Immature Grans, Absolute 0.17 (*)     All other components within normal limits   LACTIC ACID, PLASMA - Abnormal; Notable for the following components:    Lactate 3.4 (*)     All other components within normal limits   BLOOD CULTURE   BLOOD CULTURE   LACTIC ACID, REFLEX   CBC AND DIFFERENTIAL    Narrative:     The following orders were created for panel order CBC & Differential.  Procedure                               Abnormality         Status                     ---------                               -----------         ------                     CBC Auto Differential[[id number]]        Abnormal            Final result                 Please view results for these tests on the individual orders.       EKG:   No orders to display       Meds given in ED:   Medications   sodium chloride 0.9 % flush 10 mL (has no administration in time range)   lactated ringers bolus 1,000 mL (1,000 mL Intravenous New Bag 8/11/23 2159)   sodium chloride 0.9 % bolus 1,000 mL (0 mL Intravenous Stopped 8/11/23 2157)   HYDROmorphone (DILAUDID) injection 0.5 mg (0.5 mg Intravenous Given 8/11/23 2052)   ondansetron (ZOFRAN) injection 4 mg (4 mg Intravenous Given 8/11/23 2052)   iopamidol (ISOVUE-300) 61 % injection 100 mL (85 mL Intravenous Given 8/11/23 2116)   piperacillin-tazobactam (ZOSYN) 3.375 g in iso-osmotic dextrose 50 ml (premix) (3.375 g Intravenous New Bag 8/11/23 2202)       Imaging results:  CT Abdomen Pelvis With Contrast    Result Date: 8/11/2023  Electronically signed by Angelito Colon MD on 08-11-23 at  2232     Ambulatory status:   - up with assist     Social issues:   Social History     Socioeconomic History    Marital status:    Tobacco Use    Smoking status: Former     Packs/day: 0.50     Years: 30.00     Pack years: 15.00     Types: Cigarettes     Quit date:      Years since quittin.6    Smokeless tobacco: Never   Vaping Use    Vaping Use: Never used   Substance and Sexual Activity    Alcohol use: Yes     Comment: SOCIALLY    Drug use: Never    Sexual activity: Defer     Birth control/protection: Post-menopausal       NIH Stroke Scale:       Michael Knox RN  23 22:41 EDT    '

## 2023-08-12 NOTE — PLAN OF CARE
Goal Outcome Evaluation:      Pt Aox4 resting in bed , Aox4, admitted this shift, database completed. IVF infusing , SR/ST on tele 2 L NC when asleep.Wound consult was placed.  Plan of care ongoing.

## 2023-08-12 NOTE — ED PROVIDER NOTES
EMERGENCY DEPARTMENT ENCOUNTER    Room Number:  14  PCP: Ellen Hayes MD  Historian: Patient      HPI:  Chief Complaint: Abdominal pain, vomiting and diarrhea  A complete HPI/ROS/PMH/PSH/SH/FH are unobtainable due to: Nothing  Context: Lavern Mcduffie is a 77 y.o. female who presents to the ED c/o abdominal pain, vomiting, diarrhea.  She reports she has been feeling well for about a week.  The pain intensified today.  She denies fever or chills.  She reports she just had 1 bowel movement today which was loose, watery.  Anytime she tries to eat or drink anything she feels like she is going to throw up.  She has been throwing up a little bit, mostly just dry heaving.  She does report decreased appetite for the last 1 week.  She has a history of pancreatic cancer status post surgery last year.  She reports that she is currently in remission.  The pain is mostly across the lower abdomen.  She believes that she may have been on antibiotics sometime in the last 3 months but cannot recall exactly what.  She denies history of C. difficile colitis.            PAST MEDICAL HISTORY  Active Ambulatory Problems     Diagnosis Date Noted    H/O total hip arthroplasty 02/24/2021    DJD (degenerative joint disease) 02/24/2021    Preop cardiovascular exam 02/26/2021    Abnormal EKG 02/26/2021     Resolved Ambulatory Problems     Diagnosis Date Noted    No Resolved Ambulatory Problems     Past Medical History:   Diagnosis Date    Arthritis     Hyperlipidemia     Hypertension     Right hip pain          PAST SURGICAL HISTORY  Past Surgical History:   Procedure Laterality Date    COLONOSCOPY      INGUINAL HERNIA REPAIR Left     TONSILLECTOMY      TOTAL HIP ARTHROPLASTY Left     TOTAL HIP ARTHROPLASTY Right 2/24/2021    Procedure: RIGHT TOTAL HIP ARTHROPLASTY ANTERIOR WITH HANA TABLE;  Surgeon: Sven Rouse MD;  Location: [facility] OR;  Service: Orthopedics;  Laterality: Right;         FAMILY HISTORY  Family History   Problem  Relation Age of Onset    Heart attack Father     Heart disease Father     Malig Hyperthermia Neg Hx          SOCIAL HISTORY  Social History     Socioeconomic History    Marital status:    Tobacco Use    Smoking status: Former     Packs/day: 0.50     Years: 30.00     Pack years: 15.00     Types: Cigarettes     Quit date:      Years since quittin.6    Smokeless tobacco: Never   Vaping Use    Vaping Use: Never used   Substance and Sexual Activity    Alcohol use: Yes     Comment: SOCIALLY    Drug use: Never    Sexual activity: Defer     Birth control/protection: Post-menopausal         ALLERGIES  Patient has no known allergies.        REVIEW OF SYSTEMS  Review of Systems   Review of all 14 systems is negative other than stated in the HPI above.      PHYSICAL EXAM  ED Triage Vitals [23 2017]   Temp Heart Rate Resp BP SpO2   97.6 øF (36.4 øC) 99 18 152/66 96 %      Temp src Heart Rate Source Patient Position BP Location FiO2 (%)   -- -- -- -- --       Physical Exam      GENERAL: Awake and alert, chronically ill-appearing, cachectic, no acute distress  HENT: nares patent, mucous membranes dry  EYES: no scleral icterus, EOMI  CV: regular rhythm, normal rate, port present right anterior chest wall  RESPIRATORY: normal effort  ABDOMEN: soft,, nondistended, moderate tenderness across the lower abdomen without rebound or guarding.  No palpable hernia or mass.  MUSCULOSKELETAL: no deformity  NEURO: alert, moves all extremities, follows commands, cranial nerves II through XII intact, speech fluent and clear  PSYCH:  calm, cooperative  SKIN: warm, dry    Vital signs and nursing notes reviewed.          LAB RESULTS  Recent Results (from the past 24 hour(s))   Comprehensive Metabolic Panel    Collection Time: 23  8:29 PM    Specimen: Blood   Result Value Ref Range    Glucose 88 65 - 99 mg/dL    BUN 32 (H) 8 - 23 mg/dL    Creatinine 1.45 (H) 0.57 - 1.00 mg/dL    Sodium 134 (L) 136 - 145 mmol/L    Potassium  3.2 (L) 3.5 - 5.2 mmol/L    Chloride 100 98 - 107 mmol/L    CO2 11.4 (L) 22.0 - 29.0 mmol/L    Calcium 7.0 (L) 8.6 - 10.5 mg/dL    Total Protein 5.2 (L) 6.0 - 8.5 g/dL    Albumin 2.0 (L) 3.5 - 5.2 g/dL    ALT (SGPT) 22 1 - 33 U/L    AST (SGOT) 44 (H) 1 - 32 U/L    Alkaline Phosphatase 186 (H) 39 - 117 U/L    Total Bilirubin 0.4 0.0 - 1.2 mg/dL    Globulin 3.2 gm/dL    A/G Ratio 0.6 g/dL    BUN/Creatinine Ratio 22.1 7.0 - 25.0    Anion Gap 22.6 (H) 5.0 - 15.0 mmol/L    eGFR 36.8 (L) >60.0 mL/min/1.73   Lipase    Collection Time: 08/11/23  8:29 PM    Specimen: Blood   Result Value Ref Range    Lipase 12 (L) 13 - 60 U/L   CBC Auto Differential    Collection Time: 08/11/23  8:29 PM    Specimen: Blood   Result Value Ref Range    WBC 22.49 (H) 3.40 - 10.80 10*3/mm3    RBC 4.13 3.77 - 5.28 10*6/mm3    Hemoglobin 12.0 12.0 - 15.9 g/dL    Hematocrit 36.9 34.0 - 46.6 %    MCV 89.3 79.0 - 97.0 fL    MCH 29.1 26.6 - 33.0 pg    MCHC 32.5 31.5 - 35.7 g/dL    RDW 12.9 12.3 - 15.4 %    RDW-SD 41.8 37.0 - 54.0 fl    MPV 9.4 6.0 - 12.0 fL    Platelets 279 140 - 450 10*3/mm3    Neutrophil % 93.6 (H) 42.7 - 76.0 %    Lymphocyte % 1.4 (L) 19.6 - 45.3 %    Monocyte % 4.0 (L) 5.0 - 12.0 %    Eosinophil % 0.0 (L) 0.3 - 6.2 %    Basophil % 0.2 0.0 - 1.5 %    Immature Grans % 0.8 (H) 0.0 - 0.5 %    Neutrophils, Absolute 21.07 (H) 1.70 - 7.00 10*3/mm3    Lymphocytes, Absolute 0.31 (L) 0.70 - 3.10 10*3/mm3    Monocytes, Absolute 0.89 0.10 - 0.90 10*3/mm3    Eosinophils, Absolute 0.00 0.00 - 0.40 10*3/mm3    Basophils, Absolute 0.05 0.00 - 0.20 10*3/mm3    Immature Grans, Absolute 0.17 (H) 0.00 - 0.05 10*3/mm3    nRBC 0.0 0.0 - 0.2 /100 WBC   Lactic Acid, Plasma    Collection Time: 08/11/23  9:01 PM    Specimen: Blood   Result Value Ref Range    Lactate 3.4 (C) 0.5 - 2.0 mmol/L       Ordered the above labs and reviewed the results.        RADIOLOGY  CT Abdomen Pelvis With Contrast    Result Date: 8/11/2023  Patient: DONELL MODI  Time Out:  22:32 Exam(s): CT ABDOMEN + PELVIS With Contrast EXAM:   CT Abdomen and Pelvis With Intravenous Contrast CLINICAL HISTORY:   Reason for exam: abd pain, diarrhea, leukocytosis. TECHNIQUE:   Axial computed tomography images of the abdomen and pelvis with intravenous contrast.  CTDI is 5.79 mGy and DLP is 226.5 mGy-cm.  This CT exam was performed according to the principle of ALARA (As Low As Reasonably Achievable) by using one or more of the following dose reduction techniques: automated exposure control, adjustment of the mA and or kV according to patient size, and or use of iterative reconstruction technique. COMPARISON:   No relevant prior studies available. FINDINGS:   Lung bases:  Unremarkable.  No mass.  No consolidation.   Pleural space:  Left pleural effusion.  ABDOMEN:   Liver:  Unremarkable.  No mass.   Gallbladder and bile ducts:  Unremarkable.  No calcified stones.  No ductal dilation.   Pancreas:  Unremarkable.  No mass.  No ductal dilation.   Spleen:  Unremarkable.  No splenomegaly.   Adrenals:  Unremarkable.  No mass.   Kidneys and ureters:  Simple 2.7 cm left renal cyst.  No follow-up of this simple cyst is necessary.  No hydronephrosis.   Stomach and bowel:  Postoperative changes of the stomach.  Diverticulosis without evidence of diverticulitis.  No obstruction.  PELVIS:   Appendix:  No findings to suggest acute appendicitis.   Bladder:  Unremarkable.  No mass.   Reproductive:  Unremarkable as visualized.  ABDOMEN and PELVIS:   Intraperitoneal space:  Unremarkable.  No free air.  No significant fluid collection.   Bones joints:  L1 and T11 vertebral body compression fracture resulting in 80 and 60% vertebral body height loss prospectively.  Streak artifact from the patient's bilateral hip arthroplasties limits evaluation.  No dislocation.   Soft tissues:  Unremarkable.   Vasculature:  Unremarkable.  No abdominal aortic aneurysm.   Lymph nodes:  Unremarkable.  No enlarged lymph nodes. IMPRESSION:        Left pleural effusion.     Electronically signed by Angelito Colon MD on 08-11-23 at 2232     Ordered the above noted radiological studies. Reviewed by me in PACS.            PROCEDURES  Procedures              MEDICATIONS GIVEN IN ER  Medications   sodium chloride 0.9 % flush 10 mL (has no administration in time range)   sodium chloride 0.9 % bolus 1,000 mL (0 mL Intravenous Stopped 8/11/23 2157)   HYDROmorphone (DILAUDID) injection 0.5 mg (0.5 mg Intravenous Given 8/11/23 2052)   ondansetron (ZOFRAN) injection 4 mg (4 mg Intravenous Given 8/11/23 2052)   iopamidol (ISOVUE-300) 61 % injection 100 mL (85 mL Intravenous Given 8/11/23 2116)   lactated ringers bolus 1,000 mL (1,000 mL Intravenous New Bag 8/11/23 2159)   piperacillin-tazobactam (ZOSYN) 3.375 g in iso-osmotic dextrose 50 ml (premix) (3.375 g Intravenous New Bag 8/11/23 2202)                   MEDICAL DECISION MAKING, PROGRESS, and CONSULTS    All labs have been independently reviewed by me.  All radiology studies have been reviewed by me and I have also reviewed the radiology report.   EKG's independently viewed and interpreted by me.  Discussion below represents my analysis of pertinent findings related to patient's condition, differential diagnosis, treatment plan and final disposition.      Additional sources:  - Discussed/ obtained information from independent historians: N/A    - External (non-ED) record review: N/A    - Chronic or social conditions impacting care: Malnutrition    - Shared decision making: Patient informed of plan for admission for continued IV hydration, empiric antibiotics while awaiting blood cultures      Orders placed during this visit:  Orders Placed This Encounter   Procedures    Blood Culture - Blood,    Blood Culture - Blood,    CT Abdomen Pelvis With Contrast    Comprehensive Metabolic Panel    Lipase    CBC Auto Differential    Lactic Acid, Plasma    STAT Lactic Acid, Reflex    LIPPS (on-call MD unless specified)     IP Palliative Care Nurse Consult    Insert Peripheral IV    Initiate Observation Status    CBC & Differential               Differential diagnosis includes but is not limited to:    Sepsis, C. difficile colitis, diverticulitis, colitis, gastroenteritis, small bowel obstruction,      Independent interpretation of labs, radiology studies, and discussions with consultants:  ED Course as of 08/11/23 2333   Fri Aug 11, 2023   2039 WBC(!): 22.49 [JR]   2039 Patient presents with complaint of vomiting and diarrhea with associated abdominal pain for the last 1 week.  She does have leukocytosis of 22.  Differential would include C. difficile colitis, diverticulitis, small bowel obstruction. [JR]   2139 CT abdomen pelvis independently interpreted by me in PACS.  There appears to be a small left pleural effusion.  There is liquid stool throughout the small and large bowel consistent with enteritis.  Evaluation of the pelvis is limited due to to streak artifact from patient's bilateral hip arthroplasty. [JR]   2155 Lactate(!!): 3.4 [JR]   2156 Serum lactate is elevated.  I have ordered empiric Zosyn and additional IV fluids. [JR]   2237 Discussed with Dr. Smith, who agrees to admit. [JR]   2237 CT abdomen demonstrates no acute intra-abdominal pathology.  Patient may just have a gastroenteritis.  C. difficile is a consideration however she only had 1 loose stool today and there is not evidence of colitis on CT.  She will be admitted for follow-up of blood cultures, continued IV fluid resuscitation. [JR]      ED Course User Index  [JR] Godfrey Arrieta MD             DIAGNOSIS  Final diagnoses:   Vomiting and diarrhea   Lactic acidosis   Leukocytosis, unspecified type   Pleural effusion, left         DISPOSITION  Admit            Latest Documented Vital Signs:  As of 23:33 EDT  BP- 107/57 HR- 92 Temp- 97.6 øF (36.4 øC) O2 sat- 96%              --    Please note that portions of this were completed with a voice recognition  program.       Note Disclaimer: At [facility], we believe that sharing information builds trust and better relationships. You are receiving this note because you are receiving care at [facility] or recently visited. It is possible you will see health information before a provider has talked with you about it. This kind of information can be easy to misunderstand. To help you fully understand what it means for your health, we urge you to discuss this note with your provider.             Godfrey Arrieta MD  08/11/23 7610

## 2023-08-12 NOTE — CONSULTS
[facility]                                                                                             Nephrology Initial Consult Note    Patient Identification:  Name: Lavern Mcduffie MRN: [id number]  Age: 77 y.o. : 1945  Sex: female  Date:2023    Requesting Physician: As per consult order.  Reason for Consultation: arf with metabolic acidosis  Information from:patient/ family/ chart      History of Present Illness: This is a 77 y.o. year old female  came to ER for 1 week hx of abdominal pain with nausea, vomiting and diarrhea.  SHe is have 1-2 stool per day which are loose.  She has a hx of pancreatic cancer with resection last year.  Bp has been on low side.  No hx of ckd   she was on arb at home for HTN       The following medical history and medications personally reviewed by me:    Problem List:   Patient Active Problem List    Diagnosis     *Vomiting and diarrhea [R11.10, R19.7]     Preop cardiovascular exam [Z01.810]     Abnormal EKG [R94.31]     H/O total hip arthroplasty [Z96.649]     DJD (degenerative joint disease) [M19.90]        Past Medical History:  Past Medical History:   Diagnosis Date    Arthritis     Hyperlipidemia     Hypertension     Right hip pain        Past Surgical History:  Past Surgical History:   Procedure Laterality Date    COLONOSCOPY      INGUINAL HERNIA REPAIR Left     TONSILLECTOMY      TOTAL HIP ARTHROPLASTY Left     TOTAL HIP ARTHROPLASTY Right 2021    Procedure: RIGHT TOTAL HIP ARTHROPLASTY ANTERIOR WITH HANA TABLE;  Surgeon: Sven Rouse MD;  Location: [facility];  Service: Orthopedics;  Laterality: Right;        Home Meds:   Medications Prior to Admission   Medication Sig Dispense Refill Last Dose    Dietary Management Product (Vasculera) tablet Take  by mouth.   2023    amoxicillin (AMOXIL) 500 MG capsule Take 4 capsules by mouth Take As Directed. PRIOR TO DENTAL APPT       lidocaine (LIDODERM) 5 % Place 1 patch on the skin  as directed by provider Daily. Remove & Discard patch within 12 hours or as directed by MD 15 each 0     losartan (COZAAR) 100 MG tablet Take 1 tablet by mouth Daily.       lovastatin (MEVACOR) 40 MG tablet Take 1 tablet by mouth Daily.          Current Meds:   Current Facility-Administered Medications   Medication Dose Route Frequency Provider Last Rate Last Admin    amLODIPine (NORVASC) tablet 10 mg  10 mg Oral Daily Nimesh Smith MD        famotidine (PEPCID) injection 20 mg  20 mg Intravenous Q12H Nimesh Smith MD   20 mg at 23 0924    hydrALAZINE (APRESOLINE) injection 10 mg  10 mg Intravenous Q4H PRN Nimesh Smith MD        HYDROmorphone (DILAUDID) injection 0.5 mg  0.5 mg Intravenous Q4H PRN Nimesh Smith MD   0.5 mg at 23 0951    ondansetron (ZOFRAN) injection 4 mg  4 mg Intravenous Q6H PRN Nimesh Smith MD   4 mg at 23 0650    piperacillin-tazobactam (ZOSYN) 3.375 g in iso-osmotic dextrose 50 ml (premix)  3.375 g Intravenous Q8H Nimesh Smith MD        sodium chloride 0.9 % flush 10 mL  10 mL Intravenous PRN Godfrey Arrieta MD        sodium chloride 0.9 % with KCl 20 mEq/L infusion  125 mL/hr Intravenous Continuous Nimesh Smith  mL/hr at 23 0924 125 mL/hr at 23 0924       Allergies:  No Known Allergies    Social History:   Social History     Socioeconomic History    Marital status:    Tobacco Use    Smoking status: Former     Packs/day: 0.50     Years: 30.00     Pack years: 15.00     Types: Cigarettes     Quit date: 2016     Years since quittin.6    Smokeless tobacco: Never   Vaping Use    Vaping Use: Never used   Substance and Sexual Activity    Alcohol use: Yes     Comment: SOCIALLY    Drug use: Never    Sexual activity: Defer     Birth control/protection: Post-menopausal        Family History:  Family History   Problem Relation Age of Onset    Heart attack Father     Heart disease Father     Malig Hyperthermia Neg Hx         Review of Systems: as per  "HPI, in addition:    General:      + weakness / fatigue,                       No fevers / chills                       no weight loss  HEENT:       no dysphagia / odynophagia  Neck:           normal range of motion, no swelling  Respiratory: no cough / congestion                      No shortness of air                       No wheezing  CV:              No chest pain                       No palpitations  Abdomen/GI: + nausea / vomiting                      No / constipation                    No abdominal pain  :             no dysuria / urinary frequency                       No urgency, normal output  Endocrine:   no polyuria / polydipsia,                      No heat or cold intolerance  Skin:           no rashes or skin breakdown   Vascular:   no  edema                     No claudication  Psych:        no depression/ anxiety  Neuro:        no focal weakness, no seizures  Musculoskeletal: no joint pain or deformities      Physical Exam:  Vitals:   Temp (24hrs), Av.4 øF (36.3 øC), Min:97.2 øF (36.2 øC), Max:97.7 øF (36.5 øC)    /57 (BP Location: Right arm, Patient Position: Lying)   Pulse 100   Temp 97.2 øF (36.2 øC) (Oral)   Resp 16   Ht 152.4 cm (60\")   Wt 44 kg (97 lb)   SpO2 96%   BMI 18.94 kg/mý   Intake/Output:     Intake/Output Summary (Last 24 hours) at 2023 1308  Last data filed at 2023 2315  Gross per 24 hour   Intake 1600 ml   Output --   Net 1600 ml        Wt Readings from Last 1 Encounters:   23 44 kg (97 lb)       Exam:    General Appearance:  Awake, alert, oriented x3, no acute distress  frail-appearing   Head and Face:  Normocephalic, atraumatic, mucus membranes moist, oropharynx clear   Eyes:  No icterus, pupils equal round and reactive to light, extraocular movements intact    ENMT: Moist mucosa, tongue symmetric    Neck: Supple  no jugular venous distention  no thyromegaly   Pulmonary:  Respiratory effort: Normal  Auscultation of lungs: Clear " bilaterally  No wheezes  No rhonchi  Good air movement, good expansion   Chest wall:  No tenderness or deformity   Cardiovascular:  Auscultation of the heart: Normal rhythm, no murmurs  No edema of extremities    Abdomen:  Abdomen: soft, non-tender, normal bowel sounds all four quadrants, no masses   Liver and spleen: no hepatosplenomegaly   Musculoskeletal: Digits and nails: normal  Normal range of motion  No joint swelling or gross deformities    Skin: Skin inspection: color normal, no visible rashes or lesions  Skin palpation: texture, turgor normal, no palpable lesions   Lymphatic:  no cervical lymphadenopathy    Psychiatric: Judgement and insight: normal  Orientation to person place and time: normal  Mood and affect: normal       DATA:  Radiology and Labs:  The following labs and radiology results independently reviewed by me              Labs:   Recent Results (from the past 24 hour(s))   Comprehensive Metabolic Panel    Collection Time: 08/11/23  8:29 PM    Specimen: Blood   Result Value Ref Range    Glucose 88 65 - 99 mg/dL    BUN 32 (H) 8 - 23 mg/dL    Creatinine 1.45 (H) 0.57 - 1.00 mg/dL    Sodium 134 (L) 136 - 145 mmol/L    Potassium 3.2 (L) 3.5 - 5.2 mmol/L    Chloride 100 98 - 107 mmol/L    CO2 11.4 (L) 22.0 - 29.0 mmol/L    Calcium 7.0 (L) 8.6 - 10.5 mg/dL    Total Protein 5.2 (L) 6.0 - 8.5 g/dL    Albumin 2.0 (L) 3.5 - 5.2 g/dL    ALT (SGPT) 22 1 - 33 U/L    AST (SGOT) 44 (H) 1 - 32 U/L    Alkaline Phosphatase 186 (H) 39 - 117 U/L    Total Bilirubin 0.4 0.0 - 1.2 mg/dL    Globulin 3.2 gm/dL    A/G Ratio 0.6 g/dL    BUN/Creatinine Ratio 22.1 7.0 - 25.0    Anion Gap 22.6 (H) 5.0 - 15.0 mmol/L    eGFR 36.8 (L) >60.0 mL/min/1.73   Lipase    Collection Time: 08/11/23  8:29 PM    Specimen: Blood   Result Value Ref Range    Lipase 12 (L) 13 - 60 U/L   CBC Auto Differential    Collection Time: 08/11/23  8:29 PM    Specimen: Blood   Result Value Ref Range    WBC 22.49 (H) 3.40 - 10.80 10*3/mm3    RBC 4.13  3.77 - 5.28 10*6/mm3    Hemoglobin 12.0 12.0 - 15.9 g/dL    Hematocrit 36.9 34.0 - 46.6 %    MCV 89.3 79.0 - 97.0 fL    MCH 29.1 26.6 - 33.0 pg    MCHC 32.5 31.5 - 35.7 g/dL    RDW 12.9 12.3 - 15.4 %    RDW-SD 41.8 37.0 - 54.0 fl    MPV 9.4 6.0 - 12.0 fL    Platelets 279 140 - 450 10*3/mm3    Neutrophil % 93.6 (H) 42.7 - 76.0 %    Lymphocyte % 1.4 (L) 19.6 - 45.3 %    Monocyte % 4.0 (L) 5.0 - 12.0 %    Eosinophil % 0.0 (L) 0.3 - 6.2 %    Basophil % 0.2 0.0 - 1.5 %    Immature Grans % 0.8 (H) 0.0 - 0.5 %    Neutrophils, Absolute 21.07 (H) 1.70 - 7.00 10*3/mm3    Lymphocytes, Absolute 0.31 (L) 0.70 - 3.10 10*3/mm3    Monocytes, Absolute 0.89 0.10 - 0.90 10*3/mm3    Eosinophils, Absolute 0.00 0.00 - 0.40 10*3/mm3    Basophils, Absolute 0.05 0.00 - 0.20 10*3/mm3    Immature Grans, Absolute 0.17 (H) 0.00 - 0.05 10*3/mm3    nRBC 0.0 0.0 - 0.2 /100 WBC   Lactic Acid, Plasma    Collection Time: 08/11/23  9:01 PM    Specimen: Blood   Result Value Ref Range    Lactate 3.4 (C) 0.5 - 2.0 mmol/L   STAT Lactic Acid, Reflex    Collection Time: 08/12/23  6:37 AM    Specimen: Blood   Result Value Ref Range    Lactate 2.2 (C) 0.5 - 2.0 mmol/L   CBC Auto Differential    Collection Time: 08/12/23  6:37 AM    Specimen: Blood   Result Value Ref Range    WBC 24.05 (H) 3.40 - 10.80 10*3/mm3    RBC 4.27 3.77 - 5.28 10*6/mm3    Hemoglobin 12.4 12.0 - 15.9 g/dL    Hematocrit 37.7 34.0 - 46.6 %    MCV 88.3 79.0 - 97.0 fL    MCH 29.0 26.6 - 33.0 pg    MCHC 32.9 31.5 - 35.7 g/dL    RDW 12.8 12.3 - 15.4 %    RDW-SD 41.1 37.0 - 54.0 fl    MPV 9.2 6.0 - 12.0 fL    Platelets 284 140 - 450 10*3/mm3    Neutrophil % 91.2 (H) 42.7 - 76.0 %    Lymphocyte % 2.2 (L) 19.6 - 45.3 %    Monocyte % 5.2 5.0 - 12.0 %    Eosinophil % 0.5 0.3 - 6.2 %    Basophil % 0.2 0.0 - 1.5 %    Immature Grans % 0.7 (H) 0.0 - 0.5 %    Neutrophils, Absolute 21.91 (H) 1.70 - 7.00 10*3/mm3    Lymphocytes, Absolute 0.54 (L) 0.70 - 3.10 10*3/mm3    Monocytes, Absolute 1.25 (H) 0.10  - 0.90 10*3/mm3    Eosinophils, Absolute 0.13 0.00 - 0.40 10*3/mm3    Basophils, Absolute 0.06 0.00 - 0.20 10*3/mm3    Immature Grans, Absolute 0.16 (H) 0.00 - 0.05 10*3/mm3    nRBC 0.0 0.0 - 0.2 /100 WBC       Radiology:  Imaging Results (Last 24 Hours)       Procedure Component Value Units Date/Time    CT Abdomen Pelvis With Contrast [[id number]] Collected: 08/11/23 2232     Updated: 08/11/23 2232    Narrative:        Patient: DONELL MODI  Time Out: 22:32  Exam(s): CT ABDOMEN + PELVIS With Contrast     EXAM:    CT Abdomen and Pelvis With Intravenous Contrast    CLINICAL HISTORY:    Reason for exam: abd pain, diarrhea, leukocytosis.    TECHNIQUE:    Axial computed tomography images of the abdomen and pelvis with   intravenous contrast.  CTDI is 5.79 mGy and DLP is 226.5 mGy-cm.  This CT   exam was performed according to the principle of ALARA (As Low As   Reasonably Achievable) by using one or more of the following dose   reduction techniques: automated exposure control, adjustment of the mA   and or kV according to patient size, and or use of iterative   reconstruction technique.    COMPARISON:    No relevant prior studies available.    FINDINGS:    Lung bases:  Unremarkable.  No mass.  No consolidation.    Pleural space:  Left pleural effusion.     ABDOMEN:    Liver:  Unremarkable.  No mass.    Gallbladder and bile ducts:  Unremarkable.  No calcified stones.  No   ductal dilation.    Pancreas:  Unremarkable.  No mass.  No ductal dilation.    Spleen:  Unremarkable.  No splenomegaly.    Adrenals:  Unremarkable.  No mass.    Kidneys and ureters:  Simple 2.7 cm left renal cyst.  No follow-up of   this simple cyst is necessary.  No hydronephrosis.    Stomach and bowel:  Postoperative changes of the stomach.    Diverticulosis without evidence of diverticulitis.  No obstruction.     PELVIS:    Appendix:  No findings to suggest acute appendicitis.    Bladder:  Unremarkable.  No mass.    Reproductive:  Unremarkable  as visualized.     ABDOMEN and PELVIS:    Intraperitoneal space:  Unremarkable.  No free air.  No significant   fluid collection.    Bones joints:  L1 and T11 vertebral body compression fracture resulting   in 80 and 60% vertebral body height loss prospectively.  Streak artifact   from the patient's bilateral hip arthroplasties limits evaluation.  No   dislocation.    Soft tissues:  Unremarkable.    Vasculature:  Unremarkable.  No abdominal aortic aneurysm.    Lymph nodes:  Unremarkable.  No enlarged lymph nodes.    IMPRESSION:         Left pleural effusion.      Impression:          Electronically signed by Angelito Colon MD on 08-11-23 at 2232                 ASSESSMENT:   Problem List:   ARF  Hypokalemia   Metabolic acidosis   Vomiting with diarrhea   HTN      Vomiting and diarrhea        PLAN:   Arf due to volume depletion   Continue on IV fluids, will add bicarb to fluids   Leave off arb at this time   On norvasc for HTN     Monitor electrolytes and volume closely   Dose all medications for GFR <60   Limit IV dye, NSAIDS and nephrotoxic medications   I appreciate the opportunity to participate in this patient's care.  Please call with any questions or concerns.     Bonita Patel M.D  [facility]  Office phone number: [phone]  Answering service phone number: [phone]        8/12/2023

## 2023-08-12 NOTE — ED NOTES
Patient from home via [facility] reporting decreased PO intake, vomiting, diarrhea, and generalized abdominal pain x 1 week. Patient states she vomits every time she eats or drinks.

## 2023-08-12 NOTE — H&P
History and physical    Primary care physician      Chief complaint  Abdominal pain with nausea vomiting diarrhea    History of present illness  77-year-old white female with history of pancreatic cancer hypertension hyperlipidemia and peripheral vascular disease brought to the emergency room by the family with severe abdominal pain for last 1 week that she is not eating drinking.  Patient also complained of nausea vomiting after eating and has couple of loose stools.  Patient denies any fever chills black stools blood in the stools.  Patient afraid of eating as it hurts more when she eats.  Patient work-up in ER revealed acute kidney injury and probably has ischemic bowel admitted for management.  Patient is DNR per her wishes.    PAST MEDICAL HISTORY   Pancreatic cancer      Hyperlipidemia      Hypertension      Peripheral vascular disease        PAST SURGICAL HISTORY              Procedure Laterality Date    COLONOSCOPY        INGUINAL HERNIA REPAIR Left      TONSILLECTOMY        TOTAL HIP ARTHROPLASTY Left      TOTAL HIP ARTHROPLASTY Right 2021     Procedure: RIGHT TOTAL HIP ARTHROPLASTY ANTERIOR WITH HANA TABLE;  Surgeon: Sven Rouse MD;  Location: [facility];  Service: Orthopedics;  Laterality: Right;         FAMILY HISTORY           Problem Relation Age of Onset    Heart attack Father      Heart disease Father      Malig Hyperthermia Neg Hx        SOCIAL HISTORY                 Socioeconomic History    Marital status:    Tobacco Use    Smoking status: Former       Packs/day: 0.50       Years: 30.00       Pack years: 15.00       Types: Cigarettes       Quit date:        Years since quittin.6    Smokeless tobacco: Never   Vaping Use    Vaping Use: Never used   Substance and Sexual Activity    Alcohol use: Yes       Comment: SOCIALLY    Drug use: Never    Sexual activity: Defer       Birth control/protection: Post-menopausal         ALLERGIES  Patient has no known  "allergies.  Home medications reviewed     REVIEW OF SYSTEMS  Review of all 14 systems is negative other than stated in the HPI above.     PHYSICAL EXAM  Blood pressure 106/57, pulse 100, temperature 97.2 øF (36.2 øC), temperature source Oral, resp. rate 16, height 152.4 cm (60\"), weight 44 kg (97 lb), SpO2 96 %.    GENERAL: Awake and alert, chronically ill-appearing, cachectic, no acute distress  HENT: nares patent, mucous membranes dry  EYES: no scleral icterus, EOMI  CV: regular rhythm, normal rate, port present right anterior chest wall  RESPIRATORY: normal effort  ABDOMEN: soft,, nondistended, moderate tenderness bowel sounds positive  MUSCULOSKELETAL: no deformity  NEURO: alert, moves all extremities, follows commands, cranial nerves II through XII intact, speech fluent and clear  PSYCH:  calm, cooperative  SKIN: warm, dry     LAB RESULTS  Lab Results (last 24 hours)       Procedure Component Value Units Date/Time    STAT Lactic Acid, Reflex [[id number]]  (Abnormal) Collected: 08/12/23 0637    Specimen: Blood Updated: 08/12/23 0731     Lactate 2.2 mmol/L     Comprehensive Metabolic Panel [[id number]] Updated: 08/12/23 0715    Specimen: Blood     CBC & Differential [[id number]]  (Abnormal) Collected: 08/12/23 0637    Specimen: Blood Updated: 08/12/23 0707    Narrative:      The following orders were created for panel order CBC & Differential.  Procedure                               Abnormality         Status                     ---------                               -----------         ------                     CBC Auto Differential[[id number]]        Abnormal            Final result                 Please view results for these tests on the individual orders.    CBC Auto Differential [[id number]]  (Abnormal) Collected: 08/12/23 0637    Specimen: Blood Updated: 08/12/23 0707     WBC 24.05 10*3/mm3      RBC 4.27 10*6/mm3      Hemoglobin 12.4 g/dL      Hematocrit 37.7 %      MCV 88.3 fL      MCH 29.0 pg      MCHC " 32.9 g/dL      RDW 12.8 %      RDW-SD 41.1 fl      MPV 9.2 fL      Platelets 284 10*3/mm3      Neutrophil % 91.2 %      Lymphocyte % 2.2 %      Monocyte % 5.2 %      Eosinophil % 0.5 %      Basophil % 0.2 %      Immature Grans % 0.7 %      Neutrophils, Absolute 21.91 10*3/mm3      Lymphocytes, Absolute 0.54 10*3/mm3      Monocytes, Absolute 1.25 10*3/mm3      Eosinophils, Absolute 0.13 10*3/mm3      Basophils, Absolute 0.06 10*3/mm3      Immature Grans, Absolute 0.16 10*3/mm3      nRBC 0.0 /100 WBC     Lactic Acid, Plasma [[id number]]  (Abnormal) Collected: 08/11/23 2101    Specimen: Blood Updated: 08/11/23 2142     Lactate 3.4 mmol/L     Blood Culture - Blood, Arm, Left [[id number]] Collected: 08/11/23 2130    Specimen: Blood from Arm, Left Updated: 08/11/23 2135    Blood Culture - Blood, Arm, Right [[id number]] Collected: 08/11/23 2101    Specimen: Blood from Arm, Right Updated: 08/11/23 2106    Comprehensive Metabolic Panel [[id number]]  (Abnormal) Collected: 08/11/23 2029    Specimen: Blood Updated: 08/11/23 2058     Glucose 88 mg/dL      BUN 32 mg/dL      Creatinine 1.45 mg/dL      Sodium 134 mmol/L      Potassium 3.2 mmol/L      Comment: Slight hemolysis detected by analyzer. Results may be affected.        Chloride 100 mmol/L      CO2 11.4 mmol/L      Calcium 7.0 mg/dL      Total Protein 5.2 g/dL      Albumin 2.0 g/dL      ALT (SGPT) 22 U/L      AST (SGOT) 44 U/L      Comment: Slight hemolysis detected by analyzer. Results may be affected.        Alkaline Phosphatase 186 U/L      Total Bilirubin 0.4 mg/dL      Globulin 3.2 gm/dL      A/G Ratio 0.6 g/dL      BUN/Creatinine Ratio 22.1     Anion Gap 22.6 mmol/L      eGFR 36.8 mL/min/1.73     Narrative:      GFR Normal >60  Chronic Kidney Disease <60  Kidney Failure <15    The GFR formula is only valid for adults with stable renal function between ages 18 and 70.    Lipase [[id number]]  (Abnormal) Collected: 08/11/23 2029    Specimen: Blood Updated: 08/11/23 2057      Lipase 12 U/L     CBC & Differential [[id number]]  (Abnormal) Collected: 08/11/23 2029    Specimen: Blood Updated: 08/11/23 2038    Narrative:      The following orders were created for panel order CBC & Differential.  Procedure                               Abnormality         Status                     ---------                               -----------         ------                     CBC Auto Differential[[id number]]        Abnormal            Final result                 Please view results for these tests on the individual orders.    CBC Auto Differential [[id number]]  (Abnormal) Collected: 08/11/23 2029    Specimen: Blood Updated: 08/11/23 2038     WBC 22.49 10*3/mm3      RBC 4.13 10*6/mm3      Hemoglobin 12.0 g/dL      Hematocrit 36.9 %      MCV 89.3 fL      MCH 29.1 pg      MCHC 32.5 g/dL      RDW 12.9 %      RDW-SD 41.8 fl      MPV 9.4 fL      Platelets 279 10*3/mm3      Neutrophil % 93.6 %      Lymphocyte % 1.4 %      Monocyte % 4.0 %      Eosinophil % 0.0 %      Basophil % 0.2 %      Immature Grans % 0.8 %      Neutrophils, Absolute 21.07 10*3/mm3      Lymphocytes, Absolute 0.31 10*3/mm3      Monocytes, Absolute 0.89 10*3/mm3      Eosinophils, Absolute 0.00 10*3/mm3      Basophils, Absolute 0.05 10*3/mm3      Immature Grans, Absolute 0.17 10*3/mm3      nRBC 0.0 /100 WBC           Imaging Results (Last 24 Hours)       Procedure Component Value Units Date/Time    CT Abdomen Pelvis With Contrast [[id number]] Collected: 08/11/23 2232     Updated: 08/11/23 2232    Narrative:        Patient: DONELL MODI  Time Out: 22:32  Exam(s): CT ABDOMEN + PELVIS With Contrast     EXAM:    CT Abdomen and Pelvis With Intravenous Contrast    CLINICAL HISTORY:    Reason for exam: abd pain, diarrhea, leukocytosis.    TECHNIQUE:    Axial computed tomography images of the abdomen and pelvis with   intravenous contrast.  CTDI is 5.79 mGy and DLP is 226.5 mGy-cm.  This CT   exam was performed according to the principle of KOBE  (As Low As   Reasonably Achievable) by using one or more of the following dose   reduction techniques: automated exposure control, adjustment of the mA   and or kV according to patient size, and or use of iterative   reconstruction technique.    COMPARISON:    No relevant prior studies available.    FINDINGS:    Lung bases:  Unremarkable.  No mass.  No consolidation.    Pleural space:  Left pleural effusion.     ABDOMEN:    Liver:  Unremarkable.  No mass.    Gallbladder and bile ducts:  Unremarkable.  No calcified stones.  No   ductal dilation.    Pancreas:  Unremarkable.  No mass.  No ductal dilation.    Spleen:  Unremarkable.  No splenomegaly.    Adrenals:  Unremarkable.  No mass.    Kidneys and ureters:  Simple 2.7 cm left renal cyst.  No follow-up of   this simple cyst is necessary.  No hydronephrosis.    Stomach and bowel:  Postoperative changes of the stomach.    Diverticulosis without evidence of diverticulitis.  No obstruction.     PELVIS:    Appendix:  No findings to suggest acute appendicitis.    Bladder:  Unremarkable.  No mass.    Reproductive:  Unremarkable as visualized.     ABDOMEN and PELVIS:    Intraperitoneal space:  Unremarkable.  No free air.  No significant   fluid collection.    Bones joints:  L1 and T11 vertebral body compression fracture resulting   in 80 and 60% vertebral body height loss prospectively.  Streak artifact   from the patient's bilateral hip arthroplasties limits evaluation.  No   dislocation.    Soft tissues:  Unremarkable.    Vasculature:  Unremarkable.  No abdominal aortic aneurysm.    Lymph nodes:  Unremarkable.  No enlarged lymph nodes.    IMPRESSION:         Left pleural effusion.      Impression:          Electronically signed by Angelito Colon MD on 08-11-23 at 2232            Current Facility-Administered Medications:     amLODIPine (NORVASC) tablet 10 mg, 10 mg, Oral, Daily, Nimesh Smith MD    famotidine (PEPCID) injection 20 mg, 20 mg, Intravenous, Q12H, Luis  MD Genesis, 20 mg at 08/12/23 0924    hydrALAZINE (APRESOLINE) injection 10 mg, 10 mg, Intravenous, Q4H PRN, Genesis Smith MD    HYDROmorphone (DILAUDID) injection 0.5 mg, 0.5 mg, Intravenous, Q4H PRN, Genesis Smith MD, 0.5 mg at 08/12/23 0951    ondansetron (ZOFRAN) injection 4 mg, 4 mg, Intravenous, Q6H PRN, Genesis Smith MD, 4 mg at 08/12/23 0650    piperacillin-tazobactam (ZOSYN) 3.375 g in iso-osmotic dextrose 50 ml (premix), 3.375 g, Intravenous, Q8H, Genesis Smith MD    sodium bicarbonate 150 mEq/1000 mL D5W infusion, 150 mEq, Intravenous, Continuous, Bonita Patel MD    [COMPLETED] Insert Peripheral IV, , , Once **AND** sodium chloride 0.9 % flush 10 mL, 10 mL, Intravenous, PRN, Godfrey Arrieta MD     ASSESSMENT  Acute kidney injury secondary to volume depletion  Abdominal pain with nausea vomiting diarrhea  Problem ischemic bowel  Lactic acidosis  Peripheral artery disease  History of pancreatic cancer    PLAN  Admit  IVF  IV antibiotics  Symptomatic treatment for abdominal pain nausea vomiting  Check GI panel and C. difficile toxin  GI consult  Nephrology to follow patient  Adjust home medications  Stress ulcer DVT prophylaxis  Supportive care  DNR  Discussed with family nursing staff  Follow closely further recommendation current hospital course    GENESIS SMITH MD

## 2023-08-12 NOTE — CONSULTS
"Nutrition Services    Patient Name:  Lavern Mcduffie  YOB: 1945  MRN: [id number]  Admit Date:  8/11/2023    Assessment Date:  08/12/23    Comment: Nutrition following for nurse admission screen consult. Patient admitted for vomiting and diarrhea. Hx of pancreatic cancer, s/p resection last year. 4# (4%) wt loss x 5 months. Currently on clear liquid diet. Palliative consult noted. Will continue to follow and monitor for GOC.       CLINICAL NUTRITION ASSESSMENT      Reason for Assessment Nurse Admission Screen     Diagnosis/Problem   Vomiting and diarrhea   Medical/Surgical History Past Medical History:   Diagnosis Date    Arthritis     Hyperlipidemia     Hypertension     Right hip pain        Past Surgical History:   Procedure Laterality Date    COLONOSCOPY      INGUINAL HERNIA REPAIR Left     TONSILLECTOMY      TOTAL HIP ARTHROPLASTY Left     TOTAL HIP ARTHROPLASTY Right 2/24/2021    Procedure: RIGHT TOTAL HIP ARTHROPLASTY ANTERIOR WITH HANA TABLE;  Surgeon: Sven Rouse MD;  Location: [facility];  Service: Orthopedics;  Laterality: Right;        Encounter Information        Nutrition History:     Food Preferences:    Supplements:    Factors Affecting Intake: altered GI function, diarrhea, vomiting     Anthropometrics        Current Height  Current Weight  BMI kg/m2 Height: 152.4 cm (60\")  Weight: 44 kg (97 lb) (08/11/23 2017)  Body mass index is 18.94 kg/mý.   Adjusted BMI (if applicable)    BMI Category Normal/Healthy (18.4 - 24.9)       Admission Weight 97# (44 kg)        Ideal Body Weight (IBW) 111# (50.4 kg)    Adjusted IBW (if applicable)        Usual Body Weight (UBW)    Weight Change/Trend Loss, Amount/Timeframe: 4# (4%) wt loss x 5 months       Weight History Wt Readings from Last 30 Encounters:   08/11/23 2017 44 kg (97 lb)   07/07/23 0944 44 kg (97 lb)   02/24/21 0601 52.7 kg (116 lb 3 oz)   02/22/21 0750 53.5 kg (118 lb)   02/22/21 0822 53.5 kg (118 lb)   02/22/21 1413 52.2 kg " (115 lb)   02/05/21 1253 53.7 kg (118 lb 6.4 oz)           --  Estimated/Assessed Needs        Current Weight  Weight: 44 kg (97 lb) (08/11/23 2017)       Energy Requirements    Weight for Calculation 97# (44 kg)    Method for Estimation  25 kcal/kg, 30 kcal/kg   EST Needs (kcal/day) 7248-9682       Protein Requirements    Weight for Calculation 97# (44 kg)    EST Protein Needs (g/kg) 1.2 - 1.5 gm/kg   EST Daily Needs (g/day) 53-66       Fluid Requirements     Method for Estimation 1 mL/kcal    EST Needs (mL/day)      Tests/Procedures        Tests/Procedures CT scan     Labs       Pertinent Labs    Results from last 7 days   Lab Units 08/11/23 2029   SODIUM mmol/L 134*   POTASSIUM mmol/L 3.2*   CHLORIDE mmol/L 100   CO2 mmol/L 11.4*   BUN mg/dL 32*   CREATININE mg/dL 1.45*   CALCIUM mg/dL 7.0*   BILIRUBIN mg/dL 0.4   ALK PHOS U/L 186*   ALT (SGPT) U/L 22   AST (SGOT) U/L 44*   GLUCOSE mg/dL 88     Results from last 7 days   Lab Units 08/12/23  0637 08/11/23 2029   HEMOGLOBIN g/dL 12.4 12.0   HEMATOCRIT % 37.7 36.9   WBC 10*3/mm3 24.05* 22.49*   ALBUMIN g/dL  --  2.0*     Results from last 7 days   Lab Units 08/12/23  0637 08/11/23 2029   PLATELETS 10*3/mm3 284 279     SARS-CoV-2 PCR   Date Value Ref Range Status   02/22/2021 Not Detected Not Detected Final     Comment:     Nucleic acid specific to SARS-CoV-2 (COVID-19) virus was not detected in  this sample by the Aptima (R) SARS-CoV-2 Assay.                SARS-CoV-2 (COVID-19) nucleic acid testing performed using [facility] Aptima (R) SARS-CoV-2 Assay or [employer] TaqPath (TM)  COVID-19 Combo Kit as indicated above under Emergency Use Authorization (EUA) from the FDA. Aptima (R) and TaqPath (TM) test performance  characteristics verified by [employer] in accordance with the FDAs Guidance Document (Policy for Diagnostic Tests for Coronavirus Disease-2019  during the Public Health Emergency) issued on March 16, 2020. The laboratory is regulated  under CLIA as qualified to perform high-complexity testing  Unless otherwise noted, all testing was performed at [facility], CLIA No. [id number], [place] State Licensee No. [id number]     Lab Results   Component Value Date    HGBA1C 4.2 (L) 08/22/2022          Medications           Scheduled Medications amLODIPine, 10 mg, Oral, Daily  famotidine, 20 mg, Intravenous, Q12H  piperacillin-tazobactam, 3.375 g, Intravenous, Q8H       Infusions sodium bicarbonate, 150 mEq       PRN Medications   hydrALAZINE    HYDROmorphone    ondansetron    [COMPLETED] Insert Peripheral IV **AND** sodium chloride     Physical Findings          Physical Appearance alert, oriented   Oral/Mouth Cavity tooth or teeth missing   Edema  no edema   Gastrointestinal normoactive, last bowel movement: 8/11   Skin  bruising   Tubes/Drains/Lines none   NFPE Pending, due to: did not visit    --  Current Nutrition Orders & Evaluation of Intake       Oral Nutrition     Food Allergies NKFA   Current PO Diet Diet: Liquid Diets; Clear Liquid; Texture: Regular Texture (IDDSI 7); Fluid Consistency: Thin (IDDSI 0)   Supplement n/a   PO Evaluation     % PO Intake N/a     # of Days Evaluated    --  PES STATEMENT / NUTRITION DIAGNOSIS      Nutrition Dx Problem  Problem: Altered GI Function  Etiology: Medical Diagnosis diarrhea and vomiting   Signs/Symptoms: Clear Liquid Diet and Unintended Weight Change    Comment:    --  NUTRITION INTERVENTION / PLAN OF CARE      Intervention Goal(s) Reduce/improve symptoms, Meet estimated needs, Disease management/therapy, Tolerate PO , Increase intake, Advance diet, and Appropriate weight gain         RD Intervention/Action Follow Tx Progress and Care plan reviewed         Prescription/Orders:       PO Diet       Supplements       Snacks       Enteral Nutrition       Parenteral Nutrition    New Prescription Ordered? No changes at this time   --      Monitor/Evaluation Per protocol, I&O, PO intake, Pertinent labs, Weight, Skin  status, GI status, Symptoms, POC/GOC   Discharge Plan/Needs Pending clinical course   Education Will instruct as appropriate   --    RD to follow per protocol.      Electronically signed by:  Fadia Loredo RD  08/12/23 13:25 EDT

## 2023-08-12 NOTE — CONSULTS
[facility]   Consult Note    Patient Name: Lavern Mcduffie  : 1945  MRN: [id number]  Primary Care Physician:  Ellen Hayes MD  Referring Physician: No ref. provider found  Date of admission: 2023    Inpatient Gastroenterology Consult  Consult performed by: Dominik Arnold MD  Consult ordered by: Nimesh Smith MD      Subjective   Subjective     Reason for Consult/ Chief Complaint: abdominal pain, diarrhea weight loss     History of Present Illness  Lavern Mcduffie is a 77 y.o. female  presents with diarrhea, poor appetite and weight loss.  She has had cholangiocarcinoma  and had a pancreatic resection  and had resection last year.  She has had diarrhea and has been eating less.  After presentation she has had iv fluids and antibiotics and is feeling better. I did talk to her son who has helped confirm history  she states that her pancreatic resection for cancer was a success. She is feeling better.     Review of Systems   Constitutional:  Positive for appetite change and unexpected weight change.   Gastrointestinal:  Positive for diarrhea and nausea.   Neurological:  Positive for weakness.      Personal History     Past Medical History:   Diagnosis Date    Arthritis     Hyperlipidemia     Hypertension     Right hip pain        Past Surgical History:   Procedure Laterality Date    COLONOSCOPY      INGUINAL HERNIA REPAIR Left     TONSILLECTOMY      TOTAL HIP ARTHROPLASTY Left     TOTAL HIP ARTHROPLASTY Right 2021    Procedure: RIGHT TOTAL HIP ARTHROPLASTY ANTERIOR WITH HANA TABLE;  Surgeon: Sven Rouse MD;  Location: [facility]  Service: Orthopedics;  Laterality: Right;       Family History: family history includes Heart attack in her father; Heart disease in her father. Otherwise pertinent FHx was reviewed and not pertinent to current issue.    Social History:  reports that she quit smoking about 7 years ago. Her smoking use included cigarettes. She has a 15.00 pack-year  smoking history. She has never used smokeless tobacco. She reports current alcohol use. She reports that she does not use drugs.    Home Medications:   Vasculera, amoxicillin, lidocaine, losartan, and lovastatin    Allergies:  No Known Allergies    Objective    Objective     Vitals:  Temp:  [97.2 øF (36.2 øC)-98.4 øF (36.9 øC)] 98.4 øF (36.9 øC)  Heart Rate:  [] 117  Resp:  [16-18] 16  BP: (101-152)/() 110/70    Physical Exam  Constitutional:       Appearance: She is cachectic.   HENT:      Right Ear: External ear normal.      Left Ear: External ear normal.      Mouth/Throat:      Pharynx: Oropharynx is clear.   Eyes:      Conjunctiva/sclera: Conjunctivae normal.   Cardiovascular:      Rate and Rhythm: Normal rate.      Pulses: Normal pulses.   Pulmonary:      Effort: Pulmonary effort is normal.   Abdominal:      General: Abdomen is flat.   Skin:     General: Skin is warm and dry.   Neurological:      General: No focal deficit present.   Psychiatric:         Mood and Affect: Mood normal.       Result Review    Result Review:  I have personally reviewed the results from the time of this admission to 8/12/2023 19:57 EDT and agree with these findings:  []  Laboratory list / accordion  []  Microbiology  []  Radiology  []  EKG/Telemetry   []  Cardiology/Vascular   []  Pathology  []  Old records  []  Other:  Most notable findings include: reviewed ct with Dr Canales,  possible sma stenosis  also reviewed with Dr Dashawn Smalls       Assessment & Plan   Assessment / Plan     Brief Patient Summary:  Lavern Mcduffie is a 77 y.o. female who   Nausea  Diarrhea consider infection, pancreatic insufficiency  bacterial overgrowth given surgical history   Weight loss  S/p pancreatic and duodenal resection  Possible chronic mesenteric ischemia- does not appear to be an acute presentation possible chronic ischemia     Active Hospital Problems:  Active Hospital Problems    Diagnosis     **Vomiting and diarrhea      Plan: agree  with Iv fluids and antibiotics  Will add sucralfate,   Agree with stool studies, adding lactoferrin and pancreatic elastase   Plan mesenteric doppler tomorrow.  May be element of nausea from right heart failure as well.  Consider echo       Dominik Arnold MD

## 2023-08-13 PROBLEM — E87.6 HYPOKALEMIA: Status: ACTIVE | Noted: 2023-01-01

## 2023-08-13 PROBLEM — E83.42 HYPOMAGNESEMIA: Status: ACTIVE | Noted: 2023-01-01

## 2023-08-13 NOTE — PROGRESS NOTES
"   LOS: 0 days     Chief Complaint/ Reason for encounter: JUANI, acidosis and electrolyte abnormalities    Subjective   08/13/23 : She feels about the same today, having some diarrhea, still very weak  Denies shortness of breath or chest pain  No fevers or chills  Oral intake low but no nausea or vomiting      Medical history reviewed:  History of Present Illness    Subjective    History taken from: Patient and chart    Vital Signs  Temp:  [97.2 øF (36.2 øC)-98.4 øF (36.9 øC)] 98.4 øF (36.9 øC)  Heart Rate:  [] 97  Resp:  [16] 16  BP: (106-122)/(57-71) 121/65       Wt Readings from Last 1 Encounters:   08/11/23 2017 44 kg (97 lb)       Objective:  Vital signs: (most recent): Blood pressure 121/65, pulse 97, temperature 98.4 øF (36.9 øC), temperature source Oral, resp. rate 16, height 152.4 cm (60\"), weight 44 kg (97 lb), SpO2 (!) 89 %.              Objective:  General Appearance:  Comfortable, thin, somewhat cachectic, chronically ill-appearing, in no acute distress and not in pain.  Awake, alert, oriented  HEENT: Mucous membranes moist, no injury, oropharynx clear  Lungs:  Normal effort and normal respiratory rate.  Breath sounds clear to auscultation.  No  respiratory distress.  No rales, decreased breath sounds or rhonchi.    Heart: Normal rate.  Regular rhythm.  S1, S2 normal.  No murmur.   Abdomen: Abdomen is soft.  Bowel sounds are normal, no abdominal tenderness.  There is no rebound or guarding  Extremities: No significant edema of bilateral lower extremities, Thin, cachectic extremities  Neurological: No focal motor or sensory deficits, pupils reactive  Skin:  Warm and dry.  No rash or cyanosis.       Results Review:    Intake/Output:     Intake/Output Summary (Last 24 hours) at 8/13/2023 1037  Last data filed at 8/12/2023 2000  Gross per 24 hour   Intake 150 ml   Output --   Net 150 ml         DATA:  Radiology and Labs:  The following labs independently reviewed by me. Additional labs ordered for " tomorrow a.m.  Interval notes, chart personally reviewed by me.   Old records independently reviewed showing baseline creatinine looks to be around 0.6 though creatinine was 1.2 about a month ago  The following radiologic studies independently viewed by me, findings CT abdomen and pelvis with contrast showed a simple left renal cyst with no need for follow-up, no hydronephrosis  New problems include severe hypokalemia, hypomagnesemia, hypocalcemia  Discussed with patient and her nurse at bedside    Risk/ complexity of medical care/ medical decision making high complexity, severe electrolyte abnormalities    Labs:   Recent Results (from the past 24 hour(s))   Comprehensive Metabolic Panel    Collection Time: 08/12/23  4:25 PM    Specimen: Blood   Result Value Ref Range    Glucose 131 (H) 65 - 99 mg/dL    BUN 31 (H) 8 - 23 mg/dL    Creatinine 1.53 (H) 0.57 - 1.00 mg/dL    Sodium 144 136 - 145 mmol/L    Potassium 3.3 (L) 3.5 - 5.2 mmol/L    Chloride 112 (H) 98 - 107 mmol/L    CO2 14.0 (L) 22.0 - 29.0 mmol/L    Calcium 6.5 (L) 8.6 - 10.5 mg/dL    Total Protein 5.0 (L) 6.0 - 8.5 g/dL    Albumin 2.0 (L) 3.5 - 5.2 g/dL    ALT (SGPT) 22 1 - 33 U/L    AST (SGOT) 37 (H) 1 - 32 U/L    Alkaline Phosphatase 180 (H) 39 - 117 U/L    Total Bilirubin 0.2 0.0 - 1.2 mg/dL    Globulin 3.0 gm/dL    A/G Ratio 0.7 g/dL    BUN/Creatinine Ratio 20.3 7.0 - 25.0    Anion Gap 18.0 (H) 5.0 - 15.0 mmol/L    eGFR 34.5 (L) >60.0 mL/min/1.73   STAT Lactic Acid, Reflex    Collection Time: 08/12/23  4:25 PM    Specimen: Blood   Result Value Ref Range    Lactate 1.7 0.5 - 2.0 mmol/L   BNP    Collection Time: 08/12/23  4:25 PM    Specimen: Blood   Result Value Ref Range    proBNP 11,574.0 (H) 0.0 - 1,800.0 pg/mL   Cortisol    Collection Time: 08/12/23  4:25 PM    Specimen: Blood   Result Value Ref Range    Cortisol 104.00   mcg/dL   TSH    Collection Time: 08/13/23  4:39 AM    Specimen: Blood   Result Value Ref Range    TSH     Lipid Panel     Collection Time: 08/13/23  4:39 AM    Specimen: Blood   Result Value Ref Range    Total Cholesterol      Triglycerides      HDL Cholesterol      LDL Cholesterol       VLDL Cholesterol      LDL/HDL Ratio     Hemoglobin A1c    Collection Time: 08/13/23  4:39 AM    Specimen: Blood   Result Value Ref Range    Hemoglobin A1C 5.10 4.80 - 5.60 %   Phosphorus    Collection Time: 08/13/23  4:39 AM    Specimen: Blood   Result Value Ref Range    Phosphorus     CBC Auto Differential    Collection Time: 08/13/23  4:39 AM    Specimen: Blood   Result Value Ref Range    WBC 25.98 (H) 3.40 - 10.80 10*3/mm3    RBC 3.57 (L) 3.77 - 5.28 10*6/mm3    Hemoglobin 10.6 (L) 12.0 - 15.9 g/dL    Hematocrit 31.1 (L) 34.0 - 46.6 %    MCV 87.1 79.0 - 97.0 fL    MCH 29.7 26.6 - 33.0 pg    MCHC 34.1 31.5 - 35.7 g/dL    RDW 12.8 12.3 - 15.4 %    RDW-SD 41.0 37.0 - 54.0 fl    MPV 9.2 6.0 - 12.0 fL    Platelets 231 140 - 450 10*3/mm3    nRBC 0.0 0.0 - 0.2 /100 WBC   Manual Differential    Collection Time: 08/13/23  4:39 AM    Specimen: Blood   Result Value Ref Range    Neutrophil % 96.8 (H) 42.7 - 76.0 %    Lymphocyte % 0.0 (L) 19.6 - 45.3 %    Monocyte % 3.2 (L) 5.0 - 12.0 %    Neutrophils Absolute 25.15 (H) 1.70 - 7.00 10*3/mm3    Lymphocytes Absolute 0.00 (L) 0.70 - 3.10 10*3/mm3    Monocytes Absolute 0.83 0.10 - 0.90 10*3/mm3    Ovalocytes Slight/1+ None Seen    Poikilocytes Slight/1+ None Seen    Polychromasia Slight/1+ None Seen    WBC Morphology Normal Normal    Platelet Morphology Normal Normal   Magnesium    Collection Time: 08/13/23  5:32 AM    Specimen: Blood   Result Value Ref Range    Magnesium 1.4 (L) 1.6 - 2.4 mg/dL   Comprehensive Metabolic Panel    Collection Time: 08/13/23  5:32 AM    Specimen: Blood   Result Value Ref Range    Glucose 238 (H) 65 - 99 mg/dL    BUN 27 (H) 8 - 23 mg/dL    Creatinine 1.21 (H) 0.57 - 1.00 mg/dL    Sodium 143 136 - 145 mmol/L    Potassium 2.2 (C) 3.5 - 5.2 mmol/L    Chloride 107 98 - 107 mmol/L    CO2  27.0 22.0 - 29.0 mmol/L    Calcium 6.2 (L) 8.6 - 10.5 mg/dL    Total Protein 4.4 (L) 6.0 - 8.5 g/dL    Albumin 1.7 (L) 3.5 - 5.2 g/dL    ALT (SGPT) 19 1 - 33 U/L    AST (SGOT) 29 1 - 32 U/L    Alkaline Phosphatase 157 (H) 39 - 117 U/L    Total Bilirubin 0.3 0.0 - 1.2 mg/dL    Globulin 2.7 gm/dL    A/G Ratio 0.6 g/dL    BUN/Creatinine Ratio 22.3 7.0 - 25.0    Anion Gap 9.0 5.0 - 15.0 mmol/L    eGFR 45.7 (L) >60.0 mL/min/1.73   ECG 12 Lead Rhythm Change    Collection Time: 08/13/23  8:55 AM   Result Value Ref Range    QT Interval 393 ms       Radiology:  Pertinent radiology studies were reviewed as described above      Medications have been reviewed separately in chart overview      ASSESSMENT:  ARF, prerenal and improving.  Creatinine was 1.2 in July of this year but was normal prior to that.  We will continue to trend labs  Hypokalemia, worse today, in part due to bicarb drip  Metabolic acidosis, largely resolved with bicarb drip  Vomiting with diarrhea, improved  HTN  New hypomagnesemia  New hypocalcemia, corrects to near normal with low albumin.  We will check ionized calcium    Vomiting and diarrhea           PLAN:   Arf due to volume depletion continues to improve, creatinine near prior baseline  Continue on IV fluids as oral intake remains low and she continues to have diarrhea  Replace potassium orally and IV and recheck a level later today  Further replacement per oral replacement protocol  Will continue to leave off arb at this time   On norvasc for HTN      Monitor electrolytes and volume closely   Limit IV dye, NSAIDS and nephrotoxic medications   Please call with any questions or concerns.     Isaac Patel MD  [facility]   Office phone number: [phone]  Answering service phone number: [phone]    08/13/23  10:37 EDT    Dictation performed using Dragon dictation software

## 2023-08-13 NOTE — CONSULTS
Patient Name: Lavern Mcduffie  :1945  77 y.o.    Date of Admission: 2023  Encounter Provider: Chantale Watson MD  Date of Encounter Visit: 23  Place of Service: [facility] CARDIOLOGY  Referring Provider: No ref. provider found  Patient Care Team:  Ellen Hayes MD as PCP - General (Internal Medicine)      Chief complaint: Abdominal Pain, Nausea and Vomiting      History of Present Illness:     Lavern Mcduffie is a 76 yo patient followed by Dr. Rees.  She has hypertension, hyperlipidemia, peripheral vascular disease and history of pancreatic cancer.  She came to the emergency room on  with abdominal pain, nausea and vomiting.  She was admitted with acute kidney injury.  proBNP was over 11,000.  Potassium 2.2.  Creatinine 1.21.  She is on IV fluids.  She had a very short bursts of nonsustained SVT yesterday which is asymptomatic.  She denies chest pain, shortness of breath or palpitations.      ECHO 21  Estimated right ventricular systolic pressure from tricuspid regurgitation is mildly elevated (35-45 mmHg).  Calculated left ventricular EF = 66.2% Estimated left ventricular EF was in agreement with the calculated left ventricular EF.  Left ventricular diastolic function was normal.  Calculated left ventricular EF = 66.2%  There is no evidence of pericardial effusion. .    STRESS TEST 21  Findings consistent with a normal ECG stress test.  Left ventricular ejection fraction is normal. (Calculated EF = 60%).  Myocardial perfusion imaging indicates a normal myocardial perfusion study with no evidence of ischemia.  Impressions are consistent with a low risk study.    Past Medical History:   Diagnosis Date    Arthritis     Hyperlipidemia     Hypertension     Right hip pain        Past Surgical History:   Procedure Laterality Date    COLONOSCOPY      INGUINAL HERNIA REPAIR Left     TONSILLECTOMY      TOTAL HIP ARTHROPLASTY Left     TOTAL HIP  ARTHROPLASTY Right 2021    Procedure: RIGHT TOTAL HIP ARTHROPLASTY ANTERIOR WITH HANA TABLE;  Surgeon: Sven Rouse MD;  Location: [facility] OR;  Service: Orthopedics;  Laterality: Right;         Prior to Admission medications    Medication Sig Start Date End Date Taking? Authorizing Provider   Dietary Management Product (Vasculera) tablet Take  by mouth.   Yes Zack Mejía MD   amoxicillin (AMOXIL) 500 MG capsule Take 4 capsules by mouth Take As Directed. PRIOR TO DENTAL APPT 21   Zack Mejía MD   lidocaine (LIDODERM) 5 % Place 1 patch on the skin as directed by provider Daily. Remove & Discard patch within 12 hours or as directed by MD 23   Fransisco Arenas MD   losartan (COZAAR) 100 MG tablet Take 1 tablet by mouth Daily. 21   Zack Mejía MD   lovastatin (MEVACOR) 40 MG tablet Take 1 tablet by mouth Daily. 21   Zack Mejía MD       No Known Allergies    Social History     Socioeconomic History    Marital status:    Tobacco Use    Smoking status: Former     Packs/day: 0.50     Years: 30.00     Pack years: 15.00     Types: Cigarettes     Quit date:      Years since quittin.6    Smokeless tobacco: Never   Vaping Use    Vaping Use: Never used   Substance and Sexual Activity    Alcohol use: Yes     Comment: SOCIALLY    Drug use: Never    Sexual activity: Defer     Birth control/protection: Post-menopausal       Family History   Problem Relation Age of Onset    Heart attack Father     Heart disease Father     Malig Hyperthermia Neg Hx        REVIEW OF SYSTEMS:   All other systems reviewed and negative.        Objective:     Vitals:    23 1215 23 1927 23 2318 23 0730   BP: 106/57 110/70 122/71 121/65   BP Location: Right arm Right arm Right arm Right arm   Patient Position: Lying Lying Lying Lying   Pulse: 100 117 100 97   Resp: 16 16 16 16   Temp: 97.2 øF (36.2 øC) 98.4 øF (36.9 øC) 98.4 øF (36.9 øC) 98.4 øF (36.9  øC)   TempSrc: Oral Oral Oral Oral   SpO2: 96% (!) 89% 91% (!) 89%   Weight:       Height:         Body mass index is 18.94 kg/mý.    Intake/Output Summary (Last 24 hours) at 8/13/2023 1113  Last data filed at 8/12/2023 2000  Gross per 24 hour   Intake 150 ml   Output --   Net 150 ml     Constitutional: She is oriented to person, place, and time. She appears well-developed. She does not appear ill.   HENT:   Head: Normocephalic and atraumatic. Head is without contusion.   Right Ear: Hearing normal. No drainage.   Left Ear: Hearing normal. No drainage.   Nose: No nasal deformity. No epistaxis.   Eyes: Lids are normal. Right eye exhibits no exudate. Left eye exhibits no exudate.  Neck: No JVD present. Carotid bruit is not present. No tracheal deviation present. No thyroid mass and no thyromegaly present.   Cardiovascular: Normal rate, regular rhythm and normal heart sounds.    Pulses:       Posterior tibial pulses are 2+ on the right side, and 2+ on the left side.   Pulmonary/Chest: Effort normal and breath sounds normal.   Abdominal: Soft. Normal appearance and bowel sounds are normal. There is no tenderness.   Musculoskeletal: Normal range of motion.        Right shoulder: She exhibits no deformity.        Left shoulder: She exhibits no deformity.   Neurological: She is alert and oriented to person, place, and time. She has normal strength.   Skin: Skin is warm, dry and intact. No rash noted.   Psychiatric: She has a normal mood and affect. Her behavior is normal. Thought content normal.   Vitals reviewed        Lab Review:     Results from last 7 days   Lab Units 08/13/23  0532   SODIUM mmol/L 143   POTASSIUM mmol/L 2.2*   CHLORIDE mmol/L 107   CO2 mmol/L 27.0   BUN mg/dL 27*   CREATININE mg/dL 1.21*   CALCIUM mg/dL 6.2*   BILIRUBIN mg/dL 0.3   ALK PHOS U/L 157*   ALT (SGPT) U/L 19   AST (SGOT) U/L 29   GLUCOSE mg/dL 238*         Results from last 7 days   Lab Units 08/13/23  0439   WBC 10*3/mm3 25.98*   HEMOGLOBIN  g/dL 10.6*   HEMATOCRIT % 31.1*   PLATELETS 10*3/mm3 231         Results from last 7 days   Lab Units 08/13/23  0532   MAGNESIUM mg/dL 1.4*             8-13-23 2-5-21      I personally viewed and interpreted the patient's EKG/Telemetry data.        Assessment and Plan:       1.  Short burst of SVT.  Benign.  2.  Severe hypokalemia.  Replace.  3.  Markedly elevated BNP.  Last echo 2021 with normal LV function and normal diastolic function without valve disease.  4.  Hypertension  5.  Hyperlipidemia  6.  Peripheral vascular disease  7.  Pancreatic cancer status post resection a year ago.    Check echocardiogram.    Cahntale Watson MD  08/13/23  11:13 EDT

## 2023-08-13 NOTE — PROGRESS NOTES
LOS: 0 days   Patient Care Team:  Ellen Hayes MD as PCP - General (Internal Medicine)      Subjective     Interval History: S/p mesenteric Doppler, results pending. She had some V. tach overnight.    Subjective: Patient states she feels better today.  She has good appetite and is tolerating liquid diet.        ROS:   Review of Systems   All other systems reviewed and are negative.       Medication Review:     Current Facility-Administered Medications:     amLODIPine (NORVASC) tablet 10 mg, 10 mg, Oral, Daily, Nimesh Smith MD, 10 mg at 08/13/23 0838    aspirin chewable tablet 81 mg, 81 mg, Oral, Daily, Nimesh Smith MD, 81 mg at 08/13/23 0838    Calcium Replacement - Follow Nurse / BPA Driven Protocol, , Does not apply, Amanda ERWIN Andrew James, MD    famotidine (PEPCID) injection 20 mg, 20 mg, Intravenous, Q12H, Nimesh Smith MD, 20 mg at 08/13/23 0837    hydrALAZINE (APRESOLINE) injection 10 mg, 10 mg, Intravenous, Q4H PRN, Nimesh Smith MD    HYDROmorphone (DILAUDID) injection 0.5 mg, 0.5 mg, Intravenous, Q4H PRN, Nimesh Smith MD, 0.5 mg at 08/12/23 0951    Magnesium Standard Dose Replacement - Follow Nurse / BPA Driven Protocol, , Does not apply, Amanda ERWIN Andrew James, MD    ondansetron (ZOFRAN) injection 4 mg, 4 mg, Intravenous, Q6H PRN, Nimesh Smith MD, 4 mg at 08/13/23 0439    Phosphorus Replacement - Follow Nurse / BPA Driven Protocol, , Does not apply, Amanda ERWIN Andrew James, MD    piperacillin-tazobactam (ZOSYN) 3.375 g in iso-osmotic dextrose 50 ml (premix), 3.375 g, Intravenous, Q8H, Nimesh Smith MD, Last Rate: 0 mL/hr at 08/12/23 2142, 3.375 g at 08/13/23 0559    Potassium Replacement - Follow Nurse / BPA Driven Protocol, , Does not apply, Amanda ERWIN Andrew James, MD    [COMPLETED] Insert Peripheral IV, , , Once **AND** sodium chloride 0.9 % flush 10 mL, 10 mL, Intravenous, PRN, Berny, Godfrey Thao MD, 10 mL at 08/13/23 0838    sodium chloride 0.9 % with KCl 20 mEq/L infusion, 125  mL/hr, Intravenous, Continuous, Isaac Patel MD, Last Rate: 125 mL/hr at 08/13/23 1038, 125 mL/hr at 08/13/23 1038    sucralfate (CARAFATE) tablet 1 g, 1 g, Oral, 4x Daily AC & at Bedtime, Dominik Arnold MD, 1 g at 08/13/23 1308      Objective     Vital Signs  Vitals:    08/12/23 1927 08/12/23 2318 08/13/23 0730 08/13/23 1353   BP: 110/70 122/71 121/65 90/49   BP Location: Right arm Right arm Right arm Right arm   Patient Position: Lying Lying Lying Lying   Pulse: 117 100 97 113   Resp: 16 16 16 16   Temp: 98.4 øF (36.9 øC) 98.4 øF (36.9 øC) 98.4 øF (36.9 øC) 98.9 øF (37.2 øC)   TempSrc: Oral Oral Oral Oral   SpO2: (!) 89% 91% (!) 89% 92%   Weight:       Height:           Physical Exam: Resting in bed, family present    General Appearance:    Awake and alert, in no acute distress, frail   Head:    Normocephalic, without obvious abnormality   Eyes:          Conjunctivae normal, anicteric sclera   Throat:   No oral lesions, no thrush, oral mucosa moist   Neck:   No adenopathy, supple, no JVD   Lungs:     Respirations regular, even and unlabored   Abdomen:     Soft, non-tender, non-distended, no rebound or guarding, no hepatosplenomegaly   Rectal:     Deferred   Extremities:   No edema, no cyanosis, moves equally bilaterally   Skin:   No bruising, no rash, no jaundice        Results Review:    CBC  Results from last 7 days   Lab Units 08/13/23  0439 08/12/23  0637 08/11/23 2029   RBC 10*6/mm3 3.57* 4.27 4.13   WBC 10*3/mm3 25.98* 24.05* 22.49*   HEMOGLOBIN g/dL 10.6* 12.4 12.0   PLATELETS 10*3/mm3 231 284 279       CMP  Results from last 7 days   Lab Units 08/13/23  0532 08/12/23  1625 08/11/23 2029   SODIUM mmol/L 143 144 134*   POTASSIUM mmol/L 2.2* 3.3* 3.2*   CHLORIDE mmol/L 107 112* 100   CO2 mmol/L 27.0 14.0* 11.4*   BUN mg/dL 27* 31* 32*   CREATININE mg/dL 1.21* 1.53* 1.45*   GLUCOSE mg/dL 238* 131* 88   ALBUMIN g/dL 1.7* 2.0* 2.0*   BILIRUBIN mg/dL 0.3 0.2 0.4   ALK PHOS U/L 157* 180* 186*   AST  (SGOT) U/L 29 37* 44*   ALT (SGPT) U/L 19 22 22       Amylase and Lipase  Results from last 7 days   Lab Units 08/11/23 2029   LIPASE U/L 12*       CRP         Imaging Results (Last 24 Hours)       ** No results found for the last 24 hours. **              ASSESSMENT:  77-year-old female with...  -Nausea  -Diarrhea - ddx infection versus EPI versus SIBO.  Lactoferrin positive.  C. difficile negative.  Cortisol normal.  -Weight loss  -S/p pancreatic and duodenal resection  -Possible chronic mesenteric ischemia      PLAN:  Results of mesenteric Doppler pending.  Continue Pepcid twice daily, sucralfate, IV fluids, antibiotics.  Continue Zofran as needed.  Advance to full liquid diet.  Consider eventual colonoscopy given positive lactoferrin.  BGA will follow.         Kelly Barry, KAIN  08/13/23  14:38 EDT

## 2023-08-13 NOTE — PLAN OF CARE
Goal Outcome Evaluation:         Pt resting in bed Aox4, Room air , ST/SR on tele . No sign of acute distress noted . C/O nausea medicated per order . IVF infusing . Will continue to monitor.

## 2023-08-13 NOTE — PROGRESS NOTES
"Daily progress note    Primary care physician      Chief complaint  Awake and alert and feeling better this morning with no specific complaints and family at bedside    History of present illness  77-year-old white female with history of pancreatic cancer hypertension hyperlipidemia and peripheral vascular disease brought to the emergency room by the family with severe abdominal pain for last 1 week that she is not eating drinking.  Patient also complained of nausea vomiting after eating and has couple of loose stools.  Patient denies any fever chills black stools blood in the stools.  Patient afraid of eating as it hurts more when she eats.  Patient work-up in ER revealed acute kidney injury and probably has ischemic bowel admitted for management.  Patient is DNR per her wishes.     REVIEW OF SYSTEMS  Review of all 14 systems is negative other than stated in the HPI above.     PHYSICAL EXAM  Blood pressure 90/49, pulse 113, temperature 98.9 øF (37.2 øC), temperature source Oral, resp. rate 16, height 152.4 cm (60\"), weight 44 kg (97 lb), SpO2 92 %.    GENERAL: Awake and alert, chronically ill-appearing, cachectic, no acute distress  HENT: nares patent, mucous membranes dry  EYES: no scleral icterus, EOMI  CV: regular rhythm, normal rate, port present right anterior chest wall  RESPIRATORY: normal effort  ABDOMEN: soft,, nondistended, moderate tenderness bowel sounds positive  MUSCULOSKELETAL: no deformity  NEURO: alert, moves all extremities, follows commands, cranial nerves II through XII intact, speech fluent and clear  PSYCH:  calm, cooperative  SKIN: warm, dry     LAB RESULTS  Lab Results (last 24 hours)       Procedure Component Value Units Date/Time    Calcium, Ionized [[id number]]  (Abnormal) Collected: 08/13/23 1213    Specimen: Blood Updated: 08/13/23 1249     Ionized Calcium 0.89 mmol/L      Ionized Calcium 3.6 mg/dL     Gastrointestinal Panel, PCR - Stool, Per Rectum [[id number]]  (Abnormal) " Collected: 08/13/23 0957    Specimen: Stool from Per Rectum Updated: 08/13/23 1156     Campylobacter Not Detected     Plesiomonas shigelloides Not Detected     Salmonella Not Detected     Vibrio Not Detected     Vibrio cholerae Not Detected     Yersinia enterocolitica Not Detected     Enteroaggregative E. coli (EAEC) Not Detected     Enteropathogenic E. coli (EPEC) Detected     Enterotoxigenic E. coli (ETEC) lt/st Not Detected     Shiga-like toxin-producing E. coli (STEC) stx1/stx2 Not Detected     Shigella/Enteroinvasive E. coli (EIEC) Not Detected     Cryptosporidium Not Detected     Cyclospora cayetanensis Not Detected     Entamoeba histolytica Not Detected     Giardia lamblia Not Detected     Adenovirus F40/41 Not Detected     Astrovirus Not Detected     Norovirus GI/GII Not Detected     Rotavirus A Not Detected     Sapovirus (I, II, IV or V) Not Detected    Clostridioides difficile Toxin - Stool, Per Rectum [[id number]]  (Normal) Collected: 08/13/23 0957    Specimen: Stool from Per Rectum Updated: 08/13/23 1125    Narrative:      The following orders were created for panel order Clostridioides difficile Toxin - Stool, Per Rectum.  Procedure                               Abnormality         Status                     ---------                               -----------         ------                     Clostridioides difficile...[[id number]]  Normal              Final result                 Please view results for these tests on the individual orders.    Clostridioides difficile Toxin, PCR - Stool, Per Rectum [[id number]]  (Normal) Collected: 08/13/23 0957    Specimen: Stool from Per Rectum Updated: 08/13/23 1125     Toxigenic C. difficile by PCR Negative    Narrative:      The result indicates the absence of toxigenic C. difficile from stool specimen.     Fecal Lactoferrin Qual. - Stool, Per Rectum [[id number]]  (Abnormal) Collected: 08/13/23 1001    Specimen: Stool from Per Rectum Updated: 08/13/23 1058      Lactoferrin, Qual Positive    Pancreatic Elastase, Fecal - Stool, Per Rectum [[id number]] Collected: 08/13/23 1001    Specimen: Stool from Per Rectum Updated: 08/13/23 1008    Magnesium [[id number]]  (Abnormal) Collected: 08/13/23 0532    Specimen: Blood Updated: 08/13/23 0646     Magnesium 1.4 mg/dL     Comprehensive Metabolic Panel [[id number]]  (Abnormal) Collected: 08/13/23 0532    Specimen: Blood Updated: 08/13/23 0646     Glucose 238 mg/dL      BUN 27 mg/dL      Creatinine 1.21 mg/dL      Sodium 143 mmol/L      Potassium 2.2 mmol/L      Chloride 107 mmol/L      CO2 27.0 mmol/L      Calcium 6.2 mg/dL      Total Protein 4.4 g/dL      Albumin 1.7 g/dL      ALT (SGPT) 19 U/L      AST (SGOT) 29 U/L      Alkaline Phosphatase 157 U/L      Total Bilirubin 0.3 mg/dL      Globulin 2.7 gm/dL      A/G Ratio 0.6 g/dL      BUN/Creatinine Ratio 22.3     Anion Gap 9.0 mmol/L      eGFR 45.7 mL/min/1.73     Narrative:      GFR Normal >60  Chronic Kidney Disease <60  Kidney Failure <15    The GFR formula is only valid for adults with stable renal function between ages 18 and 70.    Manual Differential [[id number]]  (Abnormal) Collected: 08/13/23 0439    Specimen: Blood Updated: 08/13/23 0601     Neutrophil % 96.8 %      Lymphocyte % 0.0 %      Monocyte % 3.2 %      Neutrophils Absolute 25.15 10*3/mm3      Lymphocytes Absolute 0.00 10*3/mm3      Monocytes Absolute 0.83 10*3/mm3      Ovalocytes Slight/1+     Poikilocytes Slight/1+     Polychromasia Slight/1+     WBC Morphology Normal     Platelet Morphology Normal    TSH [[id number]] Collected: 08/13/23 0439    Specimen: Blood Updated: 08/13/23 0531     TSH --     Comment: Suspected contamination based on CMP. Called to Antonia POWELL RN, will redraw to confirm  Corrected result. Previous result was 0.623 uIU/mL on 8/13/2023 at 0526 EDT.       Phosphorus [[id number]] Collected: 08/13/23 0439    Specimen: Blood Updated: 08/13/23 0531     Phosphorus --     Comment: Suspected contamination  based on CMP. Called to Antonia POWELL RN, will redraw to confirm  Corrected result. Previous result was 2.5 mg/dL on 8/13/2023 at 0520 EDT.       Lipid Panel [[id number]] Collected: 08/13/23 0439    Specimen: Blood Updated: 08/13/23 0530     Total Cholesterol --     Comment: Suspected contamination based on CMP. Called to Antonia POWELL RN, will redraw to confirm  Corrected result. Previous result was 77 mg/dL on 8/13/2023 at 0520 EDT.        Triglycerides --     Comment: Suspected contamination based on CMP. Called to Antonia POWELL RN, will redraw to confirm  Corrected result. Previous result was 76 mg/dL on 8/13/2023 at 0520 EDT.        HDL Cholesterol --     Comment: Suspected contamination based on CMP. Called to Antonia POWELL RN, will redraw to confirm  Corrected result. Previous result was 39 mg/dL on 8/13/2023 at 0520 EDT.        LDL Cholesterol  --     Comment: Suspected contamination based on CMP. Called to Antonia POWELL RN, will redraw to confirm  Corrected result. Previous result was 22 mg/dL on 8/13/2023 at 0520 EDT.        VLDL Cholesterol --     Comment: Suspected contamination based on CMP. Called to Antonia POWLEL RN, will redraw to confirm  Corrected result. Previous result was 16 mg/dL on 8/13/2023 at 0520 EDT.        LDL/HDL Ratio --     Comment: Corrected result. Previous result was 0.58 on 8/13/2023 at 0520 EDT.       Narrative:      Cholesterol Reference Ranges  (U.S. Department of Health and Human Services ATP III Classifications)    Desirable          <200 mg/dL  Borderline High    200-239 mg/dL  High Risk          >240 mg/dL      Triglyceride Reference Ranges  (U.S. Department of Health and Human Services ATP III Classifications)    Normal           <150 mg/dL  Borderline High  150-199 mg/dL  High             200-499 mg/dL  Very High        >500 mg/dL    HDL Reference Ranges  (U.S. Department of Health and Human Services ATP III Classifications)    Low     <40 mg/dl (major risk factor for CHD)  High    >60  mg/dl ('negative' risk factor for CHD)        LDL Reference Ranges  (U.S. Department of Health and Human Services ATP III Classifications)    Optimal          <100 mg/dL  Near Optimal     100-129 mg/dL  Borderline High  130-159 mg/dL  High             160-189 mg/dL  Very High        >189 mg/dL    Hemoglobin A1c [[id number]]  (Normal) Collected: 08/13/23 0439    Specimen: Blood Updated: 08/13/23 0511     Hemoglobin A1C 5.10 %     Narrative:      Hemoglobin A1C Ranges:    Increased Risk for Diabetes  5.7% to 6.4%  Diabetes                     >= 6.5%  Diabetic Goal                < 7.0%    CBC & Differential [[id number]]  (Abnormal) Collected: 08/13/23 0439    Specimen: Blood Updated: 08/13/23 0506    Narrative:      The following orders were created for panel order CBC & Differential.  Procedure                               Abnormality         Status                     ---------                               -----------         ------                     CBC Auto Differential[[id number]]        Abnormal            Final result                 Please view results for these tests on the individual orders.    CBC Auto Differential [[id number]]  (Abnormal) Collected: 08/13/23 0439    Specimen: Blood Updated: 08/13/23 0506     WBC 25.98 10*3/mm3      RBC 3.57 10*6/mm3      Hemoglobin 10.6 g/dL      Hematocrit 31.1 %      MCV 87.1 fL      MCH 29.7 pg      MCHC 34.1 g/dL      RDW 12.8 %      RDW-SD 41.0 fl      MPV 9.2 fL      Platelets 231 10*3/mm3      nRBC 0.0 /100 WBC     Blood Culture - Blood, Arm, Left [[id number]]  (Normal) Collected: 08/11/23 2130    Specimen: Blood from Arm, Left Updated: 08/12/23 2147     Blood Culture No growth at 24 hours    Narrative:      Less than seven (7) mL's of blood was collected.  Insufficient quantity may yield false negative results.    Blood Culture - Blood, Arm, Right [[id number]]  (Normal) Collected: 08/11/23 2101    Specimen: Blood from Arm, Right Updated: 08/12/23 2117     Blood  Culture No growth at 24 hours    Cortisol [[id number]] Collected: 08/12/23 1625    Specimen: Blood Updated: 08/12/23 2108     Cortisol 104.00 mcg/dL     Narrative:      Cortisol Reference Ranges:    Cortisol 6AM - 10AM Range: 6.02-18.40 mcg/dl  Cortisol 4PM - 8PM Range: 2.68-10.50 mcg/dl      Results may be falsely increased if patient taking Biotin.      Comprehensive Metabolic Panel [[id number]]  (Abnormal) Collected: 08/12/23 1625    Specimen: Blood Updated: 08/12/23 1704     Glucose 131 mg/dL      BUN 31 mg/dL      Creatinine 1.53 mg/dL      Sodium 144 mmol/L      Potassium 3.3 mmol/L      Chloride 112 mmol/L      CO2 14.0 mmol/L      Calcium 6.5 mg/dL      Total Protein 5.0 g/dL      Albumin 2.0 g/dL      ALT (SGPT) 22 U/L      AST (SGOT) 37 U/L      Alkaline Phosphatase 180 U/L      Total Bilirubin 0.2 mg/dL      Globulin 3.0 gm/dL      A/G Ratio 0.7 g/dL      BUN/Creatinine Ratio 20.3     Anion Gap 18.0 mmol/L      eGFR 34.5 mL/min/1.73     Narrative:      GFR Normal >60  Chronic Kidney Disease <60  Kidney Failure <15    The GFR formula is only valid for adults with stable renal function between ages 18 and 70.    BNP [[id number]]  (Abnormal) Collected: 08/12/23 1625    Specimen: Blood Updated: 08/12/23 1657     proBNP 11,574.0 pg/mL     Narrative:      Among patients with dyspnea, NT-proBNP is highly sensitive for the detection of acute congestive heart failure. In addition NT-proBNP of <300 pg/ml effectively rules out acute congestive heart failure with 99% negative predictive value.      STAT Lactic Acid, Reflex [[id number]]  (Normal) Collected: 08/12/23 1625    Specimen: Blood Updated: 08/12/23 1655     Lactate 1.7 mmol/L           Imaging Results (Last 24 Hours)       ** No results found for the last 24 hours. **            Current Facility-Administered Medications:     amLODIPine (NORVASC) tablet 10 mg, 10 mg, Oral, Daily, Nimesh Smith MD, 10 mg at 08/13/23 0838    aspirin chewable tablet 81 mg, 81 mg,  Oral, Daily, Nimesh Smith MD, 81 mg at 08/13/23 0838    Calcium Replacement - Follow Nurse / BPA Driven Protocol, , Does not apply, PRN, Isaac Patel MD    famotidine (PEPCID) injection 20 mg, 20 mg, Intravenous, Q12H, Nimesh Smith MD, 20 mg at 08/13/23 0837    hydrALAZINE (APRESOLINE) injection 10 mg, 10 mg, Intravenous, Q4H PRN, Nimesh Smith MD    HYDROmorphone (DILAUDID) injection 0.5 mg, 0.5 mg, Intravenous, Q4H PRN, Nimesh Smith MD, 0.5 mg at 08/12/23 0951    Magnesium Standard Dose Replacement - Follow Nurse / BPA Driven Protocol, , Does not apply, PRN, Isaac Patel MD    ondansetron (ZOFRAN) injection 4 mg, 4 mg, Intravenous, Q6H PRN, Nimesh Smith MD, 4 mg at 08/13/23 0439    Phosphorus Replacement - Follow Nurse / BPA Driven Protocol, , Does not apply, PRN, Isaac Patel MD    piperacillin-tazobactam (ZOSYN) 3.375 g in iso-osmotic dextrose 50 ml (premix), 3.375 g, Intravenous, Q8H, Nimesh Smith MD, Last Rate: 0 mL/hr at 08/12/23 2142, 3.375 g at 08/13/23 1442    Potassium Replacement - Follow Nurse / BPA Driven Protocol, , Does not apply, PRN, Isaac Patel MD    [COMPLETED] Insert Peripheral IV, , , Once **AND** sodium chloride 0.9 % flush 10 mL, 10 mL, Intravenous, PRN, Godfrey Arrieta MD, 10 mL at 08/13/23 0838    sodium chloride 0.9 % with KCl 20 mEq/L infusion, 125 mL/hr, Intravenous, Continuous, Isaac Patel MD, Last Rate: 125 mL/hr at 08/13/23 1038, 125 mL/hr at 08/13/23 1038    sucralfate (CARAFATE) tablet 1 g, 1 g, Oral, 4x Daily AC & at Bedtime, Dominik Arnold MD, 1 g at 08/13/23 1308     ASSESSMENT  Acute kidney injury secondary to volume depletion  Abdominal pain with nausea vomiting diarrhea enteropathogenic E. coli positive by GI  Hypokalemia hypomagnesemia replaced  Probably ischemic bowel  Nonsustained V. tach  Metabolic acidosis resolved  Peripheral artery disease  History of pancreatic cancer    PLAN  CPM  Continue IVF  Continue IV  antibiotics  Symptomatic treatment for abdominal pain nausea vomiting  Mesenteric Doppler study pending  GI consult appreciated  Nephrology and cardiology to follow patient  Adjust home medications  Stress ulcer DVT prophylaxis  Supportive care  DNR  Discussed with family nursing staff  Follow closely further recommendation current hospital course    GENESIS MATTHEW MD    Copied text in this note has been reviewed and is accurate as of 08/13/23

## 2023-08-13 NOTE — CONSULTS
Patient Name: aLvern Mcduffie Account #: [id number]    MRN: [id number] Admission Date: 2023      Consulting Service: Vascular Surgery Date of Evaluation: 2023    Requesting Provider: Dominik Arnold MD        [facility]      CHIEF COMPLAINT: Chronic mesenteric ischemia  HPI: Lavern Mcduffie is a 77 y.o. female is being seen for a consultation and evaluation/management of chronic mesenteric ischemia.  Patient is a very complicated situation where she has had a prior Whipple year ago and is currently felt to be a survivor of pancreatic cancer.  She unfortunately has dropped her weight after the surgery and not been able to gain any back as she gets abdominal pain in the upper abdomen when she eats is followed by nausea and vomiting.  This does not occur immediately and does not seem to be a mechanical problem this occurs within the 15 to 20-minute.  Following this she gets fairly intense diarrhea if she eats enough.  This sounds very suspicious for mesenteric ischemia of chronic nature.  CT scan performed showed occlusive disease at the mesenteric bases although the quality of the study was suspect.  Her kidney function has survived this dye load but did take some time to recover.  Duplex scan done today to define the disease better shows widely patent celiac artery but an occluded SMA and a stenosed MURIEL.    PAST MEDICAL HISTORY:   Past Medical History:   Diagnosis Date    Arthritis     Hyperlipidemia     Hypertension     Right hip pain       PAST SURGICAL HISTORY:   Past Surgical History:   Procedure Laterality Date    COLONOSCOPY      INGUINAL HERNIA REPAIR Left     TONSILLECTOMY      TOTAL HIP ARTHROPLASTY Left     TOTAL HIP ARTHROPLASTY Right 2021    Procedure: RIGHT TOTAL HIP ARTHROPLASTY ANTERIOR WITH HANA TABLE;  Surgeon: Sven Rouse MD;  Location: [facility];  Service: Orthopedics;  Laterality: Right;      FAMILY HISTORY:   Family History   Problem Relation Age of Onset     "Heart attack Father     Heart disease Father     Malig Hyperthermia Neg Hx       SOCIAL HISTORY:   Social History     Tobacco Use    Smoking status: Former     Packs/day: 0.50     Years: 30.00     Pack years: 15.00     Types: Cigarettes     Quit date:      Years since quittin.6    Smokeless tobacco: Never   Vaping Use    Vaping Use: Never used   Substance Use Topics    Alcohol use: Yes     Comment: SOCIALLY    Drug use: Never      MEDICATIONS:   No current facility-administered medications on file prior to encounter.     Current Outpatient Medications on File Prior to Encounter   Medication Sig Dispense Refill    Dietary Management Product (Vasculera) tablet Take  by mouth.      amoxicillin (AMOXIL) 500 MG capsule Take 4 capsules by mouth Take As Directed. PRIOR TO DENTAL APPT      Dietary Management Product (VASCULERA PO) Take 630 mg by mouth Daily.      lidocaine (LIDODERM) 5 % Place 1 patch on the skin as directed by provider Daily. Remove & Discard patch within 12 hours or as directed by MD 15 each 0    losartan (COZAAR) 100 MG tablet Take 1 tablet by mouth Daily.      lovastatin (MEVACOR) 40 MG tablet Take 1 tablet by mouth Daily.               ALLERGIES: Patient has no known allergies.   COMPLETE REVIEW OF SYSTEMS:     ENT ROS: negative  Cardiovascular ROS: no chest pain or dyspnea on exertion  Respiratory ROS: no cough, shortness of breath, or wheezing  Gastrointestinal ROS: Abdominal pain and diarrhea after eating, fear of food noted   neurological ROS: no TIA or stroke symptoms  Genito-Urinary ROS: no dysuria, trouble voiding, or hematuria  Musculoskeletal ROS: negative  Dermatological ROS: negative  Psychological ROS: negative      PHYSICAL EXAM:   Patient Vitals for the past 24 hrs:   BP Temp Temp src Pulse Resp SpO2 Height Weight   23 1353 90/49 98.9 øF (37.2 øC) Oral 113 16 92 % 152.4 cm (60\") 44 kg (97 lb)   23 0730 121/65 98.4 øF (36.9 øC) Oral 97 16 (!) 89 % -- --   23 2318 " 122/71 98.4 øF (36.9 øC) Oral 100 16 91 % -- --   08/12/23 1927 110/70 98.4 øF (36.9 øC) Oral 117 16 (!) 89 % -- --        General appearance: oriented to person, place, and time, in mild to moderate distress, ill-appearing, and chronically ill appearing.  Neurological exam reveals alert, oriented, normal speech, no focal findings or movement disorder noted.  ENT exam reveals - ENT exam normal, no neck nodes or sinus tenderness.  CVS exam: normal rate, regular rhythm, normal S1, S2, no murmurs, rubs, clicks or gallops.  Chest: clear to auscultation, no wheezes, rales or rhonchi, symmetric air entry.  Abdominal exam: soft, moderately tender, nondistended, no masses or organomegaly.  No rebound or guarding  Examination of the feet reveals warm, good capillary refill.  Femoral pulses are palpable, popliteal and pedal pulses nonpalpable      LABS:      Results Review:       I reviewed the patient's new clinical results.  Results from last 7 days   Lab Units 08/13/23  0439 08/12/23  0637 08/11/23 2029   WBC 10*3/mm3 25.98* 24.05* 22.49*   HEMOGLOBIN g/dL 10.6* 12.4 12.0   PLATELETS 10*3/mm3 231 284 279     Results from last 7 days   Lab Units 08/13/23  0532 08/12/23  1625 08/11/23 2029   SODIUM mmol/L 143 144 134*   POTASSIUM mmol/L 2.2* 3.3* 3.2*   CHLORIDE mmol/L 107 112* 100   CO2 mmol/L 27.0 14.0* 11.4*   BUN mg/dL 27* 31* 32*   CREATININE mg/dL 1.21* 1.53* 1.45*   GLUCOSE mg/dL 238* 131* 88   Estimated Creatinine Clearance: 26.2 mL/min (A) (by C-G formula based on SCr of 1.21 mg/dL (H)).  Results from last 7 days   Lab Units 08/13/23  0532 08/12/23  1625 08/11/23 2029   CALCIUM mg/dL 6.2* 6.5* 7.0*   ALBUMIN g/dL 1.7* 2.0* 2.0*   MAGNESIUM mg/dL 1.4*  --   --            The following radiologic or non-invasive studies have been reviewed by me: Mesenteric duplex and CT scan of the abdomen pelvis with  IV contrast reviewed with images reviewed    Active Hospital Problems    Diagnosis  POA    **Vomiting and diarrhea  [R11.10, R19.7]  Yes    Hypokalemia [E87.6]  Unknown    Hypomagnesemia [E83.42]  Unknown      Resolved Hospital Problems   No resolved problems to display.         ASSESSMENT/PLAN: 77 y.o. 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  My suspicion is that even though her celiac artery is widely patent and normally this would give her an adequate collateral bed to perfuse her mesentery, the Whipple procedure has disrupted a great number of the gastroduodenal collaterals and her mesenteric ischemia is likely potentiated by this loss of collateral beds.  Is unclear if we can recount cannulized her SMA but this may be our best chance for an attempt.  Open repair in the face of a 1 year out Whipple is unlikely to be technically feasible much less survivable and a 97 pound frail woman who is already undergone a massive operation once and has apparently very little interest in pursuing it again.  We will discuss these findings with the patient and family and make a decision as to whether or not attempt at angiographic improvement can be made.      I discussed the plan with the patient and her sons and they are agreeable to the plan of care at this point. Thank you for this consult.     Anthony Smalls MD   08/13/23

## 2023-08-14 NOTE — PROGRESS NOTES
[facility]  Cardiology Progress Note    Patient Identification:  Name: Lavern Mcduffie  Age: 77 y.o.  Sex: female  :  1945  MRN: [id number]                 Follow Up / Chief Complaint: SVT    Interval History: Possible procedure with vascular tomorrow.  Her echo revealed EF 60.4%, LV function was indeterminate and RV systolic pressure from TV regurgitation is normal.  She denies any chest pain.         Objective:    Past Medical History:  Past Medical History:   Diagnosis Date    Arthritis     Hyperlipidemia     Hypertension     Right hip pain      Past Surgical History:  Past Surgical History:   Procedure Laterality Date    COLONOSCOPY      INGUINAL HERNIA REPAIR Left     TONSILLECTOMY      TOTAL HIP ARTHROPLASTY Left     TOTAL HIP ARTHROPLASTY Right 2021    Procedure: RIGHT TOTAL HIP ARTHROPLASTY ANTERIOR WITH HANA TABLE;  Surgeon: Sven Rouse MD;  Location: [facility];  Service: Orthopedics;  Laterality: Right;        Social History:   Social History     Tobacco Use    Smoking status: Former     Packs/day: 0.50     Years: 30.00     Pack years: 15.00     Types: Cigarettes     Quit date:      Years since quittin.6    Smokeless tobacco: Never   Substance Use Topics    Alcohol use: Yes     Comment: SOCIALLY      Family History:  Family History   Problem Relation Age of Onset    Heart attack Father     Heart disease Father     Malig Hyperthermia Neg Hx           Allergies:  No Known Allergies  Scheduled Meds:  aspirin, 81 mg, Daily  azithromycin, 500 mg, Q24H  calcium gluconate, 1,000 mg, Q1H   Followed by  calcium gluconate, 2,000 mg, Q2H  famotidine, 40 mg, BID AC  piperacillin-tazobactam, 3.375 g, Q8H  calcium polycarbophil, 625 mg, BID  sucralfate, 1 g, 4x Daily AC & at Bedtime            INTAKE AND OUTPUT:    Intake/Output Summary (Last 24 hours) at 2023 0935  Last data filed at 2023  Gross per 24 hour   Intake 120 ml   Output --   Net 120 ml        Review of Systems:   GI: Nausea  Cardiac: No chest pain  Pulmonary: no shob    Constitutional:  Temp:  [97.3 øF (36.3 øC)-98.9 øF (37.2 øC)] 97.3 øF (36.3 øC)  Heart Rate:  [103-113] 107  Resp:  [16-18] 18  BP: ()/() 128/113       Physical Exam:  General:  Appears in no acute distress, resting in bed  Eyes: eom normal no conjunctival drainage  HEENT:  No JVD. Thyroid not visibly enlarged. No mucosal pallor or cyanosis  Respiratory: Respirations regular and unlabored at rest. BBS with good air entry in all fields. No crackles, rubs or wheezes auscultated  Cardiovascular: S1S2 Regular rate and rhythm. No murmur, rub or gallop. No carotid bruits. DP/PT pulses     . No pretibial pitting edema  Gastrointestinal: Abdomen soft, flat, non tender. Bowel sounds present. No hepatosplenomegaly. No ascites  Skin:   Skin warm and dry to touch. No rashes    Neuro: AAO x3 CN II-XII grossly intact  Psych: Mood and affect normal, pleasant and cooperative          I reviewed the patient's new clinical results, and personally reviewed and interpreted the patient's ECG and telemetry data from the last 24 hours      Cardiographics                   Echocardiogram:     Interpretation Summary         Left ventricular systolic function is normal. Calculated left ventricular EF = 60.4%    Left ventricular diastolic function was indeterminate.    Estimated right ventricular systolic pressure from tricuspid regurgitation is normal (<35 mmHg).     Interpretation Summary       Findings consistent with a normal ECG stress test.  Left ventricular ejection fraction is normal. (Calculated EF = 60%).  Myocardial perfusion imaging indicates a normal myocardial perfusion study with no evidence of ischemia.  Impressions are consistent with a low risk study.     Asymptomatic for chest pain. ECG is negative for ischemia.   Ectopy: rare PVC with exercise, none at baseline and recovery  B/P: Baseline Hypertensive 130/60, Peak (post  "Lexiscan)128/64 and Recovery:120//64     Pharmacologic study due to inability to tolerate increasing speed and grade of treadmill due to orthopedic and  mobility  Issues, Use a rolling walker for ambulation. Unable to walk on treadmill due to c/o right hip pain and poor balance, however was able to perform low level exercise at the chairside. Tolerance was good.     Supervised by: Dr. Doyle  Lab Review       Results from last 7 days   Lab Units 08/14/23  0528   MAGNESIUM mg/dL 1.8     Results from last 7 days   Lab Units 08/14/23  0528   SODIUM mmol/L 149*   POTASSIUM mmol/L 4.8   BUN mg/dL 15   CREATININE mg/dL 0.78   CALCIUM mg/dL 5.8*       Results from last 7 days   Lab Units 08/14/23  0528 08/13/23  0439 08/12/23  0637   WBC 10*3/mm3 23.74* 25.98* 24.05*   HEMOGLOBIN g/dL 10.5* 10.6* 12.4   HEMATOCRIT % 31.0* 31.1* 37.7   PLATELETS 10*3/mm3 218 231 284             Assessment:  Short burst of SVT  Severe hypokalemia: nephrology following. Resolved  Markedly elevated BNP: Echo stable.  Hypertension  Hyperlipidemia  PVD  Pancreatic cancer status post resection a year ago  Left pleural effusion    Plan:  Sinus rhythm on the monitor.  No events overnight.  She will undergo a stress test.    Agree with discontinuing the IV fluids.    It was explained to the patient that stress testing carries 85% specificity/sensitivity, and does not rule out future cardiac event.  Risks of the procedure were explained to the patient including shortness of breath, induction of myocardial infarction, and dizziness.  Patient is agreeable to proceeding with stress testing.       )8/14/2023  KAIN Terrell/Transcription:   \"Dictated utilizing Dragon dictation\".     "

## 2023-08-14 NOTE — PLAN OF CARE
Goal Outcome Evaluation:  Plan of Care Reviewed With: patient           Outcome Evaluation: Pt agreeable to PT eval this am. She was admitted to [facility] on 8/11 w vomiting and diarrhea. Pt w hx of pancreatic resection as well as HTN, HLD, and PVD. At baseline, pt lives alone and reports independence w mobility and ADLs. She states she has recently been using walker for ambulation. Today, pt presents w increased weakness, decreased activity tolerance, and overall decreased functional mobility. She was able to transition to EOB w min A. SHe stood w min A using Rwx. Pt attempted several side steps up toward HOB, but is limited by weakness and requested to sit back down. Pt assisted back to supine w min A x 2. Plans for possible procedure tomorrow. Pt is [place] to return home at [place], but may benefit from SNU pending progress. SHe will continue to benefit from skilled PT to maximize safety, strength, and independence w mobility.      Anticipated Discharge Disposition (PT): home with assist, home with home health, skilled nursing facility

## 2023-08-14 NOTE — PROGRESS NOTES
[facility]     Progress Note    Patient Name: Lavern Mcduffie  : 1945  MRN: [id number]  Primary Care Physician:  Ellen Hayes MD  Date of admission: 2023    Subjective   Subjective     Chief Complaint: diarrhea, weight loss     History of Present Illness  Patient Reports diarrhea poor appetite, less abdominal pain     Review of Systems   Constitutional:  Positive for unexpected weight change.   Gastrointestinal:  Positive for diarrhea.   Neurological:  Positive for weakness.     Objective   Objective     Vitals:   Temp:  [97.3 øF (36.3 øC)-98.9 øF (37.2 øC)] 97.3 øF (36.3 øC)  Heart Rate:  [103-113] 106  Resp:  [16-18] 18  BP: ()/() 128/113  Flow (L/min):  [2] 2    Physical Exam  HENT:      Right Ear: External ear normal.      Left Ear: External ear normal.      Mouth/Throat:      Pharynx: Oropharynx is clear.   Eyes:      Conjunctiva/sclera: Conjunctivae normal.   Pulmonary:      Effort: Pulmonary effort is normal.   Abdominal:      General: Abdomen is flat.   Neurological:      Mental Status: She is alert.   Psychiatric:         Mood and Affect: Mood normal.        Result Review    Result Review:  I have personally reviewed the results from the time of this admission to 2023 07:47 EDT and agree with these findings:  []  Laboratory list / accordion  []  Microbiology  []  Radiology  []  EKG/Telemetry   []  Cardiology/Vascular   []  Pathology  []  Old records  []  Other:  Most notable findings include:       Assessment & Plan   Assessment / Plan     Brief Patient Summary:  Lavern Mcduffie is a 77 y.o. 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    CODE STATUS:    Level Of Support Discussed With: Patient  Code Status (Patient has no pulse and is not breathing): CPR (Attempt to Resuscitate)  Medical Interventions (Patient has pulse or is breathing): Full Support    Disposition:  I expect patient to be discharged uncertain .    Dominik Arnold MD

## 2023-08-14 NOTE — PROGRESS NOTES
"Daily progress note    Primary care physician      Chief complaint  Doing same with no new issues but he still has no good BM yet and denies any abdominal pain nausea vomiting.    History of present illness  77-year-old white female with history of pancreatic cancer hypertension hyperlipidemia and peripheral vascular disease brought to the emergency room by the family with severe abdominal pain for last 1 week that she is not eating drinking.  Patient also complained of nausea vomiting after eating and has couple of loose stools.  Patient denies any fever chills black stools blood in the stools.  Patient afraid of eating as it hurts more when she eats.  Patient work-up in ER revealed acute kidney injury and probably has ischemic bowel admitted for management.  Patient is DNR per her wishes.     REVIEW OF SYSTEMS  Unremarkable except generalized weakness     PHYSICAL EXAM  Blood pressure 112/73, pulse 107, temperature 97.3 øF (36.3 øC), temperature source Oral, resp. rate 18, height 152.4 cm (60\"), weight 44 kg (97 lb), SpO2 94 %.    GENERAL: Awake and alert, chronically ill-appearing, cachectic, no acute distress  HENT: nares patent, mucous membranes dry  EYES: no scleral icterus, EOMI  CV: regular rhythm, normal rate, port present right anterior chest wall  RESPIRATORY: normal effort  ABDOMEN: soft,, nondistended, moderate tenderness bowel sounds positive  MUSCULOSKELETAL: no deformity  NEURO: alert, moves all extremities, follows commands, cranial nerves II through XII intact, speech fluent and clear  PSYCH:  calm, cooperative  SKIN: warm, dry     LAB RESULTS  Lab Results (last 24 hours)       Procedure Component Value Units Date/Time    Pancreatic Elastase, Fecal - Stool, Per Rectum [[id number]] Collected: 08/14/23 1405    Specimen: Stool from Per Rectum Updated: 08/14/23 1405    Manual Differential [[id number]]  (Abnormal) Collected: 08/14/23 0528    Specimen: Blood Updated: 08/14/23 0747     Neutrophil % " 98.0 %      Lymphocyte % 1.0 %      Monocyte % 1.0 %      Neutrophils Absolute 23.27 10*3/mm3      Lymphocytes Absolute 0.24 10*3/mm3      Monocytes Absolute 0.24 10*3/mm3      Hypochromia Mod/2+     WBC Morphology Normal     Platelet Morphology Normal    CBC & Differential [[id number]]  (Abnormal) Collected: 08/14/23 0528    Specimen: Blood Updated: 08/14/23 0715    Narrative:      The following orders were created for panel order CBC & Differential.  Procedure                               Abnormality         Status                     ---------                               -----------         ------                     CBC Auto Differential[[id number]]        Abnormal            Final result                 Please view results for these tests on the individual orders.    CBC Auto Differential [[id number]]  (Abnormal) Collected: 08/14/23 0528    Specimen: Blood Updated: 08/14/23 0715     WBC 23.74 10*3/mm3      RBC 3.50 10*6/mm3      Hemoglobin 10.5 g/dL      Hematocrit 31.0 %      MCV 88.6 fL      MCH 30.0 pg      MCHC 33.9 g/dL      RDW 13.6 %      RDW-SD 44.4 fl      MPV 9.4 fL      Platelets 218 10*3/mm3      nRBC 0.0 /100 WBC     Basic Metabolic Panel [[id number]]  (Abnormal) Collected: 08/14/23 0528    Specimen: Blood Updated: 08/14/23 0648     Glucose 72 mg/dL      BUN 15 mg/dL      Creatinine 0.78 mg/dL      Sodium 149 mmol/L      Potassium 4.8 mmol/L      Chloride 114 mmol/L      CO2 25.7 mmol/L      Calcium 5.8 mg/dL      BUN/Creatinine Ratio 19.2     Anion Gap 9.3 mmol/L      eGFR 77.4 mL/min/1.73     Narrative:      GFR Normal >60  Chronic Kidney Disease <60  Kidney Failure <15    The GFR formula is only valid for adults with stable renal function between ages 18 and 70.    Phosphorus [[id number]]  (Normal) Collected: 08/14/23 0528    Specimen: Blood Updated: 08/14/23 0644     Phosphorus 3.4 mg/dL     Magnesium [[id number]]  (Normal) Collected: 08/14/23 0528    Specimen: Blood Updated: 08/14/23 0644      Magnesium 1.8 mg/dL     Blood Culture - Blood, Arm, Left [[id number]]  (Normal) Collected: 08/11/23 2130    Specimen: Blood from Arm, Left Updated: 08/13/23 2146     Blood Culture No growth at 2 days    Narrative:      Less than seven (7) mL's of blood was collected.  Insufficient quantity may yield false negative results.    Blood Culture - Blood, Arm, Right [[id number]]  (Normal) Collected: 08/11/23 2101    Specimen: Blood from Arm, Right Updated: 08/13/23 2115     Blood Culture No growth at 2 days    Renal Function Panel [[id number]]  (Abnormal) Collected: 08/13/23 1756    Specimen: Blood Updated: 08/13/23 1845     Glucose 124 mg/dL      BUN 20 mg/dL      Creatinine 1.02 mg/dL      Sodium 147 mmol/L      Potassium 3.1 mmol/L      Chloride 109 mmol/L      CO2 28.4 mmol/L      Calcium 6.2 mg/dL      Albumin 1.9 g/dL      Phosphorus 1.4 mg/dL      Anion Gap 9.6 mmol/L      BUN/Creatinine Ratio 19.6     eGFR 56.1 mL/min/1.73     Narrative:      GFR Normal >60  Chronic Kidney Disease <60  Kidney Failure <15    The GFR formula is only valid for adults with stable renal function between ages 18 and 70.    Magnesium [[id number]]  (Normal) Collected: 08/13/23 1756    Specimen: Blood Updated: 08/13/23 1828     Magnesium 2.1 mg/dL           Imaging Results (Last 24 Hours)       ** No results found for the last 24 hours. **            Current Facility-Administered Medications:     aspirin chewable tablet 81 mg, 81 mg, Oral, Daily, Nimesh Smith MD, 81 mg at 08/14/23 0858    azithromycin (ZITHROMAX) tablet 500 mg, 500 mg, Oral, Q24H, Dominik Arnold MD, 500 mg at 08/14/23 0858    calcium carbonate (TUMS) chewable tablet 500 mg (200 mg elemental), 2 tablet, Oral, BID, Isaac Patel MD    [COMPLETED] calcium gluconate 1000 Mg/50ml 0.675% NaCl IV SOLN, 1,000 mg, Intravenous, Q1H, 1,000 mg at 08/14/23 0904 **FOLLOWED BY** calcium gluconate 2000-675 MG/100ML NACL IVPB, 2,000 mg, Intravenous, Q2H, Nimesh Smith MD, 2,000 mg at  08/14/23 1027    Calcium Replacement - Follow Nurse / BPA Driven Protocol, , Does not apply, PRN, Isaac Patel MD    famotidine (PEPCID) tablet 40 mg, 40 mg, Oral, BID AC, Dominik Arnold MD, 40 mg at 08/14/23 0858    HYDROmorphone (DILAUDID) injection 0.5 mg, 0.5 mg, Intravenous, Q4H PRN, Nimesh Smith MD, 0.5 mg at 08/12/23 0951    loperamide (IMODIUM) capsule 2 mg, 2 mg, Oral, TID PRN, Dominik Arnold MD, 2 mg at 08/14/23 0906    Magnesium Standard Dose Replacement - Follow Nurse / BPA Driven Protocol, , Does not apply, PRN, Isaac Patel MD    ondansetron (ZOFRAN) injection 4 mg, 4 mg, Intravenous, Q6H PRN, Nimesh Smith MD, 4 mg at 08/13/23 0439    Phosphorus Replacement - Follow Nurse / BPA Driven Protocol, , Does not apply, PRN, Isaac Patel MD    polycarbophil tablet 625 mg, 625 mg, Oral, BID, Dominik Arnold MD, 625 mg at 08/14/23 0858    Potassium Replacement - Follow Nurse / BPA Driven Protocol, , Does not apply, Amanda ERWIN Andrew James, MD    [COMPLETED] Insert Peripheral IV, , , Once **AND** sodium chloride 0.9 % flush 10 mL, 10 mL, Intravenous, PRN, Godfery Arrieta MD, 10 mL at 08/13/23 0838    sucralfate (CARAFATE) tablet 1 g, 1 g, Oral, 4x Daily AC & at Bedtime, Dominik Arnold MD, 1 g at 08/14/23 1322     ASSESSMENT  Superior mesenteric artery occlusion  Acute kidney injury resolved  Abdominal pain with nausea vomiting diarrhea enteropathogenic E. coli positive by GI  Hypokalemia hypomagnesemia replaced  Probably ischemic bowel  Nonsustained V. tach  Metabolic acidosis resolved  Peripheral artery disease  History of pancreatic cancer    PLAN  CPM  Continue IVF  Continue IV antibiotics  Bowel program  Symptomatic treatment for abdominal pain nausea vomiting  Vascular surgery input appreciated  Gastroenterology nephrology and cardiology to follow patient  Adjust home medications  Stress ulcer DVT prophylaxis  Supportive care  PT/OT  Discussed with family nursing  staff  Follow closely further recommendation current hospital course    GENESIS MATTHEW MD    Copied text in this note has been reviewed and is accurate as of 08/14/23

## 2023-08-14 NOTE — PLAN OF CARE
Goal Outcome Evaluation:  Plan of Care Reviewed With: patient           Outcome Evaluation: Denies nausea or pain. Appetite poor. Had 2 small liquid brown BM's. Voiding well. KCL and Mag replaced via mediport. Echo and Mesenteric doppler completed.02 2L NC. Telemetry ST

## 2023-08-14 NOTE — PLAN OF CARE
Goal Outcome Evaluation:           Progress: no change  Outcome Evaluation: Pt AOx4. On RA. NSR-ST on monitor. On full liq diet. Npo at midnight for arteriogram tomorrow afternoon (mesentaric/illiac intervention). On IV abx. IV calcium replacement through R chest port- Dressing C/D/I. Stood EOB w/ PT. IVFs stopped-- pt was weeping from BUE and had BLE edema 1-2+ (also could be from low albumin)-- 1 dose 20mg lasix given IV. Stress test completed. Incontience care prn, q2 turns, heel boots added- wound care per orders. BLE skin tears dressing changed a couple of times. Pt not in acute distress. Plan of care ongoing.    C/o diarrhea-- immodium given x2

## 2023-08-14 NOTE — THERAPY EVALUATION
Patient Name: Lavern Mcduffie  : 1945    MRN: [id number]                              Today's Date: 2023       Admit Date: 2023    Visit Dx:     ICD-10-CM ICD-9-CM   1. Vomiting and diarrhea  R11.10 787.03    R19.7 787.91   2. Lactic acidosis  E87.20 276.2   3. Leukocytosis, unspecified type  D72.829 288.60   4. Pleural effusion, left  J90 511.9     Patient Active Problem List   Diagnosis    H/O total hip arthroplasty    DJD (degenerative joint disease)    Preop cardiovascular exam    Abnormal EKG    Vomiting and diarrhea    Hypokalemia    Hypomagnesemia     Past Medical History:   Diagnosis Date    Arthritis     Hyperlipidemia     Hypertension     Right hip pain      Past Surgical History:   Procedure Laterality Date    COLONOSCOPY      INGUINAL HERNIA REPAIR Left     TONSILLECTOMY      TOTAL HIP ARTHROPLASTY Left     TOTAL HIP ARTHROPLASTY Right 2021    Procedure: RIGHT TOTAL HIP ARTHROPLASTY ANTERIOR WITH HANA TABLE;  Surgeon: Sven Rouse MD;  Location: [facility];  Service: Orthopedics;  Laterality: Right;      General Information       Row Name 23 0859          Physical Therapy Time and Intention    Document Type evaluation  -EJ     Mode of Treatment physical therapy  -EJ       Row Name 23 0859          General Information    Patient Profile Reviewed yes  -EJ     Prior Level of Function independent:;all household mobility;community mobility;ADL's  recently been using walker for ambulation  -EJ     Existing Precautions/Restrictions fall  -EJ     Barriers to Rehab medically complex  -EJ       Row Name 23 0859          Living Environment    People in Home alone  -EJ       Row Name 23 0859          Stairs Within Home, Primary    Number of Stairs, Within Home, Primary none  -EJ       Row Name 23 0859          Cognition    Orientation Status (Cognition) oriented x 3  -EJ       Row Name 23 0859          Safety Issues, Functional Mobility     Impairments Affecting Function (Mobility) strength;endurance/activity tolerance;balance  -EJ               User Key  (r) = Recorded By, (t) = Taken By, (c) = Cosigned By      Initials Name Provider Type    Cindi Xiao, PT Physical Therapist                   Mobility       Row Name 08/14/23 0901          Bed Mobility    Bed Mobility supine-sit;sit-supine  -EJ     Supine-Sit [place] (Bed Mobility) verbal cues;minimum assist (75% patient effort)  -EJ     Sit-Supine [place] (Bed Mobility) minimum assist (75% patient effort);2 person assist  -EJ     Assistive Device (Bed Mobility) head of bed elevated;bed rails  -       Row Name 08/14/23 0901          Sit-Stand Transfer    Sit-Stand [place] (Transfers) minimum assist (75% patient effort);verbal cues  -EJ     Assistive Device (Sit-Stand Transfers) walker, front-wheeled  -EJ     Comment, (Sit-Stand Transfer) cues for upright posture  -EJ       Row Name 08/14/23 0901          Gait/Stairs (Locomotion)    [place] Level (Gait) verbal cues;minimum assist (75% patient effort)  -EJ     Assistive Device (Gait) walker, front-wheeled  -EJ     Distance in Feet (Gait) 1  -EJ     Deviations/Abnormal Patterns (Gait) andrei decreased;weight shifting decreased;stride length decreased  -EJ     Bilateral Gait Deviations forward flexed posture;heel strike decreased  -EJ     Comment, (Gait/Stairs) attempted several side steps toward HOB, pt very weak and limited by fatigue at this time, requests to sit back down  -EJ               User Key  (r) = Recorded By, (t) = Taken By, (c) = Cosigned By      Initials Name Provider Type    Cindi Xiao, PT Physical Therapist                   Obj/Interventions       Row Name 08/14/23 0902          Range of Motion Comprehensive    General Range of Motion no range of motion deficits identified  -       Row Name 08/14/23 0902          Strength Comprehensive (MMT)    Comment, General Manual Muscle Testing (MMT)  Assessment generalized weakness  -EJ       Row Name 08/14/23 0902          Balance    Balance Assessment sitting static balance;standing static balance  -EJ     Static Sitting Balance contact guard  -EJ     Position, Sitting Balance sitting edge of bed  -EJ     Static Standing Balance minimal assist  -EJ     Position/Device Used, Standing Balance walker, front-wheeled  -EJ               User Key  (r) = Recorded By, (t) = Taken By, (c) = Cosigned By      Initials Name Provider Type    EJ Cindi De Dios, PT Physical Therapist                   Goals/Plan       Row Name 08/14/23 0907          Bed Mobility Goal 1 (PT)    Activity/Assistive Device (Bed Mobility Goal 1, PT) bed mobility activities, all  -EJ     [place] Level/Cues Needed (Bed Mobility Goal 1, PT) standby assist  -EJ     Time Frame (Bed Mobility Goal 1, PT) 1 week  -EJ       Row Name 08/14/23 0907          Transfer Goal 1 (PT)    Activity/Assistive Device (Transfer Goal 1, PT) transfers, all;walker, rolling  -EJ     [place] Level/Cues Needed (Transfer Goal 1, PT) standby assist  -EJ     Time Frame (Transfer Goal 1, PT) 1 week  -EJ       Row Name 08/14/23 0907          Gait Training Goal 1 (PT)    Activity/Assistive Device (Gait Training Goal 1, PT) gait (walking locomotion);walker, rolling  -EJ     [place] Level (Gait Training Goal 1, PT) contact guard required  -EJ     Distance (Gait Training Goal 1, PT) 100  -EJ     Time Frame (Gait Training Goal 1, PT) 1 week  -EJ       Row Name 08/14/23 0907          Therapy Assessment/Plan (PT)    Planned Therapy Interventions (PT) balance training;bed mobility training;gait training;home exercise program;stretching;strengthening;stair training;ROM (range of motion);patient/family education;transfer training  -EJ               User Key  (r) = Recorded By, (t) = Taken By, (c) = Cosigned By      Initials Name Provider Type    Cindi Xiao, PT Physical Therapist                   Clinical  Impression       Row Name 08/14/23 0902          Pain    Pretreatment Pain Rating 0/10 - no pain  -EJ       Row Name 08/14/23 0902          Plan of Care Review    Plan of Care Reviewed With patient  -EJ     Outcome Evaluation Pt agreeable to PT eval this am. She was admitted to [facility] on 8/11 w vomiting and diarrhea. Pt w hx of pancreatic resection as well as HTN, HLD, and PVD. At baseline, pt lives alone and reports independence w mobility and ADLs. She states she has recently been using walker for ambulation. Today, pt presents w increased weakness, decreased activity tolerance, and overall decreased functional mobility. She was able to transition to EOB w min A. SHe stood w min A using Rwx. Pt attempted several side steps up toward HOB, but is limited by weakness and requested to sit back down. Pt assisted back to supine w min A x 2. Plans for possible procedure tomorrow. Pt is hopeful to return home at [place], but may benefit from SNU pending progress. SHe will continue to benefit from skilled PT to maximize safety, strength, and independence w mobility.  -EJ       Row Name 08/14/23 0902          Therapy Assessment/Plan (PT)    Rehab Potential (PT) good, to achieve stated therapy goals  -EJ     Criteria for Skilled Interventions Met (PT) yes  -EJ     Therapy Frequency (PT) 5 times/wk  -EJ       Row Name 08/14/23 0902          Positioning and Restraints    Pre-Treatment Position in bed  -EJ     Post Treatment Position bed  -EJ     In Bed notified nsg;supine;call light within reach;encouraged to call for assist;exit alarm on;with nsg  -EJ               User Key  (r) = Recorded By, (t) = Taken By, (c) = Cosigned By      Initials Name Provider Type    Cindi Xiao, PT Physical Therapist                   Outcome Measures       Row Name 08/14/23 0907 08/14/23 0859       How much help from another person do you currently need...    Turning from your back to your side while in flat bed without using bedrails? 3  -EJ  4  -PT    Moving from lying on back to sitting on the side of a flat bed without bedrails? 3  -EJ 4  -PT    Moving to and from a bed to a chair (including a wheelchair)? 2  -EJ 3  -PT    Standing up from a chair using your arms (e.g., wheelchair, bedside chair)? 3  -EJ 2  -PT    Climbing 3-5 steps with a railing? 1  -EJ 2  -PT    To walk in hospital room? 2  -EJ 2  -PT    AM-PAC 6 Clicks Score (PT) 14  -EJ 17  -PT    Highest level of mobility 4 --> Transferred to chair/commode  -EJ 5 --> Static standing  -PT              User Key  (r) = Recorded By, (t) = Taken By, (c) = Cosigned By      Initials Name Provider Type    EJ Cindi De Dios, PT Physical Therapist    PT Kerry Curiel, RN Registered Nurse                                   PT Recommendation and Plan  Planned Therapy Interventions (PT): balance training, bed mobility training, gait training, home exercise program, stretching, strengthening, stair training, ROM (range of motion), patient/family education, transfer training  Plan of Care Reviewed With: patient  Outcome Evaluation: Pt agreeable to PT eval this am. She was admitted to [facility] on 8/11 w vomiting and diarrhea. Pt w hx of pancreatic resection as well as HTN, HLD, and PVD. At baseline, pt lives alone and reports independence w mobility and ADLs. She states she has recently been using walker for ambulation. Today, pt presents w increased weakness, decreased activity tolerance, and overall decreased functional mobility. She was able to transition to EOB w min A. SHe stood w min A using Rwx. Pt attempted several side steps up toward HOB, but is limited by weakness and requested to sit back down. Pt assisted back to supine w min A x 2. Plans for possible procedure tomorrow. Pt is hopeful to return home at [place], but may benefit from SNU pending progress. SHe will continue to benefit from skilled PT to maximize safety, strength, and independence w mobility.     Time Calculation:         PT Charges        Row Name 08/14/23 0909             Time Calculation    Start Time 0833  -EJ      Stop Time 0854  -EJ      Time Calculation (min) 21 min  -EJ      PT Received On 08/14/23  -EJ      PT - Next Appointment 08/15/23  -EJ      PT Goal Re-Cert Due Date 08/21/23  -EJ         Time Calculation- PT    Total Timed Code Minutes- PT 15 minute(s)  -EJ                User Key  (r) = Recorded By, (t) = Taken By, (c) = Cosigned By      Initials Name Provider Type    Cindi Xiao, PT Physical Therapist                  Therapy Charges for Today       Code Description Service Date Service Provider Modifiers Qty    64999293599 HC PT EVAL MOD COMPLEXITY 3 8/14/2023 Cindi De Dios, PT GP 1    23430023528 HC PT THER PROC EA 15 MIN 8/14/2023 Cindi De Dios, PT GP 1            PT G-Codes  AM-PAC 6 Clicks Score (PT): 14  PT Discharge Summary  Anticipated Discharge Disposition (PT): home with assist, home with home health, skilled nursing facility    Cindi De Dios, PT  8/14/2023

## 2023-08-14 NOTE — NURSING NOTE
08/14/23 0921   Wound 08/12/23 medial sacral spine Pressure Injury   Placement Date: 08/12/23   Present on Hospital Admission: Yes  Orientation: medial  Location: sacral spine  Primary Wound Type: (c) Pressure Injury   Pressure Injury Stage 1   Dressing Appearance dry;intact   Base non-blanchable;red   Periwound intact;blanchable   Edges irregular   Drainage Amount none   Wound 08/14/23 Left lower calf    Placement Date: 08/14/23   Present on Hospital Admission: Yes  Side: Left  Orientation: lower  Location: calf  Primary Wound Type: (c)    Dressing Appearance dry;intact   Base clean;red   Periwound intact   Edges irregular   Wound Length (cm) 3 cm   Wound Width (cm) 3 cm   Wound Surface Area (cm^2) 9 cm^2   Drainage Amount none   Wound 08/14/23 Right upper calf    Placement Date: 08/14/23   Present on Hospital Admission: Yes  Side: Right  Orientation: upper  Location: calf  Primary Wound Type: (c)    Dressing Appearance dry;intact   Base clean;red;yellow   Periwound intact   Edges irregular   Wound Length (cm) 1 cm   Wound Width (cm) 1 cm   Wound Surface Area (cm^2) 1 cm^2   Drainage Amount none     WOCN consult: Bilateral feet discoloration purple cool to touch. Bilateral heels red slow to nadeem. Complaints of discomfort when heels are touch. Patient has non draining blister right and left calf. Silicone border dressings in place. Coccyx stage 1 Silicone border dressing in place. Patient needs assistance with repositioning. Discussed with unit RN to apply heel boots. Low air loss mattress has been ordered from [employer]. Bed frame ordered from [employer]. Wound and skin prevention orders placed in epic. Please call if any further needs.

## 2023-08-14 NOTE — PROGRESS NOTES
"   LOS: 0 days     Chief Complaint/ Reason for encounter: JUANI, acidosis and electrolyte abnormalities    Subjective   08/13/23 : She feels about the same today, having some diarrhea, still very weak  Denies shortness of breath or chest pain  No fevers or chills  Oral intake low but no nausea or vomiting    8/14: No new complaints  Stress test underway  No shortness of breath or chest pain    Medical history reviewed:  History of Present Illness    Subjective    History taken from: Patient and chart    Vital Signs  Temp:  [97.3 øF (36.3 øC)-98.9 øF (37.2 øC)] 97.3 øF (36.3 øC)  Heart Rate:  [103-113] 107  Resp:  [16-18] 18  BP: ()/() 128/113       Wt Readings from Last 1 Encounters:   08/13/23 1353 44 kg (97 lb)   08/11/23 2017 44 kg (97 lb)       Objective:  Vital signs: (most recent): Blood pressure (!) 128/113, pulse 107, temperature 97.3 øF (36.3 øC), temperature source Oral, resp. rate 18, height 152.4 cm (60\"), weight 44 kg (97 lb), SpO2 97 %.              Objective:  General Appearance:  Comfortable, thin, somewhat cachectic, chronically ill-appearing, in no acute distress and not in pain.  Awake, alert, oriented  HEENT: Mucous membranes moist, no injury, oropharynx clear  Lungs:  Normal effort and normal respiratory rate.  Breath sounds clear to auscultation.  No  respiratory distress.  No rales, decreased breath sounds or rhonchi.    Heart: Normal rate.  Regular rhythm.  S1, S2 normal.  No murmur.   Abdomen: Abdomen is soft.  Bowel sounds are normal, no abdominal tenderness.  There is no rebound or guarding  Extremities: No significant edema of bilateral lower extremities, Thin, cachectic extremities  Neurological: No focal motor or sensory deficits, pupils reactive  Skin:  Warm and dry.  No rash or cyanosis.       Results Review:    Intake/Output:     Intake/Output Summary (Last 24 hours) at 8/14/2023 1158  Last data filed at 8/13/2023 2000  Gross per 24 hour   Intake 120 ml   Output --   Net " 120 ml           DATA:  Radiology and Labs:  The following labs independently reviewed by me. Additional labs ordered for tomorrow a.m.  Interval notes, chart personally reviewed by me.   Old records independently reviewed showing baseline creatinine looks to be around 0.6 though creatinine was 1.2 about a month ago  Discussed with patient and her nurse at bedside    Risk/ complexity of medical care/ medical decision making: Moderate complexity, severe electrolyte abnormalities    Labs:   Recent Results (from the past 24 hour(s))   Calcium, Ionized    Collection Time: 08/13/23 12:13 PM    Specimen: Blood   Result Value Ref Range    Ionized Calcium 0.89 (L) 1.15 - 1.35 mmol/L    Ionized Calcium 3.6 (L) 4.6 - 5.4 mg/dL   Adult Transthoracic Echo Complete W/ Cont if Necessary Per Protocol    Collection Time: 08/13/23  3:33 PM   Result Value Ref Range    EF(MOD-bp) 60.4 %    LVIDd 3.7 cm    LVIDs 2.9 cm    IVSd 0.53 cm    LVPWd 0.50 cm    FS 20.7 %    IVS/LVPW 1.07 cm    ESV(cubed) 25.4 ml    LV Sys Vol (BSA corrected) 13.1 cm2    EDV(cubed) 51.0 ml    LV Villa Vol (BSA corrected) 29.1 cm2    LVOT area 2.8 cm2    LV mass(C)d 46.6 grams    LVOT diam 1.89 cm    EDV(MOD-sp2) 29.0 ml    EDV(MOD-sp4) 40.0 ml    ESV(MOD-sp2) 11.0 ml    ESV(MOD-sp4) 18.0 ml    SV(MOD-sp2) 18.0 ml    SV(MOD-sp4) 22.0 ml    SI(MOD-sp2) 13.1 ml/m2    SI(MOD-sp4) 16.0 ml/m2    EF(MOD-sp2) 62.1 %    EF(MOD-sp4) 55.0 %    MV E max rodney 86.0 cm/sec    MV A max rodney 113.1 cm/sec    MV dec time 105.00 msec    MV E/A 0.76     Pulm A Revs Dur 0.12 sec    MV A dur 0.18 sec    LA ESV Index (BP) 19.4 ml/m2    Med Peak E' Rodney 4.8 cm/sec    Lat Peak E' Rodney 6.7 cm/sec    Avg E/e' ratio 14.96     SV(LVOT) 32.2 ml    SV(RVOT) 17.9 ml    Qp/Qs 0.56     RV Base 2.5 cm    RV Mid 1.67 cm    RV Length 5.5 cm    RV S' 15.3 cm/sec    Pulm Sys Rodney 62.6 cm/sec    Pulm Villa Rodney 48.4 cm/sec    Pulm S/D 1.29     Pulm A Revs Rodney 51.4 cm/sec    LV V1 max 86.4 cm/sec    LV V1 max  PG 3.0 mmHg    LV V1 mean PG 1.43 mmHg    LV V1 VTI 11.5 cm    Ao pk aakash 117.9 cm/sec    Ao max PG 5.6 mmHg    Ao mean PG 2.6 mmHg    Ao V2 VTI 18.6 cm    LISA(I,D) 1.74 cm2    MV max PG 6.9 mmHg    MV mean PG 2.24 mmHg    MV V2 VTI 17.7 cm    MV P1/2t 31.6 msec    MVA(P1/2t) 7.0 cm2    MVA(VTI) 1.82 cm2    MV dec slope 559.6 cm/sec2    TR max aakash 231.3 cm/sec    TR max PG 21.4 mmHg    RVSP(TR) 24.4 mmHg    RAP systole 3.0 mmHg    RVOT diam 1.63 cm    RV V1 max PG 1.40 mmHg    RV V1 max 59.1 cm/sec    RV V1 VTI 8.6 cm    PA V2 max 95.0 cm/sec    PA acc time 0.08 sec    Sinus 2.9 cm    TAPSE (>1.6) 1.60 cm    LA ESV (BP) 27.0 ml    Dimensionless Index 0.60 (DI)   Renal Function Panel    Collection Time: 08/13/23  5:56 PM    Specimen: Blood   Result Value Ref Range    Glucose 124 (H) 65 - 99 mg/dL    BUN 20 8 - 23 mg/dL    Creatinine 1.02 (H) 0.57 - 1.00 mg/dL    Sodium 147 (H) 136 - 145 mmol/L    Potassium 3.1 (L) 3.5 - 5.2 mmol/L    Chloride 109 (H) 98 - 107 mmol/L    CO2 28.4 22.0 - 29.0 mmol/L    Calcium 6.2 (L) 8.6 - 10.5 mg/dL    Albumin 1.9 (L) 3.5 - 5.2 g/dL    Phosphorus 1.4 (C) 2.5 - 4.5 mg/dL    Anion Gap 9.6 5.0 - 15.0 mmol/L    BUN/Creatinine Ratio 19.6 7.0 - 25.0    eGFR 56.1 (L) >60.0 mL/min/1.73   Magnesium    Collection Time: 08/13/23  5:56 PM    Specimen: Blood   Result Value Ref Range    Magnesium 2.1 1.6 - 2.4 mg/dL   Magnesium    Collection Time: 08/14/23  5:28 AM    Specimen: Blood   Result Value Ref Range    Magnesium 1.8 1.6 - 2.4 mg/dL   Phosphorus    Collection Time: 08/14/23  5:28 AM    Specimen: Blood   Result Value Ref Range    Phosphorus 3.4 2.5 - 4.5 mg/dL   Basic Metabolic Panel    Collection Time: 08/14/23  5:28 AM    Specimen: Blood   Result Value Ref Range    Glucose 72 65 - 99 mg/dL    BUN 15 8 - 23 mg/dL    Creatinine 0.78 0.57 - 1.00 mg/dL    Sodium 149 (H) 136 - 145 mmol/L    Potassium 4.8 3.5 - 5.2 mmol/L    Chloride 114 (H) 98 - 107 mmol/L    CO2 25.7 22.0 - 29.0 mmol/L     Calcium 5.8 (C) 8.6 - 10.5 mg/dL    BUN/Creatinine Ratio 19.2 7.0 - 25.0    Anion Gap 9.3 5.0 - 15.0 mmol/L    eGFR 77.4 >60.0 mL/min/1.73   CBC Auto Differential    Collection Time: 08/14/23  5:28 AM    Specimen: Blood   Result Value Ref Range    WBC 23.74 (H) 3.40 - 10.80 10*3/mm3    RBC 3.50 (L) 3.77 - 5.28 10*6/mm3    Hemoglobin 10.5 (L) 12.0 - 15.9 g/dL    Hematocrit 31.0 (L) 34.0 - 46.6 %    MCV 88.6 79.0 - 97.0 fL    MCH 30.0 26.6 - 33.0 pg    MCHC 33.9 31.5 - 35.7 g/dL    RDW 13.6 12.3 - 15.4 %    RDW-SD 44.4 37.0 - 54.0 fl    MPV 9.4 6.0 - 12.0 fL    Platelets 218 140 - 450 10*3/mm3    nRBC 0.0 0.0 - 0.2 /100 WBC   Manual Differential    Collection Time: 08/14/23  5:28 AM    Specimen: Blood   Result Value Ref Range    Neutrophil % 98.0 (H) 42.7 - 76.0 %    Lymphocyte % 1.0 (L) 19.6 - 45.3 %    Monocyte % 1.0 (L) 5.0 - 12.0 %    Neutrophils Absolute 23.27 (H) 1.70 - 7.00 10*3/mm3    Lymphocytes Absolute 0.24 (L) 0.70 - 3.10 10*3/mm3    Monocytes Absolute 0.24 0.10 - 0.90 10*3/mm3    Hypochromia Mod/2+ None Seen    WBC Morphology Normal Normal    Platelet Morphology Normal Normal       Radiology:  Pertinent radiology studies were reviewed as described above      Medications have been reviewed separately in chart overview      ASSESSMENT:  ARF, prerenal and significantly improved.  Creatinine near baseline today  Hypokalemia, much better after replacement  Metabolic acidosis, largely resolved after bicarb drip  Vomiting with diarrhea, improved  HTN  hypomagnesemia  hypocalcemia, confirmed by ionized calcium.  Replacing orally and IV           PLAN:   Renal function continues to improve and is near baseline today  Discontinue IV fluids  Recheck sodium in a.m.  Encourage oral fluid intake  Replace calcium orally and IV and recheck ionized calcium in a.m.    Will continue to leave off arb at this time   On norvasc for HTN and BP near goal.  I am not sure her diastolic reading was accurate this morning     Monitor  electrolytes and volume closely   Limit IV dye, NSAIDS and nephrotoxic medications   Please call with any questions or concerns.       Isaac Patel MD  [facility]   Office phone number: [phone]  Answering service phone number: [phone]    08/14/23  11:58 EDT    Dictation performed using Dragon dictation software

## 2023-08-14 NOTE — PROGRESS NOTES
Name: Lavern Mcduffie ADMIT: 2023   : 1945  PCP: Ellen Hayes MD    MRN: [id number] LOS: 0 days   AGE/SEX: 77 y.o. female  ROOM: [address]    [place], Subsequent Hospital Care    Chief Complaint   Patient presents with    Abdominal Pain    Diarrhea    Vomiting     CC: Chronic mesenteric ischemia  Subjective     77 y.o. female with chronic mesenteric ischemia.  Her duplex scan shows her celiac artery be widely patent but for SMA is occluded.  I suspect that she is symptomatic because her Whipple procedure took the collaterals that were keeping her mesentery perfusion through the celiac artery.  At this point in time it is unclear if we can open her SMA but an attempt will be made tomorrow.  She also has severe peripheral vascular disease and if there is any iliac disease that is easily treatable at the time of her arteriogram we will try to look at that as well.    Review of Systems patient's abdominal pain is unchanged    Objective     Scheduled Medications:   aspirin, 81 mg, Oral, Daily  azithromycin, 500 mg, Oral, Q24H  calcium gluconate, 1,000 mg, Intravenous, Q1H   Followed by  calcium gluconate, 2,000 mg, Intravenous, Q2H  famotidine, 40 mg, Oral, BID AC  piperacillin-tazobactam, 3.375 g, Intravenous, Q8H  calcium polycarbophil, 625 mg, Oral, BID  sucralfate, 1 g, Oral, 4x Daily AC & at Bedtime        Active Infusions:  sodium chloride 0.9 % with KCl 20 mEq, 125 mL/hr, Last Rate: 125 mL/hr (23 0757)        As Needed Medications:    Calcium Replacement - Follow Nurse / BPA Driven Protocol    HYDROmorphone    loperamide    Magnesium Standard Dose Replacement - Follow Nurse / BPA Driven Protocol    ondansetron    Phosphorus Replacement - Follow Nurse / BPA Driven Protocol    Potassium Replacement - Follow Nurse / BPA Driven Protocol    [COMPLETED] Insert Peripheral IV **AND** sodium chloride    Vital Signs  Vital Signs Patient Vitals for the past 24 hrs:   BP Temp Temp  src Pulse Resp SpO2   08/14/23 1605 -- -- -- -- -- 94 %   08/14/23 1239 112/73 97.3 øF (36.3 øC) Oral -- 18 94 %   08/14/23 0906 -- -- -- 107 -- --   08/14/23 0740 (!) 128/113 97.3 øF (36.3 øC) Oral 106 18 97 %   08/14/23 0040 120/63 97.3 øF (36.3 øC) Oral 103 16 100 %   08/13/23 2037 94/61 98.1 øF (36.7 øC) Oral 113 16 99 %     Vital Signs (range)  Temp:  [97.3 øF (36.3 øC)-98.1 øF (36.7 øC)] 97.3 øF (36.3 øC)  Heart Rate:  [103-113] 107  Resp:  [16-18] 18  BP: ()/() 112/73  I/O:  I/O last 3 completed shifts:  In: 390 [P.O.:390]  Out: -   I/O:   Intake/Output Summary (Last 24 hours) at 8/14/2023 1658  Last data filed at 8/13/2023 2000  Gross per 24 hour   Intake 120 ml   Output --   Net 120 ml     BMI:  Body mass index is 18.94 kg/mý.    Physical Exam:  Physical Exam   Lungs coarse and diminished  Abdomen tender nondistended  Extremities cyanotic and left leg cool.    Results Review:     CBC    Results from last 7 days   Lab Units 08/14/23  0528 08/13/23  0439 08/12/23  0637 08/11/23 2029   WBC 10*3/mm3 23.74* 25.98* 24.05* 22.49*   HEMOGLOBIN g/dL 10.5* 10.6* 12.4 12.0   PLATELETS 10*3/mm3 218 231 284 279     BMP   Results from last 7 days   Lab Units 08/14/23  0528 08/13/23  1756 08/13/23  0532 08/12/23  1625 08/11/23 2029   SODIUM mmol/L 149* 147* 143 144 134*   POTASSIUM mmol/L 4.8 3.1* 2.2* 3.3* 3.2*   CHLORIDE mmol/L 114* 109* 107 112* 100   CO2 mmol/L 25.7 28.4 27.0 14.0* 11.4*   BUN mg/dL 15 20 27* 31* 32*   CREATININE mg/dL 0.78 1.02* 1.21* 1.53* 1.45*   GLUCOSE mg/dL 72 124* 238* 131* 88   MAGNESIUM mg/dL 1.8 2.1 1.4*  --   --    PHOSPHORUS mg/dL 3.4 1.4*  --   --   --      Cr Clearance Estimated Creatinine Clearance: 40.6 mL/min (by C-G formula based on SCr of 0.78 mg/dL).  Coag     HbA1C   Lab Results   Component Value Date    HGBA1C 5.10 08/13/2023    HGBA1C 4.2 (L) 08/22/2022    HGBA1C 5.40 02/05/2021     Blood Glucose No results found for: POCGLU  Infection   Results from last 7 days    Lab Units 08/11/23  2130 08/11/23  2101   BLOODCX  No growth at 2 days No growth at 2 days     CMP   Results from last 7 days   Lab Units 08/14/23  0528 08/13/23  1756 08/13/23  0532 08/12/23  1625 08/11/23 2029   SODIUM mmol/L 149* 147* 143 144 134*   POTASSIUM mmol/L 4.8 3.1* 2.2* 3.3* 3.2*   CHLORIDE mmol/L 114* 109* 107 112* 100   CO2 mmol/L 25.7 28.4 27.0 14.0* 11.4*   BUN mg/dL 15 20 27* 31* 32*   CREATININE mg/dL 0.78 1.02* 1.21* 1.53* 1.45*   GLUCOSE mg/dL 72 124* 238* 131* 88   ALBUMIN g/dL  --  1.9* 1.7* 2.0* 2.0*   BILIRUBIN mg/dL  --   --  0.3 0.2 0.4   ALK PHOS U/L  --   --  157* 180* 186*   AST (SGOT) U/L  --   --  29 37* 44*   ALT (SGPT) U/L  --   --  19 22 22   LIPASE U/L  --   --   --   --  12*     Radiology(recent) No radiology results for the last day    Assessment & Plan     Assessment & Plan      Vomiting and diarrhea    Hypokalemia    Hypomagnesemia      77 y.o. female with chronic mesenteric ischemia and known severe peripheral vascular disease.  Intervention of the mesenteric ischemia which borders on acute is requested.  Its not clear if we will be able to open the SMA which appears occluded on duplex.  I suspect her symptoms occurred after her Whipple because the collateral beds coming from the celiac which is widely patent to the SMA were violated by the Whipple procedure.  Her outlook is very poor if we can open the SMA as I do not believe she would tolerate an open mesenteric revascularization.  Her lower extremities are also concerning as the nursing is called and asked us to take another look at them.  Overall outlook here is difficult to define but I suspect if we can successfully open the SMA then her mesenteric perfusion should be improved.  Her ability to reperfuse her legs will need further evaluation.  We will keep her n.p.o. after midnight and she is aware and agreeable with the plans for intervention thank      Anthony Smalls MD  08/14/23  09:05 EDT    Please call my  office with any question: [phone]    Active Hospital Problems    Diagnosis  POA    **Vomiting and diarrhea [R11.10, R19.7]  Yes    Hypokalemia [E87.6]  Unknown    Hypomagnesemia [E83.42]  Unknown      Resolved Hospital Problems   No resolved problems to display.

## 2023-08-14 NOTE — PLAN OF CARE
Goal Outcome Evaluation:            Pt resting in bed, Aox4 , 2 L NC, ST on tele.  K and Phosp replacement completed. 2 liquid BM noted this shift . Turn q2 . IVF infusing . Nursing will continue to monitor.

## 2023-08-15 NOTE — PROGRESS NOTES
Discharge Planning Assessment  [facility]     Patient Name: Lavern Mcduffie  MRN: [id number]  Today's Date: 8/15/2023    Admit Date: 8/11/2023    Plan: tbd   Discharge Needs Assessment       Row Name 08/15/23 1407       Living Environment    People in Home alone    Current Living Arrangements home    Potentially Unsafe Housing Conditions none    Primary Care Provided by self    Provides Primary Care For no one    Family Caregiver if Needed child(tessa), adult    Family Caregiver Names son, José Miguel and daughter, Sangita    Quality of Family Relationships helpful;involved;supportive    Able to Return to Prior Arrangements yes       Resource/Environmental Concerns    Resource/Environmental Concerns none       Transition Planning    Patient/Family Anticipates Transition to home;inpatient rehabilitation facility    Patient/Family Anticipated Services at Transition home health care;skilled nursing;rehabilitation services    Transportation Anticipated family or friend will provide       Discharge Needs Assessment    Equipment Currently Used at Home wheelchair;walker, standard    Concerns to be Addressed adjustment to diagnosis/illness    Discharge Facility/Level of Care Needs nursing facility, skilled;home with home health    Provided Post Acute Provider List? N/A    Provided Post Acute Provider Quality & Resource List? N/A                   Discharge Plan       Row Name 08/15/23 1410       Plan    Plan tbd    Plan Comments Met with patient and pt's sonJosé Miguel at the bedside, verified facesheet. Patient lives alone, her son lives nearby and assists as needed. Patient's daughter, Sangita also assist patient if needed. Patient is IADL, she uses a walker and transport chair for long distances. Patient's children assist with transportation. [facility], if HH is needed patient would like to have [facility]. Ideally patient would like to return home with the help of family and HH. If short term rehab is needed first  choice is [facility]. PCP is Dr. Hayes and preferred pharmacy is [facility] on [address]. Epic referral sent to [facility] and [facility]. CCP will follow progress after surgery.                  Continued Care and Services - Admitted Since 8/11/2023       Destination       Service Provider Request Status Selected Services Address Phone Fax Patient Preferred    [facility] Pending - Request Sent N/A [address] [facility] [id number] [phone] --              Home Medical Care       Service Provider Request Status Selected Services Address Phone Fax Patient Preferred    [facility] Pending - Request Sent N/A [address] [facility], [address], [facility] [address] [phone] --                  Expected Discharge Date and Time       Expected Discharge Date Expected Discharge Time    Aug 15, 2023            Demographic Summary    No documentation.                  Functional Status       Row Name 08/15/23 1410       Functional Status    Usual Activity Tolerance moderate       Functional Status, IADL    Medications independent    Meal Preparation independent    Housekeeping assistive equipment    Laundry independent    Shopping assistive equipment and person       Mental Status    General Appearance WDL WDL       Mental Status Summary    Recent Changes in Mental Status/Cognitive Functioning no changes                   Psychosocial    No documentation.                  Abuse/Neglect    No documentation.                  Legal    No documentation.                  Substance Abuse    No documentation.                  Patient Forms    No documentation.                     Bekah Coy RN

## 2023-08-15 NOTE — CONSULTS
.            [facility]    Palliative Care Initial Consult   Attending Physician: Nimesh Smith MD  Referring Provider: Dr Smith    Reason for Referral: assistance with clarification of goals of care  Family/Support: children     Code Status and Medical Interventions:   Ordered at: 08/14/23 1412     Medical Intervention Limits:    NO intubation (DNI)    NO cardioversion     Level Of Support Discussed With:    Patient     Code Status (Patient has no pulse and is not breathing):    No CPR (Do Not Attempt to Resuscitate)     Medical Interventions (Patient has pulse or is breathing):    Limited Support     Goals of Care: TBD.    HPI:   77 y.o. female with history of pancreatic cancer s/p Whipple procedure and chemoradiation, peripheral vascular disease, hypertension, and hyperlipidemia.  She resided at home prior to home.   Patient presented to [facility] on 8/11/2023 related to severe abdominal pain, nausea/vomiting, and diarrhea. Workup in ER revealed acute kidney injury and probable ischemic bowel. Consults were placed for nephrology and  gastroenterology initially.  Further lab workup obtained revealed e-coli and lactoferrin. She was started on azithromycin. She went for mesenteric doppler which revealed SMA occlusion and 70% stenosis of MURIEL. She was evaluated cardiology and vascular surgery. She went for ECHO and EF 60.4% and stress test ok. She is scheduled for arteriogram today with hopes of opening SMA. If unable she is too high risk for open repair due her recent Whipple and fragility. The palliative care team was consulted for support with goals of care.   Palliative Care Spoke With: patient  Quality of life: good   Functional Status: She was able to transition to EOB w min A. SHe stood w min A using Rwx. Pt attempted several side steps up toward HOB, but is limited by weakness and requested to sit back down. Pt assisted back to supine w min A x 2 per PT  notes on 2023  Due to the Palliative Care Topics Discussed: palliative care, goals of care, care options, resuscitation status, and discharge options we will establish an advance care plan.   Advance Care Planning   Advance Care Planning Discussion: see below          Review of Systems   Constitutional: Positive for malaise/fatigue. Negative for decreased appetite.   Cardiovascular:  Negative for chest pain and leg swelling.   Respiratory:  Negative for shortness of breath.    Musculoskeletal:         Left leg pain    Gastrointestinal:  Positive for abdominal pain and diarrhea (intermittent). Negative for nausea.   Neurological:  Positive for weakness.   Psychiatric/Behavioral:  Negative for depression. The patient is not nervous/anxious.      1- Pain Assessment  Pain Location: abdomen  Pain Description: intermittent, aching    Past Medical History:   Diagnosis Date    Arthritis     Hyperlipidemia     Hypertension     Right hip pain      Past Surgical History:   Procedure Laterality Date    COLONOSCOPY      INGUINAL HERNIA REPAIR Left     TONSILLECTOMY      TOTAL HIP ARTHROPLASTY Left     TOTAL HIP ARTHROPLASTY Right 2021    Procedure: RIGHT TOTAL HIP ARTHROPLASTY ANTERIOR WITH HANA TABLE;  Surgeon: Sven Rouse MD;  [facility];  Service: Orthopedics;  Laterality: Right;     Social History     Socioeconomic History    Marital status:    Tobacco Use    Smoking status: Former     Packs/day: 0.50     Years: 30.00     Pack years: 15.00     Types: Cigarettes     Quit date:      Years since quittin.6    Smokeless tobacco: Never   Vaping Use    Vaping Use: Never used   Substance and Sexual Activity    Alcohol use: Yes     Comment: SOCIALLY    Drug use: Never    Sexual activity: Defer     Birth control/protection: Post-menopausal       Current Facility-Administered Medications   Medication Dose Route Frequency Provider Last Rate Last Admin    aspirin chewable tablet 81 mg  81 mg  Oral Daily Nimesh Smith MD   81 mg at 08/15/23 0809    azithromycin (ZITHROMAX) tablet 500 mg  500 mg Oral Q24H Dominik Arnold MD   500 mg at 08/15/23 0809    calcium carbonate (TUMS) chewable tablet 500 mg (200 mg elemental)  2 tablet Oral BID Isaac Patel MD   2 tablet at 08/15/23 0809    Calcium Replacement - Follow Nurse / BPA Driven Protocol   Does not apply PRN Isaac Patel MD        famotidine (PEPCID) tablet 40 mg  40 mg Oral BID AC Dominik Arnold MD   40 mg at 08/15/23 0809    HYDROmorphone (DILAUDID) injection 0.5 mg  0.5 mg Intravenous Q4H PRN Nimesh Smith MD   0.5 mg at 08/15/23 1003    loperamide (IMODIUM) capsule 2 mg  2 mg Oral TID PRN Dominik Arnold MD   2 mg at 08/14/23 1815    Magnesium Standard Dose Replacement - Follow Nurse / BPA Driven Protocol   Does not apply PRN Isaac Patel MD        magnesium sulfate 2g/50 mL (PREMIX) infusion  2 g Intravenous Q2H Nimesh Smith MD   2 g at 08/15/23 1003    ondansetron (ZOFRAN) injection 4 mg  4 mg Intravenous Q6H PRN Nimesh Smith MD   4 mg at 08/13/23 0439    Phosphorus Replacement - Follow Nurse / BPA Driven Protocol   Does not apply PRN Isaac Patel MD        polycarbophil tablet 625 mg  625 mg Oral BID Dominik Arnold MD   625 mg at 08/15/23 0808    Potassium Replacement - Follow Nurse / BPA Driven Protocol   Does not apply PRN Isaac Patel MD        sodium chloride 0.45 % infusion  100 mL/hr Intravenous Continuous Isaac Patel  mL/hr at 08/15/23 1003 100 mL/hr at 08/15/23 1003    sodium chloride 0.9 % flush 10 mL  10 mL Intravenous PRN Godfrey Arrieta MD   10 mL at 08/13/23 0838    sucralfate (CARAFATE) tablet 1 g  1 g Oral 4x Daily AC & at Bedtime Dominik Arnold MD   1 g at 08/15/23 0809    vitamin D (ERGOCALCIFEROL) capsule 50,000 Units  50,000 Units Oral Q7 Days Isaac Patel MD         sodium chloride, 100 mL/hr, Last Rate: 100 mL/hr (08/15/23 1003)         "Calcium Replacement - Follow Nurse / BPA Driven Protocol    HYDROmorphone    loperamide    Magnesium Standard Dose Replacement - Follow Nurse / BPA Driven Protocol    ondansetron    Phosphorus Replacement - Follow Nurse / BPA Driven Protocol    Potassium Replacement - Follow Nurse / BPA Driven Protocol    [COMPLETED] Insert Peripheral IV **AND** sodium chloride    No Known Allergies  Attest that current medications reviewed  including but not limited to prescriptions, over-the counter, herbals and vitamin/mineral/dietary (nutritional) supplements for name, route of administration, type, dose and frequency and are current using all immediate resources available at time of dictation.    No intake or output data in the 24 hours ending 08/15/23 1007    Physical Exam:    Diagnostics: Reviewed  /74 (BP Location: Right arm, Patient Position: Lying)   Pulse 106   Temp 97.9 øF (36.6 øC) (Oral)   Resp 18   Ht 152.4 cm (60\")   Wt 44 kg (97 lb)   SpO2 100%   BMI 18.94 kg/mý     Constitutional:       Appearance: Not in distress. Frail.   Pulmonary:      Effort: Pulmonary effort is normal.      Breath sounds: Normal breath sounds.   Chest:      Comments: Right chest mediport   Cardiovascular:      PMI at left midclavicular line. Normal rate. Regular rhythm.      Murmurs: There is no murmur.   Edema:     Peripheral edema absent.   Abdominal:      General: Bowel sounds are normal.      Palpations: Abdomen is soft.      Tenderness: There is no abdominal tenderness.   Skin:     Comments: Thin and fragile, skin tears noted with kerlix dressing on bilateral forearm  Left toes with cyanotic    Neurological:      Mental Status: Alert and oriented to person, place, and time.   Psychiatric:         Mood and Affect: Mood and affect normal.         Speech: Speech normal.         Behavior: Behavior is cooperative.         Thought Content: Thought content normal.         Cognition and Memory: Cognition normal.         Judgment: " Judgment normal.     Patient status: Disease state: Controlled with current treatments.  Functional status: Palliative Performance Scale Score: Performance 60% based on the following measures: Ambulation: Reduced, Activity and Evidence of Disease: Unable to do hobby or some work, significant evidence of disease, Self-Care: Occasional assist necessary,  Intake: Normal or reduced, LOC: Full or confusion   ECOG Status(1) Restricted in physically strenuous activity, ambulatory and able to do work of light nature.  Nutritional status: Albumin 1.9 on 2023 Body mass index is 18.94 kg/mý.    Family support: The patient receives support from her children..  Advance Directives: Advance Directive Status: Patient has advance directive, copy requested   POA/Healthcare surrogate-son- José Miguel.       Impression/Problem List:    Acute kidney injury   E-coli of stool   Lactoferrin positive  Chronic mesenteric ischemia with SMA occlusion and stenosis of ICA  Severe peripheral vascular disease  Pancreatic cancer s/p   whipple and chemoradiation      Recommendations/Plan:  1. Provide support with goals of care      2.  Palliative care encounter  I spoke with patient regarding goals of care. She has a fairly good understanding of her medical conditions, which we reviewed both acute and chronic. She does have a living will on file and her son, José Miguel is her healthcare surrogate. Of note, her spouse  about one year ago on inpatient palliative care unit. Shortly after her spouse death she was diagnosed with pancreatic cancer. She underwent Whipple procedure and complete chemo and radiation (last treatment 2023). She did have feeding tube after surgery. She reports she was doing fairly well and resided at home alone and independent with ADLs. She does have the support of five children (José Miguel, Ivette, Rainer and  live locally and Sangita lives in [place]). She understands the current risk for upcoming arteriogram and hopeful it will be  successful. Her goal at this time is to be treated for her condition and return to home setting. I did provide information regarding palliative care as well. I will plan to follow up tomorrow to determine outcome of arteriogram. She did have complaints of 10/10 pain in left leg-severe PVD, aching and constant. I informed RN who provided pain medication. She denies anxiety, depression, shortness of breath on assessment. No spiritual concerns identified. I spoke with GIANNA Payne.        Thank you for this consult and allowing us to participate in patient's plan of care. Palliative Care Team will continue to follow patient.     Time spent:45 minutes spent reviewing medical and medication records, assessing and examining patient, discussing with family, answering questions, providing some guidance about a plan and documentation of care, and coordinating care with other healthcare members, with > 50% time spent face to face.       KAIN Schneider  8/15/2023  10:07 EDT

## 2023-08-15 NOTE — ANESTHESIA PROCEDURE NOTES
Airway  Urgency: elective    Date/Time: 8/15/2023 3:31 PM  Airway not difficult    General Information and Staff    Patient location during procedure: OR  Anesthesiologist: Doni Wiggins MD    Indications and Patient Condition  Indications for airway management: airway protection    Preoxygenated: yes  MILS maintained throughout  Mask difficulty assessment: 1 - vent by mask    Final Airway Details  Final airway type: endotracheal airway      Successful airway: ETT  Cuffed: yes   Successful intubation technique: direct laryngoscopy  Endotracheal tube insertion site: oral  Blade: Donavan  Blade size: 3  ETT size (mm): 7.0  Cormack-Lehane Classification: grade I - full view of glottis  Placement verified by: chest auscultation and capnometry   Measured from: lips  ETT/EBT  to lips (cm): 21  Number of attempts at approach: 1  Assessment: lips, teeth, and gum same as pre-op and atraumatic intubation

## 2023-08-15 NOTE — DISCHARGE PLACEMENT REQUEST
"Lavern Mcduffie (77 y.o. Female)       Date of Birth   1945    Social Security Number       Address   [address] [place] [address]    Home Phone   [phone]    MRN   [id number]       Mormonism   Pentecostal    Marital Status                               Admission Date   8/11/23    Admission Type   Emergency    Admitting Provider   Nimesh Smith MD    Attending Provider   Nimesh Smith MD    Department, Room/Bed   [facility] MAIN OR, [facility] Main OR/MAIN OR       Discharge Date       Discharge Disposition       Discharge Destination                                 Attending Provider: Nimesh Smith MD    Allergies: No Known Allergies    Isolation: Contact   Infection: Other (08/14/23)   Code Status: No CPR    Ht: 152.4 cm (60\")   Wt: 44 kg (97 lb)    Admission Cmt: None   Principal Problem: Vomiting and diarrhea [R11.10,R19.7]                   Active Insurance as of 8/11/2023       Primary Coverage       Payor Plan Insurance Group Employer/Plan Group    HUMANA MEDICARE REPLACEMENT HUMANA MEDICARE REPLACEMENT [id number]       Payor Plan Address Payor Plan Phone Number Payor Plan Fax Number Effective Dates    [address] [address] [phone]  1/1/2021 - None Entered    [facility] [id number]         Subscriber Name Subscriber Birth Date Member ID       LAVERN MCDUFFIE 1945 [id number]                     Emergency Contacts        (Rel.) Home Phone Work Phone Mobile Phone    SANJU MCDUFFIE ([facility]) (Son) -- -- [phone]    LAVELL EVERETT (Daughter) [phone] -- --                "

## 2023-08-15 NOTE — ANESTHESIA POSTPROCEDURE EVALUATION
Patient: Lavern Mcduffie    Procedure Summary       Date: 08/15/23 Room / [facility]:  [facility]  [facility]    Anesthesia Start: 1511 Anesthesia Stop: 1712    Procedure: MESENTERIC  ARTERIOGRAM POSSIBLE SUPERIOR MESENTERIC ARTERY  STENT Diagnosis:     Surgeons: Anthony Smalls MD Provider: Doni Wiggins MD    Anesthesia Type: MAC ASA Status: 3            Anesthesia Type: MAC    Vitals  Vitals Value Taken Time   /69 08/15/23 1853   Temp     Pulse 117 08/15/23 1855   Resp 14 08/15/23 1825   SpO2 100 % 08/15/23 1855   Vitals shown include unvalidated device data.        Post Anesthesia Care and Evaluation    Patient location during evaluation: bedside  Patient participation: complete - patient participated  Level of consciousness: awake and alert  Pain management: adequate    Airway patency: patent  Anesthetic complications: No anesthetic complications  PONV Status: controlled  Cardiovascular status: blood pressure returned to baseline and acceptable  Respiratory status: acceptable  Hydration status: acceptable

## 2023-08-15 NOTE — PLAN OF CARE
Goal Outcome Evaluation:  Plan of Care Reviewed With: patient        Progress: no change  Outcome Evaluation: Patient NPO  for Surgery  this  morning.  Alert  and  oriented.  No  complaints  of  pain voiced.  Has  had  2  loose  stools  this  shift. Incontinent  care per  staff.     Dressing  changed  to  bilateral  arm   skin  tears  and  LLE  . Slight   weeping  noted  to  left arm  wound.  Has  been  Tachycardic  on  the  monitor.  Room  air.   Nursing  will  continue to monitor

## 2023-08-15 NOTE — PROGRESS NOTES
"Daily progress note    Primary care physician      Chief complaint  Doing same with no new issues and going for angiogram     History of present illness  77-year-old white female with history of pancreatic cancer hypertension hyperlipidemia and peripheral vascular disease brought to the emergency room by the family with severe abdominal pain for last 1 week that she is not eating drinking.  Patient also complained of nausea vomiting after eating and has couple of loose stools.  Patient denies any fever chills black stools blood in the stools.  Patient afraid of eating as it hurts more when she eats.  Patient work-up in ER revealed acute kidney injury and probably has ischemic bowel admitted for management.  Patient is DNR per her wishes.     REVIEW OF SYSTEMS  Unremarkable except generalized weakness     PHYSICAL EXAM  Blood pressure 105/70, pulse 113, temperature 97.8 øF (36.6 øC), temperature source Oral, resp. rate 16, height 152.4 cm (60\"), weight 44 kg (97 lb), SpO2 96 %.    GENERAL: Awake and alert, chronically ill-appearing, cachectic, no acute distress  HENT: nares patent, mucous membranes dry  EYES: no scleral icterus, EOMI  CV: regular rhythm, normal rate, port present right anterior chest wall  RESPIRATORY: normal effort  ABDOMEN: soft,, nondistended, moderate tenderness bowel sounds positive  MUSCULOSKELETAL: no deformity  NEURO: alert, moves all extremities, follows commands, cranial nerves II through XII intact, speech fluent and clear  PSYCH:  calm, cooperative  SKIN: warm, dry     LAB RESULTS  Lab Results (last 24 hours)       Procedure Component Value Units Date/Time    Manual Differential [[id number]]  (Abnormal) Collected: 08/15/23 0617    Specimen: Blood Updated: 08/15/23 0724     Neutrophil % 99.0 %      Lymphocyte % 1.0 %      Neutrophils Absolute 20.47 10*3/mm3      Lymphocytes Absolute 0.21 10*3/mm3      RBC Morphology Normal     WBC Morphology Normal     Platelet Morphology Normal    " CBC & Differential [[id number]]  (Abnormal) Collected: 08/15/23 0617    Specimen: Blood Updated: 08/15/23 0724    Narrative:      The following orders were created for panel order CBC & Differential.  Procedure                               Abnormality         Status                     ---------                               -----------         ------                     CBC Auto Differential[[id number]]        Abnormal            Final result                 Please view results for these tests on the individual orders.    CBC Auto Differential [[id number]]  (Abnormal) Collected: 08/15/23 0617    Specimen: Blood Updated: 08/15/23 0724     WBC 20.68 10*3/mm3      RBC 3.33 10*6/mm3      Hemoglobin 10.1 g/dL      Hematocrit 30.1 %      MCV 90.4 fL      MCH 30.3 pg      MCHC 33.6 g/dL      RDW 13.5 %      RDW-SD 44.6 fl      MPV 9.6 fL      Platelets 193 10*3/mm3     Vitamin D,25-Hydroxy [[id number]]  (Abnormal) Collected: 08/15/23 0617    Specimen: Blood Updated: 08/15/23 0708     25 Hydroxy, Vitamin D 14.1 ng/ml     Narrative:      Reference Range for Total Vitamin D 25(OH)     Deficiency <20.0 ng/mL   Insufficiency 21-29 ng/mL   Sufficiency  ng/mL  Toxicity >100 ng/ml      Magnesium [[id number]]  (Abnormal) Collected: 08/15/23 0617    Specimen: Blood Updated: 08/15/23 0653     Magnesium 1.5 mg/dL     Basic Metabolic Panel [[id number]]  (Abnormal) Collected: 08/15/23 0617    Specimen: Blood Updated: 08/15/23 0653     Glucose 79 mg/dL      BUN 14 mg/dL      Creatinine 0.82 mg/dL      Sodium 148 mmol/L      Potassium 3.7 mmol/L      Chloride 112 mmol/L      CO2 26.4 mmol/L      Calcium 7.5 mg/dL      BUN/Creatinine Ratio 17.1     Anion Gap 9.6 mmol/L      eGFR 72.9 mL/min/1.73     Narrative:      GFR Normal >60  Chronic Kidney Disease <60  Kidney Failure <15    The GFR formula is only valid for adults with stable renal function between ages 18 and 70.    Phosphorus [[id number]]  (Normal) Collected: 08/15/23 0617     Specimen: Blood Updated: 08/15/23 0653     Phosphorus 2.9 mg/dL     Blood Culture - Blood, Arm, Left [[id number]]  (Normal) Collected: 08/11/23 2130    Specimen: Blood from Arm, Left Updated: 08/14/23 2146     Blood Culture No growth at 3 days    Narrative:      Less than seven (7) mL's of blood was collected.  Insufficient quantity may yield false negative results.    Blood Culture - Blood, Arm, Right [[id number]]  (Normal) Collected: 08/11/23 2101    Specimen: Blood from Arm, Right Updated: 08/14/23 2115     Blood Culture No growth at 3 days          Imaging Results (Last 24 Hours)       ** No results found for the last 24 hours. **            Current Facility-Administered Medications:     [MAR Hold] aspirin chewable tablet 81 mg, 81 mg, Oral, Daily, Nimesh Smith MD, 81 mg at 08/15/23 0809    azithromycin (ZITHROMAX) tablet 500 mg, 500 mg, Oral, Q24H, Dominik Arnold MD, 500 mg at 08/15/23 0809    [MAR Hold] calcium carbonate (TUMS) chewable tablet 500 mg (200 mg elemental), 2 tablet, Oral, BID, Isaac Patel MD, 2 tablet at 08/15/23 0809    [MAR Hold] Calcium Replacement - Follow Nurse / BPA Driven Protocol, , Does not apply, PRN, Isaac Patel MD    ceFAZolin in dextrose (ANCEF) IVPB solution 2 g, 2 g, Intravenous, Once, Anthony Smalls MD    famotidine (PEPCID) tablet 40 mg, 40 mg, Oral, BID AC, Dominik Arnold MD, 40 mg at 08/15/23 0809    [MAR Hold] HYDROmorphone (DILAUDID) injection 0.5 mg, 0.5 mg, Intravenous, Q4H PRN, Nimesh Smith MD, 0.5 mg at 08/15/23 1003    lactated ringers infusion, 9 mL/hr, Intravenous, Continuous, Armani Vincent MD, Last Rate: 9 mL/hr at 08/15/23 1320, 9 mL/hr at 08/15/23 1320    [MAR Hold] loperamide (IMODIUM) capsule 2 mg, 2 mg, Oral, TID PRN, Dominik Arnold MD, 2 mg at 08/14/23 1815    [MAR Hold] Magnesium Standard Dose Replacement - Follow Nurse / BPA Driven Protocol, , Does not apply, PRN, Isaac Patel MD    metoprolol succinate XL (TOPROL-XL)  24 hr tablet 12.5 mg, 12.5 mg, Oral, Q24H, Savana Sheehan APRN    [MAR Hold] ondansetron (ZOFRAN) injection 4 mg, 4 mg, Intravenous, Q6H PRN, Nimesh Smith MD, 4 mg at 08/13/23 0439    [MAR Hold] Phosphorus Replacement - Follow Nurse / BPA Driven Protocol, , Does not apply, PRN, Isaac Patel MD    [MAR Hold] polycarbophil tablet 625 mg, 625 mg, Oral, BID, Dominik Arnold MD, 625 mg at 08/15/23 0808    Potassium Replacement - Follow Nurse / BPA Driven Protocol, , Does not apply, PRN, Isaac Patel MD    sodium chloride 0.45 % infusion, 100 mL/hr, Intravenous, Continuous, Isaac Patel MD, Last Rate: 100 mL/hr at 08/15/23 1003, 100 mL/hr at 08/15/23 1003    [COMPLETED] Insert Peripheral IV, , , Once **AND** [MAR Hold] sodium chloride 0.9 % flush 10 mL, 10 mL, Intravenous, PRN, Godfrey Arrieta MD, 10 mL at 08/13/23 0838    sodium chloride 0.9 % flush 3 mL, 3 mL, Intravenous, Q12H, Armani Vincent MD    sodium chloride 0.9 % flush 3-10 mL, 3-10 mL, Intravenous, PRN, Armani Vincent MD    [MAR Hold] sucralfate (CARAFATE) tablet 1 g, 1 g, Oral, 4x Daily AC & at Bedtime, Dominik Arnold MD, 1 g at 08/15/23 0809    [MAR Hold] vitamin D (ERGOCALCIFEROL) capsule 50,000 Units, 50,000 Units, Oral, Q7 Days, Isaac Patel MD     ASSESSMENT  Superior mesenteric artery occlusion  Acute kidney injury resolved  Abdominal pain with nausea vomiting diarrhea enteropathogenic E. coli positive by GI  Hypokalemia hypomagnesemia replaced  Probably ischemic bowel  Nonsustained V. tach  Metabolic acidosis resolved  Peripheral artery disease  History of pancreatic cancer    PLAN  CPM  Continue IVF  Continue IV antibiotics  Angiogram today and possible revascularization per vascular surgery  Symptomatic treatment for abdominal pain nausea vomiting  Gastroenterology nephrology and cardiology to follow patient  Adjust home medications  Stress ulcer DVT prophylaxis  Supportive  care  PT/OT  Discussed with family nursing staff  Follow closely further recommendation current hospital course    GENESIS MATTHEW MD    Copied text in this note has been reviewed and is accurate as of 08/15/23

## 2023-08-15 NOTE — PROGRESS NOTES
"   LOS: 1 day     Chief Complaint/ Reason for encounter: JUANI, acidosis and electrolyte abnormalities    Subjective   08/13/23 : She feels about the same today, having some diarrhea, still very weak  Denies shortness of breath or chest pain  No fevers or chills  Oral intake low but no nausea or vomiting    8/14: No new complaints  Stress test underway  No shortness of breath or chest pain    8/15: No new complaints.  She looks and feels better  No shortness of breath or chest pain  Edema improved.  Scheduled for angiogram today    Medical history reviewed:  History of Present Illness    Subjective    History taken from: Patient and chart    Vital Signs  Temp:  [97.3 øF (36.3 øC)-98.2 øF (36.8 øC)] 97.9 øF (36.6 øC)  Heart Rate:  [106-118] 106  Resp:  [18] 18  BP: (112-130)/(63-78) 126/74       Wt Readings from Last 1 Encounters:   08/13/23 1353 44 kg (97 lb)   08/11/23 2017 44 kg (97 lb)       Objective:  Vital signs: (most recent): Blood pressure 126/74, pulse 106, temperature 97.9 øF (36.6 øC), temperature source Oral, resp. rate 18, height 152.4 cm (60\"), weight 44 kg (97 lb), SpO2 100 %.              Objective:  General Appearance:  Comfortable, thin, somewhat cachectic, chronically ill-appearing, in no acute distress and not in pain.  Awake, alert, oriented  HEENT: Mucous membranes moist, no injury, oropharynx clear  Lungs:  Normal effort and normal respiratory rate.  Breath sounds clear to auscultation.  No  respiratory distress.  No rales, decreased breath sounds or rhonchi.    Heart: Normal rate.  Regular rhythm.  S1, S2 normal.  No murmur.   Abdomen: Abdomen is soft.  Bowel sounds are normal, no abdominal tenderness.  There is no rebound or guarding  Extremities: No significant edema of bilateral lower extremities, Thin, cachectic extremities  Neurological: No focal motor or sensory deficits, pupils reactive  Skin:  Warm and dry.  No rash or cyanosis.       Results Review:    Intake/Output:   No intake or " output data in the 24 hours ending 08/15/23 0804        DATA:  Radiology and Labs:  The following labs independently reviewed by me. Additional labs ordered for tomorrow a.m.  Interval notes, chart personally reviewed by me.   Old records independently reviewed showing baseline creatinine looks to be around 0.6 though creatinine was 1.2 about a month ago  Discussed with patient and her nurse at bedside    Risk/ complexity of medical care/ medical decision making: Moderate complexity, severe electrolyte abnormalities    Labs:   Recent Results (from the past 24 hour(s))   Stress Test With Myocardial Perfusion One Day    Collection Time: 08/14/23 12:34 PM   Result Value Ref Range    BH CV STRESS PROTOCOL 1 Pharmacologic     Stage 1 1.0     Duration Min Stage 1 0     Duration Sec Stage 1 10     Stress Dose Regadenoson Stage 1 0.40     Stress Comments Stage 1 10 sec bolus injection     Baseline  bpm    Baseline /62 mmHg    Peak  bpm    Peak /62 mmHg    Recovery  bpm    Recovery BP 97/61 mmHg    Target HR (85%) 120 bpm    Max. Pred. HR (100%) 141 bpm    Percent Max Pred HR 90.78 %    Percent Target  %    BH CV REST NUCLEAR ISOTOPE DOSE 9.0 mCi    BH CV STRESS NUCLEAR ISOTOPE DOSE 29.0 mCi    Nuc Stress EF 70 %   Magnesium    Collection Time: 08/15/23  6:17 AM    Specimen: Blood   Result Value Ref Range    Magnesium 1.5 (L) 1.6 - 2.4 mg/dL   Phosphorus    Collection Time: 08/15/23  6:17 AM    Specimen: Blood   Result Value Ref Range    Phosphorus 2.9 2.5 - 4.5 mg/dL   Vitamin D,25-Hydroxy    Collection Time: 08/15/23  6:17 AM    Specimen: Blood   Result Value Ref Range    25 Hydroxy, Vitamin D 14.1 (L) 30.0 - 100.0 ng/ml   Basic Metabolic Panel    Collection Time: 08/15/23  6:17 AM    Specimen: Blood   Result Value Ref Range    Glucose 79 65 - 99 mg/dL    BUN 14 8 - 23 mg/dL    Creatinine 0.82 0.57 - 1.00 mg/dL    Sodium 148 (H) 136 - 145 mmol/L    Potassium 3.7 3.5 - 5.2 mmol/L     Chloride 112 (H) 98 - 107 mmol/L    CO2 26.4 22.0 - 29.0 mmol/L    Calcium 7.5 (L) 8.6 - 10.5 mg/dL    BUN/Creatinine Ratio 17.1 7.0 - 25.0    Anion Gap 9.6 5.0 - 15.0 mmol/L    eGFR 72.9 >60.0 mL/min/1.73   CBC Auto Differential    Collection Time: 08/15/23  6:17 AM    Specimen: Blood   Result Value Ref Range    WBC 20.68 (H) 3.40 - 10.80 10*3/mm3    RBC 3.33 (L) 3.77 - 5.28 10*6/mm3    Hemoglobin 10.1 (L) 12.0 - 15.9 g/dL    Hematocrit 30.1 (L) 34.0 - 46.6 %    MCV 90.4 79.0 - 97.0 fL    MCH 30.3 26.6 - 33.0 pg    MCHC 33.6 31.5 - 35.7 g/dL    RDW 13.5 12.3 - 15.4 %    RDW-SD 44.6 37.0 - 54.0 fl    MPV 9.6 6.0 - 12.0 fL    Platelets 193 140 - 450 10*3/mm3   Manual Differential    Collection Time: 08/15/23  6:17 AM    Specimen: Blood   Result Value Ref Range    Neutrophil % 99.0 (H) 42.7 - 76.0 %    Lymphocyte % 1.0 (L) 19.6 - 45.3 %    Neutrophils Absolute 20.47 (H) 1.70 - 7.00 10*3/mm3    Lymphocytes Absolute 0.21 (L) 0.70 - 3.10 10*3/mm3    RBC Morphology Normal Normal    WBC Morphology Normal Normal    Platelet Morphology Normal Normal       Radiology:  Pertinent radiology studies were reviewed as described above      Medications have been reviewed separately in chart overview      ASSESSMENT:  ARF, prerenal and significantly improved.  Creatinine near baseline today  Hypokalemia, much better after replacement  Metabolic acidosis, largely resolved after bicarb drip  Vomiting with diarrhea, improved  HTN  hypomagnesemia  hypocalcemia, confirmed by ionized calcium.  Replacing orally and IV           PLAN:   Renal function remains fairly stable today, near baseline  Magnesium is being replaced IV  Continue oral calcium and add weekly vitamin D  Angiogram planned today so we will gently hydrate in preparation for IV contrast  To hold additional IV diuretics today  Recheck sodium in a.m. encourage oral fluid intake once diet is restarted  BP stable on amlodipine.  Continue to hold ARB       Monitor electrolytes and  volume closely   Please call with any questions or concerns.       Isaac Patel MD  [facility]   Office phone number: [phone]  Answering service phone number: [phone]    08/15/23  08:04 EDT    Dictation performed using Dragon dictation software

## 2023-08-15 NOTE — OP NOTE
Operative Note  Date of Admission:  8/11/2023  OR Date: 8/15/2023    Pre-op Diagnosis:   Chronic mesenteric ischemia    Post-Op Diagnosis Codes:  Same with diffuse peripheral vascular disease    Procedure:   1) duplex directed access with aortoiliofemoral arteriogram and bilateral lower extremity runoff,'s celiac artery selective catheterization and angiogram    Surgeon: Dashawn Smalls MD    Assistant:  Sarah BROWN CSA and they provided critical assistance during the case including suctioning, exposure, retraction, and reduction of blood loss.    Anesthesia: Monitored Anesthesia Care    Staff:   Circulator: Mihai Keating RN  Radiology Technologist: Adwoa Gann, RRT  Scrub Person: Beth Fink; Loree Campbell  Assistant: Sarah Navarro CSA    Estimated Blood Loss: minimal    Specimens: * No orders in the log *     Complications: None    Findings: See dictation    Indications:    The patient is an 77 y.o. female seen for evaluation chronic mesenteric ischemia with symptoms of possible acute mesenteric ischemic changes as well.  Patient is a severe vasculopath and full evaluation of her arterial supply is needed to determine if reperfusion of the legs as well possible.  Angiogram with intervention is planned.       Procedure:    Patient was prepped and draped in a sterile manner.  Using duplex direction we accessed the right common femoral artery above the level of severe plaquing.  This is right at the inguinal ligament.  Stain 5 Macedonian throughout the course of the procedure we used a variety of catheters to perform aortogram and aortoiliofemoral arteriogram repositioning pigtail several times within the aorta and iliac systems.  With runoff views and selective catheterization of the celiac artery performed it was clear that chronic SMA occlusion was not recanalized Boline that she had patent flow through the celiac and the MURIEL without significant stenosis.  Her collateral beds were  extremely poor.  There are multiple clips throughout the abdomen and prior Whipple that she had appears to completely obliterated all of her gastroduodenal collateral beds.  Due to patient's femoral disease no closure device was used.  Catheters were removed and pressure was held.  Patient did tolerate the procedure    Radiographic Findings:  Angiographic findings from the procedure include aortogram showing single renal arteries without significant disease.  The aorta is small in nature but has no significant occlusive disease.  There is atherosclerosis throughout all vessels.  The iliac systems including common internal and external iliac arteries are all widely patent.  Common femorals both have large atheromatous plaques in them that are at least 80% occlusive.  The origin of the SFA on the right is widely patent down through the above-knee popliteal then abruptly occluded with large collaterals around the total occlusion that reconstitutes the tibioperoneal trunk.  On the left there is stenosis at the origin of the SFA as well as a high-grade 90% stenosis at the adductor canal of the SFA popliteal junction.  Then there is a total occlusion of the infrageniculate popliteal with poor reconstitution noted distally.  All of the distal runs were limited by the ability to get the tube down.  Mesenteric views include a AP and laterals with a widely patent celiac artery with no significant stenosis but poor collateralization across the gastroduodenal beds with evidence of prior surgery.  The SMA is occluded at its origin and does not meaningfully reconstitute on lateral views.  Anterior view does show a very distal filling of a superior mesenteric branch.  There is no arc of Riolan or meandering artery seen on multiple imaging sessions.    Active Hospital Problems    Diagnosis  POA    **Vomiting and diarrhea [R11.10, R19.7]  Yes    Hypokalemia [E87.6]  Unknown    Hypomagnesemia [E83.42]  Unknown      Resolved Hospital  Problems   No resolved problems to display.      Anthony Smalls MD     Date: 8/15/2023  Time: 17:21 EDT

## 2023-08-15 NOTE — SIGNIFICANT NOTE
08/15/23 1316   OTHER   Discipline physical therapist   Rehab Time/Intention   Session Not Performed patient unavailable for treatment  (pt off floor to OR for mesentric artery stent, will follow up tomorrow.)   Recommendation   PT - Next Appointment 08/16/23

## 2023-08-15 NOTE — PLAN OF CARE
Goal Outcome Evaluation:           Progress: no change  Outcome Evaluation: Pt AOx4. On RA. NSR-St on monitor-- cardiology added metoprolol. Mag replaced per protocol. Pt went to sx at 1200 and is not back at this time. Plan of care ongoing and will update once back from artiogram    They were unsuccessful with her surgery. Pt returning from recovery. She is to lay flat for 4 hours and then for 2 hours--30 degrees, after that she is okay to sit up.  Palliative sent a message about surgery and that family is coming into to town tomorrow.     Pt remains in pacu. Pt temp low-- will not be here until after shift change- will update pm shift

## 2023-08-15 NOTE — PROGRESS NOTES
[facility]     Progress Note    Patient Name: Lavern Mcduffie  : 1945  MRN: [id number]  Primary Care Physician:  Ellen Hayes MD  Date of admission: 2023    Subjective   Subjective     Chief Complaint: diarrhea , poor appetite    History of Present Illness  Patient Reports  less diarrhea and feels better, poor appetite    Review of Systems   Constitutional:  Positive for unexpected weight change.   Gastrointestinal:  Positive for diarrhea.   Neurological:  Positive for weakness.     Objective   Objective     Vitals:   Temp:  [97.3 øF (36.3 øC)-98.2 øF (36.8 øC)] 97.9 øF (36.6 øC)  Heart Rate:  [106-118] 106  Resp:  [18] 18  BP: (112-130)/() 126/74  Flow (L/min):  [2] 2    Physical Exam  Constitutional:       Appearance: She is ill-appearing.   HENT:      Right Ear: External ear normal.      Left Ear: External ear normal.   Eyes:      Conjunctiva/sclera: Conjunctivae normal.   Cardiovascular:      Pulses: Normal pulses.   Pulmonary:      Effort: Pulmonary effort is normal.   Abdominal:      General: Bowel sounds are normal.   Neurological:      General: No focal deficit present.      Mental Status: She is alert.   Psychiatric:         Mood and Affect: Mood normal.        Result Review    Result Review:  I have personally reviewed the results from the time of this admission to 8/15/2023 06:58 EDT and agree with these findings:  []  Laboratory list / accordion  []  Microbiology  []  Radiology  []  EKG/Telemetry   []  Cardiology/Vascular   []  Pathology  []  Old records  []  Other:  Most notable findings include:       Assessment & Plan   Assessment / Plan     Brief Patient Summary:  Lavern Mcduffie is a 77 y.o. female who   D iarrhea she does have e coli on stool panel  Mesenteric ischemia with superior mesenteric artery stenosis  Weight loss suspect on basis of bacterial overgrowth, mesenteric ischemia  Hx of pancreatic resection    Active Hospital Problems:  Active Hospital Problems     Diagnosis     **Vomiting and diarrhea     Hypokalemia     Hypomagnesemia      Plan: continue azithromycin total of 3 days  Awaiting pancreatic elastase  Appreciate vascular surgery efforts planned procedure to improve mesenteric perfusion       DVT prophylaxis:  Mechanical DVT prophylaxis orders are present.    CODE STATUS:    Medical Intervention Limits: NO intubation (DNI); NO cardioversion  Level Of Support Discussed With: Patient  Code Status (Patient has no pulse and is not breathing): No CPR (Do Not Attempt to Resuscitate)  Medical Interventions (Patient has pulse or is breathing): Limited Support    Disposition:  I expect patient to be discharged few days.    Dominik Arnold MD

## 2023-08-15 NOTE — PROGRESS NOTES
[facility]  Cardiology Progress Note    Patient Identification:  Name: Lavern Mcduffie  Age: 77 y.o.  Sex: female  :  1945  MRN: [id number]                 Follow Up / Chief Complaint: SVT    Interval History: Possible procedure with vascular today.  Her echo revealed EF 60.4%, LV function was indeterminate and RV systolic pressure from TV regurgitation is normal.  Stress test negative.  She denies any chest pain.       Objective:    Past Medical History:  Past Medical History:   Diagnosis Date    Arthritis     Hyperlipidemia     Hypertension     Right hip pain      Past Surgical History:  Past Surgical History:   Procedure Laterality Date    COLONOSCOPY      INGUINAL HERNIA REPAIR Left     TONSILLECTOMY      TOTAL HIP ARTHROPLASTY Left     TOTAL HIP ARTHROPLASTY Right 2021    Procedure: RIGHT TOTAL HIP ARTHROPLASTY ANTERIOR WITH HANA TABLE;  Surgeon: Sven Rouse MD;  Location: [facility];  Service: Orthopedics;  Laterality: Right;        Social History:   Social History     Tobacco Use    Smoking status: Former     Packs/day: 0.50     Years: 30.00     Pack years: 15.00     Types: Cigarettes     Quit date:      Years since quittin.6    Smokeless tobacco: Never   Substance Use Topics    Alcohol use: Yes     Comment: SOCIALLY      Family History:  Family History   Problem Relation Age of Onset    Heart attack Father     Heart disease Father     Malig Hyperthermia Neg Hx           Allergies:  No Known Allergies  Scheduled Meds:  aspirin, 81 mg, Daily  azithromycin, 500 mg, Q24H  calcium carbonate, 2 tablet, BID  famotidine, 40 mg, BID AC  magnesium sulfate, 2 g, Q2H  metoprolol succinate XL, 12.5 mg, Q24H  calcium polycarbophil, 625 mg, BID  sucralfate, 1 g, 4x Daily AC & at Bedtime  vitamin D, 50,000 Units, Q7 Days            INTAKE AND OUTPUT:  No intake or output data in the 24 hours ending 08/15/23 1057      Review of Systems:   GI: No nausea  Cardiac: no chest  pain  Pulmonary: no shob    Constitutional:  Temp:  [97.3 øF (36.3 øC)-98.2 øF (36.8 øC)] 97.9 øF (36.6 øC)  Heart Rate:  [106-118] 106  Resp:  [18] 18  BP: (112-130)/(63-78) 126/74       Physical Exam:  General:  Appears in no acute distress, resting in bed  Eyes: eom normal no conjunctival drainage  HEENT:  No JVD. Thyroid not visibly enlarged. No mucosal pallor or cyanosis  Respiratory: Respirations regular and unlabored at rest. BBS with good air entry in all fields. No crackles, rubs or wheezes auscultated  Cardiovascular: S1S2 tachycardia.  No murmur, rub or gallop. No carotid bruits. DP/PT pulses     . No pretibial pitting edema  Gastrointestinal: Abdomen soft, flat, non tender. Bowel sounds present. No hepatosplenomegaly. No ascites  Skin:   Skin warm and dry to touch. No rashes    Neuro: AAO x3 CN II-XII grossly intact  Psych: Mood and affect normal, pleasant and cooperative          I reviewed the patient's new clinical results, and personally reviewed and interpreted the patient's ECG and telemetry data from the last 24 hours        Cardiographics                   Echocardiogram:     Interpretation Summary         Left ventricular systolic function is normal. Calculated left ventricular EF = 60.4%    Left ventricular diastolic function was indeterminate.    Estimated right ventricular systolic pressure from tricuspid regurgitation is normal (<35 mmHg).     Interpretation Summary       Findings consistent with a normal ECG stress test.  Left ventricular ejection fraction is normal. (Calculated EF = 60%).  Myocardial perfusion imaging indicates a normal myocardial perfusion study with no evidence of ischemia.  Impressions are consistent with a low risk study.     Asymptomatic for chest pain. ECG is negative for ischemia.   Ectopy: rare PVC with exercise, none at baseline and recovery  B/P: Baseline Hypertensive 130/60, Peak (post Lexiscan)128/64 and Recovery:120//64     Pharmacologic study due to  "inability to tolerate increasing speed and grade of treadmill due to orthopedic and  mobility  Issues, Use a rolling walker for ambulation. Unable to walk on treadmill due to c/o right hip pain and poor balance, however was able to perform low level exercise at the chairside. Tolerance was good.     Supervised by: Dr. Doyle  Lab Review       Results from last 7 days   Lab Units 08/15/23  0617   MAGNESIUM mg/dL 1.5*     Results from last 7 days   Lab Units 08/15/23  0617   SODIUM mmol/L 148*   POTASSIUM mmol/L 3.7   BUN mg/dL 14   CREATININE mg/dL 0.82   CALCIUM mg/dL 7.5*       Results from last 7 days   Lab Units 08/15/23  0617 08/14/23  0528 08/13/23  0439   WBC 10*3/mm3 20.68* 23.74* 25.98*   HEMOGLOBIN g/dL 10.1* 10.5* 10.6*   HEMATOCRIT % 30.1* 31.0* 31.1*   PLATELETS 10*3/mm3 193 218 231             Assessment:  Short burst of SVT  Severe hypokalemia: nephrology following. Resolved  Markedly elevated BNP: Echo stable.  Hypertension  Hyperlipidemia  PVD  Pancreatic cancer status post resection a year ago  Left pleural effusion  Hypomagnesia: being replaced by nephology.    Plan:  Sinus tach on the monitor.  I will add metoprolol succinate.  Stress test was negative.  Discussed with the nurse    Ecg in am    )8/15/2023  KAIN Terrell/Transcription:   \"Dictated utilizing Dragon dictation\".     "

## 2023-08-15 NOTE — BRIEF OP NOTE
DIAGNOSTIC ARTERIOGRAM MESENTERIC WITH POSSIBLE STENT PLACEMENT  Progress Note    Lavern Mcduffie  8/15/2023    Pre-op Diagnosis:   Chronic mesenteric ischemia       Post-Op Diagnosis Codes:  Same with diffuse peripheral vascular disease    Procedure/CPTr Codes:        Procedure(s):  Aortoiliofemoral arteriogram with bilateral lower extremity runoff, celiac artery selective arteriogram              Surgeon(s):  Anthony Smalls MD    Anesthesia: Monitored Anesthesia Care    Staff:   Circulator: Mihai Keating RN  Radiology Technologist: Adwoa Gann, COLTEN  Scrub Person: Beth Fink; Loree Campbell  Assistant: Sarah Navarro CSA  Assistant: Sarah Navarro CSA      Estimated Blood Loss: minimal    Urine Voided: * No values recorded between 8/15/2023  3:11 PM and 8/15/2023  5:04 PM *    Specimens:                None          Drains:   Urethral Catheter Straight-tip 16 Fr. (Active)       Findings: See dictation        Complications: None    Assistant: Sarah Navarro CSA  was responsible for performing the following activities:  Wire and catheter management and pressure for hemostasis  and their skilled assistance was necessary for the success of this case.    Anthony Smalls MD     Date: 8/15/2023  Time: 17:16 EDT

## 2023-08-15 NOTE — ANESTHESIA PREPROCEDURE EVALUATION
Anesthesia Evaluation     Patient summary reviewed and Nursing notes reviewed   no history of anesthetic complications:   NPO Solid Status: > 8 hours  NPO Liquid Status: > 2 hours           Airway   Mallampati: II  TM distance: >3 FB  Neck ROM: full  No difficulty expected  Dental      Comment: Edentulous upper    Pulmonary - normal exam   (+) a smoker Former,  Cardiovascular     ECG reviewed  Rhythm: regular  Rate: abnormal    (+) hypertension less than 2 medicationsPVD, hyperlipidemia    ROS comment: EF 60% by ECHO 2 days ago/normal stress test yesterday  PE comment: ST/rate 106-118    Neuro/Psych  GI/Hepatic/Renal/Endo      ROS Comment: SMA occlusion    Musculoskeletal     Abdominal   (+) scaphoid   Substance History      OB/GYN          Other          Other Comment: Pancreatic Ca                  Anesthesia Plan    ASA 3     MAC     (Spoke with patient and family, will suspend no code status intraoperatively.)  intravenous induction     Anesthetic plan, risks, benefits, and alternatives have been provided, discussed and informed consent has been obtained with: patient.      CODE STATUS:    Medical Intervention Limits: NO intubation (DNI); NO cardioversion  Level Of Support Discussed With: Patient  Code Status (Patient has no pulse and is not breathing): No CPR (Do Not Attempt to Resuscitate)  Medical Interventions (Patient has pulse or is breathing): Limited Support

## 2023-08-15 NOTE — PROGRESS NOTES
"Nutrition Services    Patient Name:  Lavern Mcduffie  YOB: 1945  MRN: [id number]  Admit Date:  8/11/2023    FOLLOW UP - CLINICAL NUTRITION    Assessment Date:  08/15/23    Encounter Information         Reason for Encounter Follow up    Current Issues Palliative consult today with plans for follow up tomorrow.  Edema improved.  Arteriogram today.  Less diarrhea.  Poor appetite, 0% x 1 CLD meal per chart review.     Current Nutrition Orders & Evaluation of Intake       Oral Nutrition     Current PO Diet NPO Diet NPO Type: Strict NPO   Supplement n/a   PO Evaluation     % PO Intake 0% x 1 CLD meal    # of Days Evaluated     Factors Affecting Intake  decreased appetite, diarrhea, vomiting   --  Anthropometrics          Height    Weight Height: 152.4 cm (60\")  Weight: 44 kg (97 lb) (08/13/23 1353)    BMI kg/m2 Body mass index is 18.94 kg/mý.  Normal/Healthy (18.4 - 24.9)    Weight trend Loss     Labs        Pertinent Labs Reviewed, listed below     Results from last 7 days   Lab Units 08/15/23  0617 08/14/23  0528 08/13/23  1756 08/13/23  0532 08/12/23  1625 08/11/23 2029   SODIUM mmol/L 148* 149* 147* 143 144 134*   POTASSIUM mmol/L 3.7 4.8 3.1* 2.2* 3.3* 3.2*   CHLORIDE mmol/L 112* 114* 109* 107 112* 100   CO2 mmol/L 26.4 25.7 28.4 27.0 14.0* 11.4*   BUN mg/dL 14 15 20 27* 31* 32*   CREATININE mg/dL 0.82 0.78 1.02* 1.21* 1.53* 1.45*   CALCIUM mg/dL 7.5* 5.8* 6.2* 6.2* 6.5* 7.0*   BILIRUBIN mg/dL  --   --   --  0.3 0.2 0.4   ALK PHOS U/L  --   --   --  157* 180* 186*   ALT (SGPT) U/L  --   --   --  19 22 22   AST (SGOT) U/L  --   --   --  29 37* 44*   GLUCOSE mg/dL 79 72 124* 238* 131* 88     Results from last 7 days   Lab Units 08/15/23  0617 08/14/23  0528 08/13/23  1756   MAGNESIUM mg/dL 1.5* 1.8 2.1   PHOSPHORUS mg/dL 2.9 3.4 1.4*   HEMOGLOBIN g/dL 10.1* 10.5*  --    HEMATOCRIT % 30.1* 31.0*  --    WBC 10*3/mm3 20.68* 23.74*  --    ALBUMIN g/dL  --   --  1.9*     Results from last 7 days   Lab " Units 08/15/23  0617 08/14/23  0528 08/13/23  0439 08/12/23  0637 08/11/23 2029   PLATELETS 10*3/mm3 193 218 231 284 279     SARS-CoV-2 PCR   Date Value Ref Range Status   02/22/2021 Not Detected Not Detected Final     Comment:     Nucleic acid specific to SARS-CoV-2 (COVID-19) virus was not detected in  this sample by the Aptima (R) SARS-CoV-2 Assay.                SARS-CoV-2 (COVID-19) nucleic acid testing performed using [employer] Aptima (R) SARS-CoV-2 Assay or [employer] TaqPath (TM)  COVID-19 Combo Kit as indicated above under Emergency Use Authorization (EUA) from the FDA. Aptima (R) and TaqPath (TM) test performance  characteristics verified by [facility] in accordance with the FDAs Guidance Document (Policy for Diagnostic Tests for Coronavirus Disease-2019  during the Public Health Emergency) issued on March 16, 2020. The laboratory is regulated under CLIA as qualified to perform high-complexity testing  Unless otherwise noted, all testing was performed at [facility], CLIA No. [id number], [place] State Licensee No. [id number]     Lab Results   Component Value Date    HGBA1C 5.10 08/13/2023          Medications            Scheduled Medications [MAR Hold] aspirin, 81 mg, Oral, Daily  azithromycin, 500 mg, Oral, Q24H  [MAR Hold] calcium carbonate, 2 tablet, Oral, BID  ceFAZolin, 2 g, Intravenous, Once  famotidine, 40 mg, Oral, BID AC  metoprolol succinate XL, 12.5 mg, Oral, Q24H  [MAR Hold] calcium polycarbophil, 625 mg, Oral, BID  sodium chloride, 3 mL, Intravenous, Q12H  [MAR Hold] sucralfate, 1 g, Oral, 4x Daily AC & at Bedtime  [MAR Hold] vitamin D, 50,000 Units, Oral, Q7 Days        Infusions lactated ringers, 9 mL/hr, Last Rate: 9 mL/hr (08/15/23 1320)  sodium chloride, 100 mL/hr, Last Rate: 100 mL/hr (08/15/23 1003)        PRN Medications   [MAR Hold] Calcium Replacement - Follow Nurse / BPA Driven Protocol    [MAR Hold] HYDROmorphone    [MAR Hold] loperamide    [MAR Hold] Magnesium Standard  Dose Replacement - Follow Nurse / BPA Driven Protocol    [MAR Hold] ondansetron    [MAR Hold] Phosphorus Replacement - Follow Nurse / BPA Driven Protocol    Potassium Replacement - Follow Nurse / BPA Driven Protocol    [COMPLETED] Insert Peripheral IV **AND** [MAR Hold] sodium chloride    sodium chloride     Physical Findings          Physical Appearance alert, oriented, room air   Oral/Mouth Cavity tooth or teeth missing   Edema  lower extremity , upper extremity, 1+ (trace), 2+ (mild)   Gastrointestinal non-distended , normoactive, last bowel movement: 8/14   Skin  blisters, pressure injury: st I medial sacral spine, location of wound: L calf, R calf   Tubes/Drains/Lines implantable port   NFPE Not indicated at this time   --  NUTRITION INTERVENTION / PLAN OF CARE  Intervention Goal         Intervention Goal(s) Maintain nutrition status, Reduce/improve symptoms, Meet estimated needs, Disease management/therapy, Initiate feeding/diet, and Appropriate weight gain     Nutrition Intervention         RD Action Await begin PO diet, Follow Tx Progress, and Care plan reviewed     Nutrition Prescription         Diet Prescription     Supplement Prescription n/a   EN/PN Prescription    New Prescription Ordered? No changes at this time   --  Monitor/Evaluation        Monitor Per protocol, I&O, Pertinent labs, Weight, Skin status, GI status, Symptoms, POC/GOC   Discharge Needs Pending clinical course   Education Will instruct as appropriate   --    RD to follow up per protocol.    Electronically signed by:  Jen Milton RD  08/15/23 15:26 EDT

## 2023-08-16 NOTE — PROGRESS NOTES
Name: Lavern Mcduffie ADMIT: 2023   : 1945  PCP: Ellen Hayes MD    MRN: [id number] LOS: 2 days   AGE/SEX: 77 y.o. female  ROOM: [address]    [place], Subsequent Hospital Care    Chief Complaint   Patient presents with    Abdominal Pain    Diarrhea    Vomiting     CC: Chronic mesenteric ischemia with severe peripheral vascular disease  Subjective     77 y.o. female with severe peripheral vascular disease and a vasculopath in nature with chronic mesenteric ischemia exacerbated by prior Whipple.  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  Her collateral beds were violated during Whipple procedure.  At this point in time she has been off and on suffering from mesenteric ischemic episodes over the last year that are chronic in nature and have caused fear of food and weight loss.  Unfortunately we were not able to intervene at all on the SMA which is severely occluded and does not reconstitute meaningfully until the distal vessels.  Is not clear to me if she is even really a candidate for open repair but an ileal SMA bypass would be the only option that we could offer and I am not sure she would survive it or that would work given the size of her vessels.  The patient does not seem to want major surgery.    Review of Systems feels poorly today  Objective     Scheduled Medications:   aspirin, 81 mg, Oral, Daily  azithromycin, 500 mg, Oral, Q24H  calcium carbonate, 2 tablet, Oral, BID  cilostazol, 50 mg, Oral, BID AC  famotidine, 40 mg, Oral, BID AC  heparin (porcine), 5,000 Units, Subcutaneous, Q12H  metoprolol succinate XL, 12.5 mg, Oral, Q24H  calcium polycarbophil, 625 mg, Oral, BID  sucralfate, 1 g, Oral, 4x Daily AC & at Bedtime  vitamin D, 50,000 Units, Oral, Q7 Days        Active Infusions:  lactated ringers, 9 mL/hr, Last Rate: 9 mL/hr (08/15/23 1511)        As Needed Medications:    Calcium Replacement -  Follow Nurse / BPA Driven Protocol    diphenhydrAMINE    droperidol **OR** droperidol    ePHEDrine    fentanyl    flumazenil    hydrALAZINE    HYDROcodone-acetaminophen    HYDROcodone-acetaminophen    HYDROcodone-acetaminophen    HYDROmorphone    HYDROmorphone    HYDROmorphone **AND** naloxone    ipratropium-albuterol    labetalol    loperamide    Magnesium Standard Dose Replacement - Follow Nurse / BPA Driven Protocol    naloxone    nitroglycerin    ondansetron    ondansetron    Phosphorus Replacement - Follow Nurse / BPA Driven Protocol    Potassium Replacement - Follow Nurse / BPA Driven Protocol    promethazine **OR** promethazine    [COMPLETED] Insert Peripheral IV **AND** sodium chloride    Vital Signs  Vital Signs Patient Vitals for the past 24 hrs:   BP Temp Temp src Pulse Resp SpO2 Height   08/16/23 0410 -- 98.1 øF (36.7 øC) Axillary 106 -- -- --   08/16/23 0347 126/69 -- -- 106 14 100 % --   08/16/23 0300 115/71 -- -- 111 -- -- --   08/16/23 0230 127/79 -- -- 114 -- -- --   08/16/23 0200 115/71 -- -- 110 -- -- --   08/16/23 0100 -- -- -- 114 -- -- --   08/15/23 2309 100/61 -- -- 119 14 -- --   08/15/23 2300 108/61 97.9 øF (36.6 øC) Axillary 119 -- 100 % --   08/15/23 2245 99/64 -- -- 117 -- 98 % --   08/15/23 2230 105/61 97.9 øF (36.6 øC) Axillary 116 12 98 % --   08/15/23 2215 121/66 -- -- 118 12 97 % --   08/15/23 2200 111/65 -- -- (!) 124 -- 97 % --   08/15/23 2145 93/77 -- -- 120 -- 96 % --   08/15/23 2130 90/66 -- -- 112 -- -- --   08/15/23 2100 91/56 98.1 øF (36.7 øC) Axillary 118 12 95 % --   08/15/23 2015 97/59 97.9 øF (36.6 øC) Axillary 117 8 100 % --   08/15/23 2000 104/62 -- -- 119 12 100 % --   08/15/23 1945 102/53 -- -- 118 -- 98 % --   08/15/23 1944 -- 97.5 øF (36.4 øC) Axillary -- -- -- --   08/15/23 1930 123/62 -- -- 120 -- 96 % --   08/15/23 1853 123/69 -- -- 114 -- 100 % --   08/15/23 1825 95/65 -- -- 116 14 100 % --   08/15/23 1817 95/65 -- -- 106 -- 99 % --   08/15/23 1815 (!) 87/68 --  "-- 107 -- 99 % --   08/15/23 1801 107/57 94 øF (34.4 øC) Oral 105 -- 96 % --   08/15/23 1800 -- -- -- -- -- 99 % --   08/15/23 1740 -- -- -- 113 -- -- --   08/15/23 1725 119/76 -- -- 114 -- 93 % --   08/15/23 1710 143/87 -- -- 110 -- 99 % --   08/15/23 1206 105/70 97.8 øF (36.6 øC) Oral 113 16 96 % 152.4 cm (60\")   08/15/23 1145 95/77 97.5 øF (36.4 øC) Oral 106 18 96 % --     Vital Signs (range)  Temp:  [94 øF (34.4 øC)-98.1 øF (36.7 øC)] 98.1 øF (36.7 øC)  Heart Rate:  [105-124] 106  Resp:  [8-18] 14  BP: ()/(53-87) 126/69  I/O:  I/O last 3 completed shifts:  In: 1250 [I.V.:1250]  Out: 300 [Urine:300]  I/O:   Intake/Output Summary (Last 24 hours) at 8/16/2023 0813  Last data filed at 8/15/2023 2237  Gross per 24 hour   Intake 1250 ml   Output 300 ml   Net 950 ml     BMI:  Body mass index is 18.94 kg/mý.    Physical Exam:  Physical Exam   Abdomen soft and scaphoid without significant tenderness  Right groin clear  Extremities cyanotic at all sites arms and legs    Results Review:     CBC    Results from last 7 days   Lab Units 08/16/23  0600 08/15/23  0617 08/14/23  0528 08/13/23  0439 08/12/23  0637 08/11/23 2029   WBC 10*3/mm3 22.06* 20.68* 23.74* 25.98* 24.05* 22.49*   HEMOGLOBIN g/dL 8.9* 10.1* 10.5* 10.6* 12.4 12.0   PLATELETS 10*3/mm3 195 193 218 231 284 279     BMP   Results from last 7 days   Lab Units 08/16/23  0600 08/15/23  0617 08/14/23  0528 08/13/23  1756 08/13/23  0532 08/12/23  1625 08/11/23 2029   SODIUM mmol/L 145 148* 149* 147* 143 144 134*   POTASSIUM mmol/L 4.0 3.7 4.8 3.1* 2.2* 3.3* 3.2*   CHLORIDE mmol/L 110* 112* 114* 109* 107 112* 100   CO2 mmol/L 22.5 26.4 25.7 28.4 27.0 14.0* 11.4*   BUN mg/dL 15 14 15 20 27* 31* 32*   CREATININE mg/dL 1.17* 0.82 0.78 1.02* 1.21* 1.53* 1.45*   GLUCOSE mg/dL 87 79 72 124* 238* 131* 88   MAGNESIUM mg/dL 2.5* 1.5* 1.8 2.1 1.4*  --   --    PHOSPHORUS mg/dL 4.6* 2.9 3.4 1.4*  --   --   --      Cr Clearance Estimated Creatinine Clearance: 27.1 mL/min (A) " (by C-G formula based on SCr of 1.17 mg/dL (H)).  Coag     HbA1C   Lab Results   Component Value Date    HGBA1C 5.10 08/13/2023    HGBA1C 4.2 (L) 08/22/2022    HGBA1C 5.40 02/05/2021     Blood Glucose No results found for: POCGLU  Infection   Results from last 7 days   Lab Units 08/11/23  2130 08/11/23  2101   BLOODCX  No growth at 4 days No growth at 4 days     CMP   Results from last 7 days   Lab Units 08/16/23  0600 08/15/23  0617 08/14/23  0528 08/13/23  1756 08/13/23  0532 08/12/23  1625 08/11/23 2029   SODIUM mmol/L 145 148* 149* 147* 143 144 134*   POTASSIUM mmol/L 4.0 3.7 4.8 3.1* 2.2* 3.3* 3.2*   CHLORIDE mmol/L 110* 112* 114* 109* 107 112* 100   CO2 mmol/L 22.5 26.4 25.7 28.4 27.0 14.0* 11.4*   BUN mg/dL 15 14 15 20 27* 31* 32*   CREATININE mg/dL 1.17* 0.82 0.78 1.02* 1.21* 1.53* 1.45*   GLUCOSE mg/dL 87 79 72 124* 238* 131* 88   ALBUMIN g/dL 2.6*  --   --  1.9* 1.7* 2.0* 2.0*   BILIRUBIN mg/dL  --   --   --   --  0.3 0.2 0.4   ALK PHOS U/L  --   --   --   --  157* 180* 186*   AST (SGOT) U/L  --   --   --   --  29 37* 44*   ALT (SGPT) U/L  --   --   --   --  19 22 22   LIPASE U/L  --   --   --   --   --   --  12*     Radiology(recent) No radiology results for the last day    Assessment & Plan     Assessment & Plan      Vomiting and diarrhea    Hypokalemia    Hypomagnesemia      77 y.o. female with severe peripheral vascular disease and associated chronic mesenteric ischemia.  Unclear if she has anything that is really reconstructable.  Her mesentery is not amenable to endovascular repair and we are going to add Pletal to see if we can get improvement in the perfusion to her gut as well as her lower extremities.  We will get lower and upper extremity arterial testing to define the amount of disease she has here.  I discussed all this candidly with her and her family on multiple occasions now and have recommended that they fully discuss her CODE STATUS and clarify it with the admitting physicians.  I feel I  have nothing that I can do endovascularly for her abdomen and surgically I do not believe that she would likely survive open procedure.  I have discussed this with the family and they are going to make some decisions with the patient.  We will see what the lower extremities and her upper extremity art show for the hands and feet.    Anthony Smalls MD  08/16/23  08:11 EDT    Please call my office with any question: [phone]    Active Hospital Problems    Diagnosis  POA    **Vomiting and diarrhea [R11.10, R19.7]  Yes    Hypokalemia [E87.6]  Unknown    Hypomagnesemia [E83.42]  Unknown      Resolved Hospital Problems   No resolved problems to display.

## 2023-08-16 NOTE — PERIOPERATIVE NURSING NOTE
Unable to obtain oral temperature from patient while in PACU.     Multiple RNs and multiple rationale and education methods provided to obtain oral temp. Charge nurse on floor and staff RN notified before initial transfer from PACU.    Upon initial transfer to floor, patient experienced great pain from transport and was found to be hypotensive at transfer vitals.     Patient brought back to PACU and given 500mL albumin and 0.25 IV dilaudid. Patient stated pain was controlled and that she was again ready to go to floor.     Throughout second PACU stay patient refused oral temps and offers of any liquid, food, oral pain meds.   Patient maintained axillary temp of 97.9 or above for over 2 hours in PACU. Floor Staff RN aware.

## 2023-08-16 NOTE — PROGRESS NOTES
[facility]  Cardiology Progress Note    Patient Identification:  Name: Lavern Mcduffie  Age: 77 y.o.  Sex: female  :  1945  MRN: [id number]                 Follow Up / Chief Complaint: SVT    Interval History: resting in bed, family at bedside, on 4L nc.     Objective:    Past Medical History:  Past Medical History:   Diagnosis Date    Arthritis     Hyperlipidemia     Hypertension     Right hip pain      Past Surgical History:  Past Surgical History:   Procedure Laterality Date    ARTERIOGRAM MESENTERIC N/A 8/15/2023    Procedure: MESENTERIC  ARTERIOGRAM POSSIBLE SUPERIOR MESENTERIC ARTERY  STENT;  Surgeon: Anthony Smalls MD;  Location: [facility] OR ;  Service: Vascular;  Laterality: N/A;    COLONOSCOPY      INGUINAL HERNIA REPAIR Left     TONSILLECTOMY      TOTAL HIP ARTHROPLASTY Left     TOTAL HIP ARTHROPLASTY Right 2021    Procedure: RIGHT TOTAL HIP ARTHROPLASTY ANTERIOR WITH HANA TABLE;  Surgeon: Sven Rouse MD;  Location: [facility];  Service: Orthopedics;  Laterality: Right;        Social History:   Social History     Tobacco Use    Smoking status: Former     Packs/day: 0.50     Years: 30.00     Pack years: 15.00     Types: Cigarettes     Quit date:      Years since quittin.6    Smokeless tobacco: Never   Substance Use Topics    Alcohol use: Yes     Comment: SOCIALLY      Family History:  Family History   Problem Relation Age of Onset    Heart attack Father     Heart disease Father     Malig Hyperthermia Neg Hx           Allergies:  No Known Allergies  Scheduled Meds:  aspirin, 81 mg, Daily  calcium carbonate, 2 tablet, BID  cilostazol, 50 mg, BID AC  famotidine, 40 mg, BID AC  heparin (porcine), 5,000 Units, Q12H  metoprolol succinate XL, 12.5 mg, Q24H  calcium polycarbophil, 625 mg, BID  sucralfate, 1 g, 4x Daily AC & at Bedtime  vitamin D, 50,000 Units, Q7 Days            INTAKE AND OUTPUT:    Intake/Output Summary (Last 24 hours) at 2023  1054  Last data filed at 8/15/2023 2237  Gross per 24 hour   Intake 1250 ml   Output 300 ml   Net 950 ml         Review of Systems:   GI: no n/v or abd pain  Cardiac: no chest pain or palpitations  Pulmonary: no shortness of breath or cough      Constitutional:  Temp:  [94 øF (34.4 øC)-98.1 øF (36.7 øC)] 97.5 øF (36.4 øC)  Heart Rate:  [] 95  Resp:  [8-18] 16  BP: ()/(53-87) 131/75    Physical Exam:  General:  Appears frail, ill, in no acute distress  Eyes: eom normal no conjunctival drainage  HEENT:  No JVD. Thyroid not visibly enlarged. No mucosal pallor or cyanosis  Respiratory: Respirations regular and unlabored at rest. BBS with good air entry in all fields. No crackles, rubs or wheezes auscultated  Cardiovascular: S1S2 Regular rate and rhythm. No murmur, rub or gallop. No carotid bruits. Cyanotic toes  Gastrointestinal: Abdomen soft, non tender. Bowel sounds present.   Musculoskeletal: BURNETT x4. No abnormal movements  Neuro: AAO x3 CN II-XII grossly intact  Psych: Mood and affect normal, pleasant and cooperative          I reviewed the patient's new clinical results, and personally reviewed and interpreted the patient's ECG and telemetry data from the last 24 hours      Cardiographics    Echocardiogram:     Interpretation Summary         Left ventricular systolic function is normal. Calculated left ventricular EF = 60.4%    Left ventricular diastolic function was indeterminate.    Estimated right ventricular systolic pressure from tricuspid regurgitation is normal (<35 mmHg).     Interpretation Summary       Findings consistent with a normal ECG stress test.  Left ventricular ejection fraction is normal. (Calculated EF = 60%).  Myocardial perfusion imaging indicates a normal myocardial perfusion study with no evidence of ischemia.  Impressions are consistent with a low risk study.     Asymptomatic for chest pain. ECG is negative for ischemia.   Ectopy: rare PVC with exercise, none at baseline and  "recovery  B/P: Baseline Hypertensive 130/60, Peak (post Lexiscan)128/64 and Recovery:120//64     Pharmacologic study due to inability to tolerate increasing speed and grade of treadmill due to orthopedic and  mobility  Issues, Use a rolling walker for ambulation. Unable to walk on treadmill due to c/o right hip pain and poor balance, however was able to perform low level exercise at the chairside. Tolerance was good.     Supervised by: Dr. Doyle  Lab Review       Results from last 7 days   Lab Units 08/16/23  0600   MAGNESIUM mg/dL 2.5*     Results from last 7 days   Lab Units 08/16/23  0600   SODIUM mmol/L 145   POTASSIUM mmol/L 4.0   BUN mg/dL 15   CREATININE mg/dL 1.17*   CALCIUM mg/dL 7.8*       Results from last 7 days   Lab Units 08/16/23  0600 08/15/23  0617 08/14/23  0528   WBC 10*3/mm3 22.06* 20.68* 23.74*   HEMOGLOBIN g/dL 8.9* 10.1* 10.5*   HEMATOCRIT % 26.8* 30.1* 31.0*   PLATELETS 10*3/mm3 195 193 218             Assessment:  Short burst of SVT  Severe hypokalemia: Resolved  Markedly elevated BNP: Echo stable.  Hypertension  Hyperlipidemia  PVD: severe, vascular following  Pancreatic cancer status post resection a year ago  Left pleural effusion  Hypomagnesia: being replaced by nephology.    Plan:  Tele reviewed, SR-ST, continue beta blockade, bp stable  Echo EF 60%, stress test negative  On NC 4L.   DNR  Noted palliative consult       )8/16/2023  Alem Rowe, KAIN Blanco/Transcription:   \"Dictated utilizing Dragon dictation\".     "

## 2023-08-16 NOTE — PROGRESS NOTES
LOS: 2 days   Patient Care Team:  Ellen Hayes MD as PCP - General (Internal Medicine)      Subjective     Interval History: S/p angiogram, unable to place stent    Subjective: She denies new issues.  She had 1 episode of diarrhea yesterday.  She is not eating much and feels weak.    Labs reviewed      ROS:   Review of Systems   Constitutional:  Positive for appetite change and unexpected weight change.   Gastrointestinal:  Positive for abdominal pain and diarrhea.   Neurological:  Positive for weakness.   All other systems reviewed and are negative.       Medication Review:     Current Facility-Administered Medications:     aspirin chewable tablet 81 mg, 81 mg, Oral, Daily, Anthony Smalls MD, 81 mg at 08/15/23 0809    azithromycin (ZITHROMAX) tablet 500 mg, 500 mg, Oral, Q24H, Dominik Arnold MD, 500 mg at 08/15/23 0809    calcium carbonate (TUMS) chewable tablet 500 mg (200 mg elemental), 2 tablet, Oral, BID, Anthony Smalls MD, 2 tablet at 08/15/23 0809    Calcium Replacement - Follow Nurse / BPA Driven Protocol, , Does not apply, PRN, Anthony Smalls MD    cilostazol (PLETAL) tablet 50 mg, 50 mg, Oral, BID AC, Anthony Smalls MD, 50 mg at 08/16/23 0658    diphenhydrAMINE (BENADRYL) injection 12.5 mg, 12.5 mg, Intravenous, Q15 Min PRN, Maykel Wharton MD    droperidol (INAPSINE) injection 0.625 mg, 0.625 mg, Intravenous, Q20 Min PRN **OR** droperidol (INAPSINE) injection 0.625 mg, 0.625 mg, Intramuscular, Q20 Min PRN, Maykel Wharton MD    ePHEDrine injection 5 mg, 5 mg, Intravenous, Once PRN, Maykel Wharton MD    famotidine (PEPCID) tablet 40 mg, 40 mg, Oral, BID Slim LOPEZ Matthew T, MD, 40 mg at 08/16/23 0658    fentaNYL citrate (PF) (SUBLIMAZE) injection 25 mcg, 25 mcg, Intravenous, Q5 Min PRN, Maykel Wharton MD    flumazenil (ROMAZICON) injection 0.2 mg, 0.2 mg, Intravenous, PRN, Maykel Wharton MD    heparin (porcine) 5000 UNIT/ML injection 5,000 Units, 5,000  Units, Subcutaneous, Q12H, Anthony Smalls MD    hydrALAZINE (APRESOLINE) injection 5 mg, 5 mg, Intravenous, Q10 Min PRN, Maykel Wharton MD    HYDROcodone-acetaminophen (NORCO) 5-325 MG per tablet 1 tablet, 1 tablet, Oral, Once PRN, Maykel Wharton MD    HYDROcodone-acetaminophen (NORCO) 7.5-325 MG per tablet 1 tablet, 1 tablet, Oral, Q4H PRN, Anthony Smalls MD, 1 tablet at 08/16/23 0701    HYDROcodone-acetaminophen (NORCO) 7.5-325 MG per tablet 1 tablet, 1 tablet, Oral, Q4H PRN, Maykel Wharton MD    HYDROmorphone (DILAUDID) injection 0.25 mg, 0.25 mg, Intravenous, Q5 Min PRN, Maykel Wharton MD, 0.25 mg at 08/15/23 2211    HYDROmorphone (DILAUDID) injection 0.5 mg, 0.5 mg, Intravenous, Q4H PRN, Anthony Smalls MD, 0.5 mg at 08/15/23 1003    HYDROmorphone (DILAUDID) injection 0.5 mg, 0.5 mg, Intravenous, Q2H PRN **AND** naloxone (NARCAN) injection 0.4 mg, 0.4 mg, Intravenous, Q5 Min PRN, Anthony Smalls MD    ipratropium-albuterol (DUO-NEB) nebulizer solution 3 mL, 3 mL, Nebulization, Once PRN, Maykel Wharton MD    labetalol (NORMODYNE,TRANDATE) injection 5 mg, 5 mg, Intravenous, Q5 Min PRN, Maykel Wharton MD    lactated ringers infusion, 9 mL/hr, Intravenous, Continuous, Anthony Smalls MD, Last Rate: 9 mL/hr at 08/15/23 1511, Restarted at 08/15/23 1712    loperamide (IMODIUM) capsule 2 mg, 2 mg, Oral, TID PRN, Anthony Smalls MD, 2 mg at 08/14/23 1815    Magnesium Standard Dose Replacement - Follow Nurse / BPA Driven Protocol, , Does not apply, PRN, Anthony Smalls MD    metoprolol succinate XL (TOPROL-XL) 24 hr tablet 12.5 mg, 12.5 mg, Oral, Q24H, Anthony Smalls MD    naloxone (NARCAN) injection 0.2 mg, 0.2 mg, Intravenous, PRN, Maykel Wharton MD    nitroglycerin (NITROSTAT) SL tablet 0.4 mg, 0.4 mg, Sublingual, Q5 Min PRN, Anthony Smalls MD    ondansetron (ZOFRAN) injection 4 mg, 4 mg, Intravenous, Q6H PRN, Anthony Smalls MD, 4 mg at 08/13/23  0439    ondansetron (ZOFRAN) injection 4 mg, 4 mg, Intravenous, Once PRN, Maykel Wharton MD    Phosphorus Replacement - Follow Nurse / BPA Driven Protocol, , Does not apply, PRN, Anthony Smalls MD    polycarbophil tablet 625 mg, 625 mg, Oral, BID, Anthony Smalls MD, 625 mg at 08/15/23 0808    Potassium Replacement - Follow Nurse / BPA Driven Protocol, , Does not apply, PRN, Anthony Smalls MD    promethazine (PHENERGAN) suppository 25 mg, 25 mg, Rectal, Once PRN **OR** promethazine (PHENERGAN) tablet 25 mg, 25 mg, Oral, Once PRN, Maykel Wharton MD    [COMPLETED] Insert Peripheral IV, , , Once **AND** sodium chloride 0.9 % flush 10 mL, 10 mL, Intravenous, PRN, Anthony Smalls MD, 10 mL at 08/13/23 0838    sucralfate (CARAFATE) tablet 1 g, 1 g, Oral, 4x Daily AC & at Bedtime, Anthony Smalls MD, 1 g at 08/15/23 0809    vitamin D (ERGOCALCIFEROL) capsule 50,000 Units, 50,000 Units, Oral, Q7 Days, Anthony Smalls MD      Objective     Vital Signs  Vitals:    08/16/23 0230 08/16/23 0300 08/16/23 0347 08/16/23 0410   BP: 127/79 115/71 126/69    BP Location:   Right arm    Patient Position:   Lying    Pulse: 114 111 106 106   Resp:   14    Temp:    98.1 øF (36.7 øC)   TempSrc:    Axillary   SpO2:   100%    Weight:       Height:           Physical Exam: Resting in bed, son present    General Appearance:    Awake and alert, in no acute distress, frail and ill-appearing   Head:    Normocephalic, without obvious abnormality   Eyes:          Conjunctivae normal, anicteric sclera   Throat:   No oral lesions, no thrush, oral mucosa moist   Neck:   No adenopathy, supple, no JVD   Lungs:     Respirations regular, even and unlabored   Abdomen:     Soft, non-tender, non-distended, no rebound or guarding, no hepatosplenomegaly   Rectal:     Deferred   Extremities:   No edema, no cyanosis, moves equally bilaterally   Skin:   No bruising, no rash, no jaundice        Results Review:    CBC  Results from last 7  days   Lab Units 08/15/23  0617 08/14/23  0528 08/13/23  0439 08/12/23  0637 08/11/23 2029   RBC 10*6/mm3 3.33* 3.50* 3.57* 4.27 4.13   WBC 10*3/mm3 20.68* 23.74* 25.98* 24.05* 22.49*   HEMOGLOBIN g/dL 10.1* 10.5* 10.6* 12.4 12.0   PLATELETS 10*3/mm3 193 218 231 284 279       CMP  Results from last 7 days   Lab Units 08/16/23  0600 08/15/23  0617 08/14/23  0528 08/13/23  1756 08/13/23  0532 08/12/23  1625 08/11/23 2029   SODIUM mmol/L 145 148* 149* 147* 143 144 134*   POTASSIUM mmol/L 4.0 3.7 4.8 3.1* 2.2* 3.3* 3.2*   CHLORIDE mmol/L 110* 112* 114* 109* 107 112* 100   CO2 mmol/L 22.5 26.4 25.7 28.4 27.0 14.0* 11.4*   BUN mg/dL 15 14 15 20 27* 31* 32*   CREATININE mg/dL 1.17* 0.82 0.78 1.02* 1.21* 1.53* 1.45*   GLUCOSE mg/dL 87 79 72 124* 238* 131* 88   ALBUMIN g/dL 2.6*  --   --  1.9* 1.7* 2.0* 2.0*   BILIRUBIN mg/dL  --   --   --   --  0.3 0.2 0.4   ALK PHOS U/L  --   --   --   --  157* 180* 186*   AST (SGOT) U/L  --   --   --   --  29 37* 44*   ALT (SGPT) U/L  --   --   --   --  19 22 22       Amylase and Lipase  Results from last 7 days   Lab Units 08/11/23 2029   LIPASE U/L 12*       CRP         Imaging Results (Last 24 Hours)       Procedure Component Value Units Date/Time    Arteriogram (Autofinalize) [[id number]] Resulted: 08/15/23 1644     Updated: 08/15/23 1644    Narrative:      This procedure was auto-finalized with no dictation required.              ASSESSMENT:  77-year-old female with history of pancreatic cancer s/p Whipple procedure, HTN, HLD, and PVD who presented with complaints of abdominal pain, nausea, vomiting, and diarrhea.  -Diarrhea -stool tests positive for E. 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  Pancreatic elastase stool test pending, consider Creon.  She is now on Pletal which can worsen diarrhea.  Continue famotidine, sucralfate,  and fiber supplement.  Palliative care was consulted.  Patient is now DNR.  Dr. Arnold will follow.       Kelly Barry, APRN  08/16/23  07:39 EDT

## 2023-08-16 NOTE — PROGRESS NOTES
"   LOS: 2 days     Chief Complaint/ Reason for encounter: JUANI, acidosis and electrolyte abnormalities    Subjective   08/13/23 : She feels about the same today, having some diarrhea, still very weak  Denies shortness of breath or chest pain  No fevers or chills  Oral intake low but no nausea or vomiting    8/14: No new complaints  Stress test underway  No shortness of breath or chest pain    8/15: No new complaints.  She looks and feels better  No shortness of breath or chest pain  Edema improved.  Scheduled for angiogram today    8/16 s/p angiogram yesterday. Having pain in legs, but improved after pain medicine. Trying to eat some today    Medical history reviewed:  History of Present Illness    Subjective    History taken from: Patient and chart    Vital Signs  Temp:  [94 øF (34.4 øC)-98.1 øF (36.7 øC)] 97.3 øF (36.3 øC)  Heart Rate:  [] 90  Resp:  [8-16] 16  BP: ()/(53-87) 107/70       Wt Readings from Last 1 Encounters:   08/13/23 1353 44 kg (97 lb)   08/11/23 2017 44 kg (97 lb)       Objective:  Vital signs: (most recent): Blood pressure 107/70, pulse 90, temperature 97.3 øF (36.3 øC), temperature source Oral, resp. rate 16, height 152.4 cm (60\"), weight 44 kg (97 lb), SpO2 97 %.              Objective:  General Appearance:  Comfortable, thin, somewhat cachectic, chronically ill-appearing, in no acute distress and not in pain.  Awake, alert, oriented  HEENT: Mucous membranes moist, no injury, oropharynx clear  Lungs:  Normal effort and normal respiratory rate.  Breath sounds clear to auscultation.  No  respiratory distress.  No rales, decreased breath sounds or rhonchi.    Heart: Normal rate.  Regular rhythm.  S1, S2 normal.  No murmur.   Abdomen: Abdomen is soft.  Bowel sounds are normal, no abdominal tenderness.  There is no rebound or guarding  Extremities: No significant edema of bilateral lower extremities, Thin, cachectic extremities  Neurological: No focal motor or sensory deficits, pupils " reactive  Skin:  Warm and dry.  No rash or cyanosis.       Results Review:    Intake/Output:     Intake/Output Summary (Last 24 hours) at 8/16/2023 1344  Last data filed at 8/15/2023 2237  Gross per 24 hour   Intake 1250 ml   Output 300 ml   Net 950 ml           DATA:  Radiology and Labs:  The following labs independently reviewed by me. Additional labs ordered for tomorrow a.m.  Interval notes, chart personally reviewed by me.   Old records independently reviewed showing baseline creatinine looks to be around 0.6 though creatinine was 1.2 about a month ago  Discussed with patient and her nurse at bedside    Risk/ complexity of medical care/ medical decision making: Moderate complexity, severe electrolyte abnormalities    Labs:   Recent Results (from the past 24 hour(s))   Magnesium    Collection Time: 08/16/23  6:00 AM    Specimen: Blood   Result Value Ref Range    Magnesium 2.5 (H) 1.6 - 2.4 mg/dL   Renal Function Panel    Collection Time: 08/16/23  6:00 AM    Specimen: Blood   Result Value Ref Range    Glucose 87 65 - 99 mg/dL    BUN 15 8 - 23 mg/dL    Creatinine 1.17 (H) 0.57 - 1.00 mg/dL    Sodium 145 136 - 145 mmol/L    Potassium 4.0 3.5 - 5.2 mmol/L    Chloride 110 (H) 98 - 107 mmol/L    CO2 22.5 22.0 - 29.0 mmol/L    Calcium 7.8 (L) 8.6 - 10.5 mg/dL    Albumin 2.6 (L) 3.5 - 5.2 g/dL    Phosphorus 4.6 (H) 2.5 - 4.5 mg/dL    Anion Gap 12.5 5.0 - 15.0 mmol/L    BUN/Creatinine Ratio 12.8 7.0 - 25.0    eGFR 47.6 (L) >60.0 mL/min/1.73   CBC Auto Differential    Collection Time: 08/16/23  6:00 AM    Specimen: Blood   Result Value Ref Range    WBC 22.06 (H) 3.40 - 10.80 10*3/mm3    RBC 3.06 (L) 3.77 - 5.28 10*6/mm3    Hemoglobin 8.9 (L) 12.0 - 15.9 g/dL    Hematocrit 26.8 (L) 34.0 - 46.6 %    MCV 87.6 79.0 - 97.0 fL    MCH 29.1 26.6 - 33.0 pg    MCHC 33.2 31.5 - 35.7 g/dL    RDW 13.0 12.3 - 15.4 %    RDW-SD 40.5 37.0 - 54.0 fl    MPV 10.0 6.0 - 12.0 fL    Platelets 195 140 - 450 10*3/mm3   Manual Differential     Collection Time: 08/16/23  6:00 AM    Specimen: Blood   Result Value Ref Range    Neutrophil % 94.9 (H) 42.7 - 76.0 %    Lymphocyte % 2.0 (L) 19.6 - 45.3 %    Monocyte % 3.1 (L) 5.0 - 12.0 %    Neutrophils Absolute 20.93 (H) 1.70 - 7.00 10*3/mm3    Lymphocytes Absolute 0.44 (L) 0.70 - 3.10 10*3/mm3    Monocytes Absolute 0.68 0.10 - 0.90 10*3/mm3    Poikilocytes Mod/2+ None Seen    WBC Morphology Normal Normal    Platelet Morphology Normal Normal   POC Glucose Once    Collection Time: 08/16/23  1:11 PM    Specimen: Blood   Result Value Ref Range    Glucose 79 70 - 130 mg/dL       Radiology:  Pertinent radiology studies were reviewed as described above      Medications have been reviewed separately in chart overview      ASSESSMENT:  ARF, prerenal and significantly improved.  Creatinine near baseline today  Hypokalemia, much better after replacement  Metabolic acidosis, largely resolved after bicarb drip  Vomiting with diarrhea, improved  HTN  hypomagnesemia  hypocalcemia, confirmed by ionized calcium.  Replacing orally and IV           PLAN:   Renal function  slightly worse this am.   Did have angiogram yesterday. Currently off ABR and diuretics.   Palliative following and discussing GOC  At this time, will continue with current regimen,  BP stable on amlodipine.  Continue to hold ARB  Check labs in am if still within goals  Na improved and Ka and Mg ok.   Phos trending up, not on any supplements. Monitor for now     Monitor electrolytes and volume closely   Please call with any questions or concerns.       Caridad Richard MD  [facility]   Office phone number: [phone]  Answering service phone number: [phone]    08/16/23  13:44 EDT

## 2023-08-16 NOTE — PROGRESS NOTES
"Daily progress note    Primary care physician      Chief complaint  Doing same with no new issues and family at bedside    History of present illness  77-year-old white female with history of pancreatic cancer hypertension hyperlipidemia and peripheral vascular disease brought to the emergency room by the family with severe abdominal pain for last 1 week that she is not eating drinking.  Patient also complained of nausea vomiting after eating and has couple of loose stools.  Patient denies any fever chills black stools blood in the stools.  Patient afraid of eating as it hurts more when she eats.  Patient work-up in ER revealed acute kidney injury and probably has ischemic bowel admitted for management.  Patient is DNR per her wishes.     REVIEW OF SYSTEMS  Unremarkable except generalized weakness     PHYSICAL EXAM  Blood pressure 107/70, pulse 90, temperature 97.3 øF (36.3 øC), temperature source Oral, resp. rate 16, height 152.4 cm (60\"), weight 44 kg (97 lb), SpO2 97 %.    GENERAL: Awake and alert, chronically ill-appearing, cachectic, no acute distress  HENT: nares patent, mucous membranes dry  EYES: no scleral icterus, EOMI  CV: regular rhythm, normal rate, port present right anterior chest wall  RESPIRATORY: normal effort  ABDOMEN: soft,, nondistended, moderate tenderness bowel sounds positive  MUSCULOSKELETAL: no deformity  NEURO: alert, moves all extremities, follows commands, cranial nerves II through XII intact, speech fluent and clear  PSYCH:  calm, cooperative  SKIN: warm, dry     LAB RESULTS  Lab Results (last 24 hours)       Procedure Component Value Units Date/Time    POC Glucose Once [[id number]]  (Normal) Collected: 08/16/23 1311    Specimen: Blood Updated: 08/16/23 1313     Glucose 79 mg/dL     Manual Differential [[id number]]  (Abnormal) Collected: 08/16/23 0600    Specimen: Blood Updated: 08/16/23 0758     Neutrophil % 94.9 %      Lymphocyte % 2.0 %      Monocyte % 3.1 %      Neutrophils " Absolute 20.93 10*3/mm3      Lymphocytes Absolute 0.44 10*3/mm3      Monocytes Absolute 0.68 10*3/mm3      Poikilocytes Mod/2+     WBC Morphology Normal     Platelet Morphology Normal    CBC & Differential [[id number]]  (Abnormal) Collected: 08/16/23 0600    Specimen: Blood Updated: 08/16/23 0758    Narrative:      The following orders were created for panel order CBC & Differential.  Procedure                               Abnormality         Status                     ---------                               -----------         ------                     CBC Auto Differential[[id number]]        Abnormal            Final result                 Please view results for these tests on the individual orders.    CBC Auto Differential [[id number]]  (Abnormal) Collected: 08/16/23 0600    Specimen: Blood Updated: 08/16/23 0758     WBC 22.06 10*3/mm3      RBC 3.06 10*6/mm3      Hemoglobin 8.9 g/dL      Hematocrit 26.8 %      MCV 87.6 fL      MCH 29.1 pg      MCHC 33.2 g/dL      RDW 13.0 %      RDW-SD 40.5 fl      MPV 10.0 fL      Platelets 195 10*3/mm3     Renal Function Panel [[id number]]  (Abnormal) Collected: 08/16/23 0600    Specimen: Blood Updated: 08/16/23 0725     Glucose 87 mg/dL      BUN 15 mg/dL      Creatinine 1.17 mg/dL      Sodium 145 mmol/L      Potassium 4.0 mmol/L      Chloride 110 mmol/L      CO2 22.5 mmol/L      Calcium 7.8 mg/dL      Albumin 2.6 g/dL      Phosphorus 4.6 mg/dL      Anion Gap 12.5 mmol/L      BUN/Creatinine Ratio 12.8     eGFR 47.6 mL/min/1.73     Narrative:      GFR Normal >60  Chronic Kidney Disease <60  Kidney Failure <15    The GFR formula is only valid for adults with stable renal function between ages 18 and 70.    Magnesium [[id number]]  (Abnormal) Collected: 08/16/23 0600    Specimen: Blood Updated: 08/16/23 0706     Magnesium 2.5 mg/dL     Blood Culture - Blood, Arm, Left [[id number]]  (Normal) Collected: 08/11/23 2130    Specimen: Blood from Arm, Left Updated: 08/15/23 2146     Blood  Culture No growth at 4 days    Narrative:      Less than seven (7) mL's of blood was collected.  Insufficient quantity may yield false negative results.    Blood Culture - Blood, Arm, Right [[id number]]  (Normal) Collected: 08/11/23 2101    Specimen: Blood from Arm, Right Updated: 08/15/23 2117     Blood Culture No growth at 4 days          Imaging Results (Last 24 Hours)       Procedure Component Value Units Date/Time    Arteriogram (Autofinalize) [[id number]] Resulted: 08/15/23 1644     Updated: 08/15/23 1644    Narrative:      This procedure was auto-finalized with no dictation required.            Current Facility-Administered Medications:     aspirin chewable tablet 81 mg, 81 mg, Oral, Daily, Anthony Smalls MD, 81 mg at 08/16/23 0851    calcium carbonate (TUMS) chewable tablet 500 mg (200 mg elemental), 2 tablet, Oral, BID, Anthony Smalls MD, 2 tablet at 08/16/23 0851    Calcium Replacement - Follow Nurse / BPA Driven Protocol, , Does not apply, PRN, Anthony Smalls MD    cilostazol (PLETAL) tablet 50 mg, 50 mg, Oral, BID Slim LOPEZ Matthew T, MD, 50 mg at 08/16/23 0658    diphenhydrAMINE (BENADRYL) injection 12.5 mg, 12.5 mg, Intravenous, Q15 Min PRN, Maykel Wharton MD    droperidol (INAPSINE) injection 0.625 mg, 0.625 mg, Intravenous, Q20 Min PRN **OR** droperidol (INAPSINE) injection 0.625 mg, 0.625 mg, Intramuscular, Q20 Min PRN, Maykel Wharton MD    ePHEDrine injection 5 mg, 5 mg, Intravenous, Once PRN, Maykel Wharton MD    famotidine (PEPCID) tablet 40 mg, 40 mg, Oral, BID Slim LOPEZ Matthew T, MD, 40 mg at 08/16/23 0658    fentaNYL citrate (PF) (SUBLIMAZE) injection 25 mcg, 25 mcg, Intravenous, Q5 Min PRN, Maykel Wharton MD    flumazenil (ROMAZICON) injection 0.2 mg, 0.2 mg, Intravenous, PRN, Maykel Wharton MD    heparin (porcine) 5000 UNIT/ML injection 5,000 Units, 5,000 Units, Subcutaneous, Q12H, Anthony Smalls MD, 5,000 Units at 08/16/23 0834    hydrALAZINE  (APRESOLINE) injection 5 mg, 5 mg, Intravenous, Q10 Min PRN, Maykel Wharton MD    HYDROcodone-acetaminophen (NORCO) 5-325 MG per tablet 1 tablet, 1 tablet, Oral, Once PRN, Maykel Wharton MD    HYDROcodone-acetaminophen (NORCO) 7.5-325 MG per tablet 1 tablet, 1 tablet, Oral, Q4H PRN, Anthony Smalls MD, 1 tablet at 08/16/23 0701    HYDROcodone-acetaminophen (NORCO) 7.5-325 MG per tablet 1 tablet, 1 tablet, Oral, Q4H PRN, Maykel Wharton MD    HYDROmorphone (DILAUDID) injection 0.25 mg, 0.25 mg, Intravenous, Q5 Min PRN, Maykel Wharton MD, 0.25 mg at 08/15/23 2211    HYDROmorphone (DILAUDID) injection 0.5 mg, 0.5 mg, Intravenous, Q4H PRN, Anthony Smalls MD, 0.5 mg at 08/15/23 1003    HYDROmorphone (DILAUDID) injection 0.5 mg, 0.5 mg, Intravenous, Q2H PRN, 0.5 mg at 08/16/23 1243 **AND** naloxone (NARCAN) injection 0.4 mg, 0.4 mg, Intravenous, Q5 Min PRN, Anthony Smalls MD    ipratropium-albuterol (DUO-NEB) nebulizer solution 3 mL, 3 mL, Nebulization, Once PRN, Maykel Wharton MD    labetalol (NORMODYNE,TRANDATE) injection 5 mg, 5 mg, Intravenous, Q5 Min PRN, Maykel Wharton MD    lactated ringers infusion, 9 mL/hr, Intravenous, Continuous, Anthony Smalls MD, Last Rate: 9 mL/hr at 08/15/23 1511, Restarted at 08/15/23 1712    loperamide (IMODIUM) capsule 2 mg, 2 mg, Oral, TID PRN, Anthony Smalls MD, 2 mg at 08/14/23 1815    Magnesium Standard Dose Replacement - Follow Nurse / BPA Driven Protocol, , Does not apply, PRN, Anthony Smalls MD    metoprolol succinate XL (TOPROL-XL) 24 hr tablet 12.5 mg, 12.5 mg, Oral, Q24H, Anthony Smalls MD, 12.5 mg at 08/16/23 0851    naloxone (NARCAN) injection 0.2 mg, 0.2 mg, Intravenous, PRN, Maykel Wharton MD    nitroglycerin (NITROSTAT) SL tablet 0.4 mg, 0.4 mg, Sublingual, Q5 Min PRN, Anthony Smalls MD    ondansetron (ZOFRAN) injection 4 mg, 4 mg, Intravenous, Q6H PRN, Anthony Smalls MD, 4 mg at 08/13/23 1993     ondansetron (ZOFRAN) injection 4 mg, 4 mg, Intravenous, Once PRN, Maykel Wharton MD    Phosphorus Replacement - Follow Nurse / BPA Driven Protocol, , Does not apply, PRN, Anthony Smalls MD    polycarbophil tablet 625 mg, 625 mg, Oral, BID, Anthony Smalls MD, 625 mg at 08/16/23 0851    Potassium Replacement - Follow Nurse / BPA Driven Protocol, , Does not apply, PRN, Anthony Smalls MD    promethazine (PHENERGAN) suppository 25 mg, 25 mg, Rectal, Once PRN **OR** promethazine (PHENERGAN) tablet 25 mg, 25 mg, Oral, Once PRN, Maykel Wharton MD    [COMPLETED] Insert Peripheral IV, , , Once **AND** sodium chloride 0.9 % flush 10 mL, 10 mL, Intravenous, PRN, Anthony Smalls MD, 10 mL at 08/16/23 0853    sucralfate (CARAFATE) tablet 1 g, 1 g, Oral, 4x Daily AC & at Bedtime, Anthony Smalls MD, 1 g at 08/16/23 1243    vitamin D (ERGOCALCIFEROL) capsule 50,000 Units, 50,000 Units, Oral, Q7 Days, Anthony Smalls MD     ASSESSMENT  Superior mesenteric artery occlusion nonsurgical treatment  Acute kidney injury resolved  Abdominal pain with nausea vomiting diarrhea enteropathogenic E. coli positive by GI  Hypokalemia hypomagnesemia replaced  Nonsustained V. tach  Metabolic acidosis resolved  Peripheral artery disease  History of pancreatic cancer    PLAN  CPM  Continue IVF  Continue IV antibiotics  Check bilateral lower extremity arterial study  Symptomatic treatment for abdominal pain nausea vomiting  Gastroenterology nephrology and cardiology to follow patient  Adjust home medications  Stress ulcer DVT prophylaxis  Supportive care  PT/OT  DNR with poor prognosis  Discussed with family nursing staff  Follow closely further recommendation current hospital course    GENESIS MATTHEW MD    Copied text in this note has been reviewed and is accurate as of 08/16/23

## 2023-08-16 NOTE — SIGNIFICANT NOTE
08/16/23 1059   OTHER   Discipline physical therapist   Rehab Time/Intention   Session Not Performed other (see comments)  (attempted to see this am, however pt is sleeping. family present in room, politely decline PT today, pt not feeling well. will follow up as appropriate.)   Recommendation   PT - Next Appointment 08/17/23

## 2023-08-16 NOTE — PROGRESS NOTES
".            [facility]iative [facility] Services    Palliative Care Daily Progress Note   Chief complaint-follow up goals of care/advanced care planning and support for patient/family    Code Status:   Code Status and Medical Interventions:   Ordered at: 23 1412     Medical Intervention Limits:    NO intubation (DNI)    NO cardioversion     Level Of Support Discussed With:    Patient     Code Status (Patient has no pulse and is not breathing):    No CPR (Do Not Attempt to Resuscitate)     Medical Interventions (Patient has pulse or is breathing):    Limited Support      Advanced Directives: Advance Directive Status: Patient has advance directive, copy in chart   Goals of Care: Ongoing.     S: Medical record reviewed. Events noted.  Patient off unit for testing. Her son/HCS José Miguel was at bedside. Reviewed medical notes, therapy notes, procedure notes and MAR. Spoke with GIANNA Richard.     Attempt at arteriogram yesterday was unsuccessful, Pletal was added and plans to obtain lower and upper extremity arterial testing to determine amount of disease per vascular.          ROS-pt off unit for test  Pain Assessment  Preferred Pain Scale: number (Numeric Rating Pain Scale)  Nonverbal Indicators of Pain: nonverbal indicators absent  PAINAD Breathin-->normal  PAINAD Negative Vocalization: 0-->none  PAINAD Facial Expression: 0-->smiling or inexpressive  PAINAD Body Language: 0-->relaxed  PAINAD Consolability: 0-->no need to console  PAINAD Score: 0  Pain Location: other (see comments) (\"everywhere\")  Pain Description: constant, aching  Pain Onset: recent (weeks)  Pain Duration: weeks  Factors That Aggravate Pain: movement, positioning  Factors That Relieve Pain: repositioning  Lifestyle Changes/Adaptations in Response to Pain: decreased activity level    O:     Intake/Output Summary (Last 24 hours) at 2023 0939  Last data filed at 8/15/2023 2232  Gross per 24 hour   Intake 1250 ml   Output 300 ml "   Net 950 ml       Diagnostics and current medications: Reviewed.    Current Facility-Administered Medications   Medication Dose Route Frequency Provider Last Rate Last Admin    aspirin chewable tablet 81 mg  81 mg Oral Daily Anthony Smalls MD   81 mg at 08/16/23 0851    calcium carbonate (TUMS) chewable tablet 500 mg (200 mg elemental)  2 tablet Oral BID Anthony Smalls MD   2 tablet at 08/16/23 0851    Calcium Replacement - Follow Nurse / BPA Driven Protocol   Does not apply PRN Anthony Smalls MD        cilostazol (PLETAL) tablet 50 mg  50 mg Oral BID AC Anthony Smalls MD   50 mg at 08/16/23 0658    diphenhydrAMINE (BENADRYL) injection 12.5 mg  12.5 mg Intravenous Q15 Min PRN Maykel Wharton MD        droperidol (INAPSINE) injection 0.625 mg  0.625 mg Intravenous Q20 Min PRN Maykel Wharton MD        Or    droperidol (INAPSINE) injection 0.625 mg  0.625 mg Intramuscular Q20 Min PRN Maykel Wharton MD        ePHEDrine injection 5 mg  5 mg Intravenous Once PRN Maykel Wharton MD        famotidine (PEPCID) tablet 40 mg  40 mg Oral BID AC Anthony Smalls MD   40 mg at 08/16/23 0658    fentaNYL citrate (PF) (SUBLIMAZE) injection 25 mcg  25 mcg Intravenous Q5 Min PRN Maykel Wharton MD        flumazenil (ROMAZICON) injection 0.2 mg  0.2 mg Intravenous PRN Maykel Wharton MD        heparin (porcine) 5000 UNIT/ML injection 5,000 Units  5,000 Units Subcutaneous Q12H Anthony Smalls MD   5,000 Units at 08/16/23 0852    hydrALAZINE (APRESOLINE) injection 5 mg  5 mg Intravenous Q10 Min PRN Maykel Wharton MD        HYDROcodone-acetaminophen (NORCO) 5-325 MG per tablet 1 tablet  1 tablet Oral Once PRN Maykel Wharton MD        HYDROcodone-acetaminophen (NORCO) 7.5-325 MG per tablet 1 tablet  1 tablet Oral Q4H PRN Anthony Smalls MD   1 tablet at 08/16/23 0701    HYDROcodone-acetaminophen (NORCO) 7.5-325 MG per tablet 1 tablet  1 tablet Oral Q4H PRN Akiko  Maykel DUARTE MD        HYDROmorphone (DILAUDID) injection 0.25 mg  0.25 mg Intravenous Q5 Min PRN Maykel Wharton MD   0.25 mg at 08/15/23 2211    HYDROmorphone (DILAUDID) injection 0.5 mg  0.5 mg Intravenous Q4H PRN Antohny Smalls MD   0.5 mg at 08/15/23 1003    HYDROmorphone (DILAUDID) injection 0.5 mg  0.5 mg Intravenous Q2H PRN Anthony Smalls MD        And    naloxone (NARCAN) injection 0.4 mg  0.4 mg Intravenous Q5 Min PRN Anthony Smalls MD        ipratropium-albuterol (DUO-NEB) nebulizer solution 3 mL  3 mL Nebulization Once PRN Maykel Wharton MD        labetalol (NORMODYNE,TRANDATE) injection 5 mg  5 mg Intravenous Q5 Min PRN Maykel Wharton MD        lactated ringers infusion  9 mL/hr Intravenous Continuous Anthony Smalls MD 9 mL/hr at 08/15/23 1511 Restarted at 08/15/23 1712    loperamide (IMODIUM) capsule 2 mg  2 mg Oral TID PRN Anthony Smalls MD   2 mg at 08/14/23 1815    Magnesium Standard Dose Replacement - Follow Nurse / BPA Driven Protocol   Does not apply PRN Anthony Smalls MD        metoprolol succinate XL (TOPROL-XL) 24 hr tablet 12.5 mg  12.5 mg Oral Q24H Anthony Smalls MD   12.5 mg at 08/16/23 0851    naloxone (NARCAN) injection 0.2 mg  0.2 mg Intravenous PRN Maykel Wharton MD        nitroglycerin (NITROSTAT) SL tablet 0.4 mg  0.4 mg Sublingual Q5 Min PRN Anthony Smalls MD        ondansetron (ZOFRAN) injection 4 mg  4 mg Intravenous Q6H PRN Anthony Smalls MD   4 mg at 08/13/23 0439    ondansetron (ZOFRAN) injection 4 mg  4 mg Intravenous Once PRN Maykel Wharton MD        Phosphorus Replacement - Follow Nurse / BPA Driven Protocol   Does not apply PRN Anthony Smalls MD        polycarbophil tablet 625 mg  625 mg Oral BID Anthony Smalls MD   625 mg at 08/16/23 0851    Potassium Replacement - Follow Nurse / BPA Driven Protocol   Does not apply PRN Anthony Smalls MD        promethazine (PHENERGAN) suppository 25 mg  25 mg Rectal Once PRN  "Maykel Wharton MD        Or    promethazine (PHENERGAN) tablet 25 mg  25 mg Oral Once PRN Maykel Wharton MD        sodium chloride 0.9 % flush 10 mL  10 mL Intravenous PRN Anthony Smalls MD   10 mL at 08/16/23 0853    sucralfate (CARAFATE) tablet 1 g  1 g Oral 4x Daily AC & at Bedtime Anthony Smalls MD   1 g at 08/16/23 0851    vitamin D (ERGOCALCIFEROL) capsule 50,000 Units  50,000 Units Oral Q7 Days Anthony Smalls MD         lactated ringers, 9 mL/hr, Last Rate: 9 mL/hr (08/15/23 1511)        Calcium Replacement - Follow Nurse / BPA Driven Protocol    diphenhydrAMINE    droperidol **OR** droperidol    ePHEDrine    fentanyl    flumazenil    hydrALAZINE    HYDROcodone-acetaminophen    HYDROcodone-acetaminophen    HYDROcodone-acetaminophen    HYDROmorphone    HYDROmorphone    HYDROmorphone **AND** naloxone    ipratropium-albuterol    labetalol    loperamide    Magnesium Standard Dose Replacement - Follow Nurse / BPA Driven Protocol    naloxone    nitroglycerin    ondansetron    ondansetron    Phosphorus Replacement - Follow Nurse / BPA Driven Protocol    Potassium Replacement - Follow Nurse / BPA Driven Protocol    promethazine **OR** promethazine    [COMPLETED] Insert Peripheral IV **AND** sodium chloride  Attest that current medications reviewed including but not limited to prescriptions, over-the counter, herbals and vitamin/mineral/dietary (nutritional) supplements for name, route of administration, type, dose and frequency and are current using all immediate resources available at time of dictation.    A:    /75 (BP Location: Right arm, Patient Position: Lying)   Pulse 95   Temp 97.5 øF (36.4 øC) (Oral)   Resp 16   Ht 152.4 cm (60\")   Wt 44 kg (97 lb)   SpO2 97%   BMI 18.94 kg/mý     Physical Exam-pt off unit for test    Patient status: Disease state: Controlled with current treatments.  Functional status: Palliative Performance Scale Score: Performance 60% based on the " following measures: Ambulation: Reduced, Activity and Evidence of Disease: Unable to do hobby or some work, significant evidence of disease, Self-Care: Occasional assist necessary,  Intake: Normal or reduced, LOC: Full or confusion   ECOG Status(1) Restricted in physically strenuous activity, ambulatory and able to do work of light nature.  Nutritional status: Albumin 1.9 on 2023 Body mass index is 18.94 kg/mý.    Family support: The patient receives support from her children..  Advance Directives: Advance Directive Status: Patient has advance directive, copy requested   POA/Healthcare surrogate-son- José Miguel.     Impression/Problem List:     Acute kidney injury   E-coli of stool   Lactoferrin positive  Chronic mesenteric ischemia with SMA occlusion and stenosis of ICA  Severe peripheral vascular disease  Pancreatic cancer s/p   whipple and chemoradiation        Recommendations/Plan:  1. Provide support with goals of care        2.  Palliative care encounter  8/15/2023- I spoke with patient regarding goals of care. She has a fairly good understanding of her medical conditions, which we reviewed both acute and chronic. She does have a living will on file and her son, José Miguel is her healthcare surrogate. Of note, her spouse  about one year ago on inpatient palliative care unit. Shortly after her spouse death she was diagnosed with pancreatic cancer. She underwent Whipple procedure and complete chemo and radiation (last treatment 2023). She did have feeding tube after surgery. She reports she was doing fairly well and resided at home alone and independent with ADLs. She does have the support of five children (José Miguel, Ivette, Rainer and  live locally and Sangita lives in [place]). She understands the current risk for upcoming arteriogram and hopeful it will be successful. Her goal at this time is to be treated for her condition and return to home setting. I did provide information regarding palliative care as well.  I will plan to follow up tomorrow to determine outcome of arteriogram. She did have complaints of 10/10 pain in left leg-severe PVD, aching and constant. I informed RN who provided pain medication. She denies anxiety, depression, shortness of breath on assessment. No spiritual concerns identified. I spoke with GIANNA Payne.          8/16/2023- I called and spoke with patient son, José Miguel at 0823. He is aware of patient condition. His sister, Sangita will be flying in later today around 2:40 pm. He is going to reach out to his other siblings and call me back with a time that will work for them to discuss goals of care further and provide information regarding palliative care/[facility] as well.   1120: Patient was off unit for testing. Her son/HCS, José Miguel was at bedside. I provided information regarding palliative care, [facility] and [facility]. He has good  understanding of patient conditions and refers to recent conversation with Dr Smalls earlier this morning. He is now awaiting results of upper and lower extremity doppler studies. He plans to discuss goals further with his mother later today. He has yet to determine time to meet with his siblings but will let me know at his earliest convenience. I spoke with Dr Smith and Hilary KATZ.        Thank you for this consult and allowing us to participate in patient's plan of care. Palliative Care Team will continue to follow patient.     Time spent:30 minutes spent reviewing medical and medication records, assessing and examining patient, discussing with family, answering questions, providing some guidance about a plan and documentation of care, and coordinating care with other healthcare members, with > 50% time spent face to face.       Melody Torres, KAIN  8/16/2023  09:39 EDT

## 2023-08-16 NOTE — PLAN OF CARE
Goal Outcome Evaluation:  Plan of Care Reviewed With: patient, son        Progress: no change  Outcome Evaluation: Patient returned  from  surgery  at  2300.  Sleepy,  uncoperative  at  the  time.   Blood  pressure  monitored.   Has  gaxiola  catheter to  BSD  with   yellow  urine  draining.  Gaxiola  care  done.  Has  multiple  skin  tears  and BUE  weeping,  dressingsd  changed  x  2  this  shift.  now  more  aswake  and  alert.  No  complaints of pain voiced.  Tolerating  ice    chips.   ST on the  monitor.  Nursing  will  continue to monitor.

## 2023-08-17 NOTE — PROGRESS NOTES
"   LOS: 3 days     Chief Complaint/ Reason for encounter: JUANI, acidosis and electrolyte abnormalities    Subjective   08/13/23 : She feels about the same today, having some diarrhea, still very weak  Denies shortness of breath or chest pain  No fevers or chills  Oral intake low but no nausea or vomiting    8/14: No new complaints  Stress test underway  No shortness of breath or chest pain    8/15: No new complaints.  She looks and feels better  No shortness of breath or chest pain  Edema improved.  Scheduled for angiogram today    8/16 s/p angiogram yesterday. Having pain in legs, but improved after pain medicine. Trying to eat some today    8/17: Somewhat depressed mood today.  Patient and family have been talking with palliative care and it sounds like they are leaning toward moving her to comfort care later today  She denies any fevers or chills, making some urine  Oral intake poor    Medical history reviewed:  History of Present Illness    Subjective    History taken from: Patient and chart    Vital Signs  Temp:  [97.3 øF (36.3 øC)-97.6 øF (36.4 øC)] 97.3 øF (36.3 øC)  Heart Rate:  [89-97] 97  Resp:  [16] 16  BP: (113-130)/(58-66) 113/66       Wt Readings from Last 1 Encounters:   08/17/23 0300 42.9 kg (94 lb 9.2 oz)   08/13/23 1353 44 kg (97 lb)   08/11/23 2017 44 kg (97 lb)       Objective:  Vital signs: (most recent): Blood pressure 113/66, pulse 97, temperature 97.3 øF (36.3 øC), temperature source Oral, resp. rate 16, height 152.4 cm (60\"), weight 42.9 kg (94 lb 9.2 oz), SpO2 94 %.              Objective:  General Appearance:  Comfortable, thin, somewhat cachectic, chronically ill-appearing, in no acute distress and not in pain.  Awake, alert, oriented  HEENT: Mucous membranes moist, no injury, oropharynx clear  Lungs:  Normal effort and normal respiratory rate.  Breath sounds clear to auscultation.  No  respiratory distress.  No rales, decreased breath sounds or rhonchi.    Heart: Normal rate.  Regular " rhythm.  S1, S2 normal.  No murmur.   Abdomen: Abdomen is soft.  Bowel sounds are normal, no abdominal tenderness.  There is no rebound or guarding  Extremities: 1+ edema, worsening purplish discoloration bilateral toes, left greater than right and increasing  Neurological: No focal motor or sensory deficits, pupils reactive  Skin:  Warm and dry.  No rash or cyanosis.       Results Review:    Intake/Output:     Intake/Output Summary (Last 24 hours) at 8/17/2023 1317  Last data filed at 8/17/2023 0543  Gross per 24 hour   Intake --   Output 400 ml   Net -400 ml           DATA:  Radiology and Labs:  The following labs independently reviewed by me. Additional labs ordered for tomorrow a.m.  Interval notes, chart personally reviewed by me.   Old records independently reviewed showing baseline creatinine looks to be around 0.6 though creatinine was 1.2 about a month ago  Discussed with patient and her nurse at bedside    Risk/ complexity of medical care/ medical decision making: Moderate complexity, severe electrolyte abnormalities    Labs:   Recent Results (from the past 24 hour(s))   POC Glucose Once    Collection Time: 08/16/23  4:40 PM    Specimen: Blood   Result Value Ref Range    Glucose 68 (L) 70 - 130 mg/dL   POC Glucose Once    Collection Time: 08/16/23  8:31 PM    Specimen: Blood   Result Value Ref Range    Glucose 54 (L) 70 - 130 mg/dL   Renal Function Panel    Collection Time: 08/17/23  6:08 AM    Specimen: Blood   Result Value Ref Range    Glucose 105 (H) 65 - 99 mg/dL    BUN 22 8 - 23 mg/dL    Creatinine 1.51 (H) 0.57 - 1.00 mg/dL    Sodium 145 136 - 145 mmol/L    Potassium 3.9 3.5 - 5.2 mmol/L    Chloride 108 (H) 98 - 107 mmol/L    CO2 23.0 22.0 - 29.0 mmol/L    Calcium 8.1 (L) 8.6 - 10.5 mg/dL    Albumin 2.4 (L) 3.5 - 5.2 g/dL    Phosphorus 4.4 2.5 - 4.5 mg/dL    Anion Gap 14.0 5.0 - 15.0 mmol/L    BUN/Creatinine Ratio 14.6 7.0 - 25.0    eGFR 35.0 (L) >60.0 mL/min/1.73   CBC Auto Differential     Collection Time: 08/17/23  6:08 AM    Specimen: Blood   Result Value Ref Range    WBC 20.61 (H) 3.40 - 10.80 10*3/mm3    RBC 3.36 (L) 3.77 - 5.28 10*6/mm3    Hemoglobin 9.7 (L) 12.0 - 15.9 g/dL    Hematocrit 29.3 (L) 34.0 - 46.6 %    MCV 87.2 79.0 - 97.0 fL    MCH 28.9 26.6 - 33.0 pg    MCHC 33.1 31.5 - 35.7 g/dL    RDW 13.0 12.3 - 15.4 %    RDW-SD 40.6 37.0 - 54.0 fl    MPV 9.9 6.0 - 12.0 fL    Platelets 204 140 - 450 10*3/mm3    Neutrophil % 86.5 (H) 42.7 - 76.0 %    Lymphocyte % 1.8 (L) 19.6 - 45.3 %    Monocyte % 6.9 5.0 - 12.0 %    Eosinophil % 0.0 (L) 0.3 - 6.2 %    Basophil % 0.3 0.0 - 1.5 %    Neutrophils, Absolute 17.82 (H) 1.70 - 7.00 10*3/mm3    Lymphocytes, Absolute 0.37 (L) 0.70 - 3.10 10*3/mm3    Monocytes, Absolute 1.43 (H) 0.10 - 0.90 10*3/mm3    Eosinophils, Absolute 0.00 0.00 - 0.40 10*3/mm3    Basophils, Absolute 0.06 0.00 - 0.20 10*3/mm3   POC Glucose Once    Collection Time: 08/17/23  6:15 AM    Specimen: Blood   Result Value Ref Range    Glucose 98 70 - 130 mg/dL       Radiology:  Pertinent radiology studies were reviewed as described above      Medications have been reviewed separately in chart overview      ASSESSMENT:  ARF, likely contrast nephropathy, worse again today  Hypokalemia, much better after replacement  Metabolic acidosis, largely resolved after bicarb drip  Vomiting with diarrhea, improved  HTN  Severe peripheral vascular disease  hypomagnesemia  hypocalcemia, confirmed by ionized calcium.  Replacing orally and IV           PLAN:   Renal function worse again today, likely contrast nephropathy from recent angiogram  Family continues to discuss goals of care  Palliative care meeting later today  Right now family is leaning toward transition to comfort care  We will hold off on any additional IV fluids at this time  Continue to hold ARB and diuretics  BP remains low and amlodipine was discontinued continue to monitor volume and electrolytes, await family decision regarding goals of  care        Isaac Patel MD  [facility]   Office phone number: [phone]  Answering service phone number: [phone]    08/17/23  13:17 EDT

## 2023-08-17 NOTE — PROGRESS NOTES
Name: Lavern Mcduffie ADMIT: 2023   : 1945  PCP: Ellen Hayes MD    MRN: [id number] LOS: 3 days   AGE/SEX: 77 y.o. female  ROOM: [address]    [place], Subsequent Hospital Care    Chief Complaint   Patient presents with    Abdominal Pain    Diarrhea    Vomiting     CC: Chronic mesenteric ischemia with severe peripheral vascular disease  Subjective     77 y.o. female with severe peripheral vascular disease and a vasculopath in nature with chronic mesenteric ischemia exacerbated by prior Whipple.  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  Her collateral beds were violated during Whipple procedure.  At this point in time she has been off and on suffering from mesenteric ischemic episodes over the last year that are chronic in nature and have caused fear of food and weight loss.  Unfortunately we were not able to intervene at all on the SMA which is severely occluded and does not reconstitute meaningfully until the distal vessels.  Unfortunately her lower extremities also appear to have likely not unreconstructable peripheral vascular disease.  Her whole system seems to be shutting down and she has 10 out of 10 pain in her abdomen with tenderness to light palpation.    Review of Systems feels poorly today with severe pain in the abdomen  Objective     Scheduled Medications:   aspirin, 81 mg, Oral, Daily  cilostazol, 50 mg, Oral, BID AC  famotidine, 40 mg, Oral, BID AC  heparin (porcine), 5,000 Units, Subcutaneous, Q12H  metoprolol succinate XL, 12.5 mg, Oral, Q24H  calcium polycarbophil, 625 mg, Oral, BID  sucralfate, 1 g, Oral, 4x Daily AC & at Bedtime  vitamin D, 50,000 Units, Oral, Q7 Days        Active Infusions:  lactated ringers, 9 mL/hr, Last Rate: 9 mL/hr (08/15/23 1511)        As Needed Medications:    calcium carbonate    Calcium Replacement - Follow Nurse / BPA Driven Protocol    diphenhydrAMINE     droperidol **OR** droperidol    ePHEDrine    fentanyl    flumazenil    hydrALAZINE    HYDROcodone-acetaminophen    HYDROcodone-acetaminophen    HYDROcodone-acetaminophen    HYDROmorphone    HYDROmorphone    HYDROmorphone **AND** naloxone    ipratropium-albuterol    labetalol    loperamide    Magnesium Standard Dose Replacement - Follow Nurse / BPA Driven Protocol    naloxone    nitroglycerin    ondansetron    ondansetron    Phosphorus Replacement - Follow Nurse / BPA Driven Protocol    Potassium Replacement - Follow Nurse / BPA Driven Protocol    promethazine **OR** promethazine    [COMPLETED] Insert Peripheral IV **AND** sodium chloride    Vital Signs  Vital Signs Patient Vitals for the past 24 hrs:   BP Temp Temp src Pulse Resp SpO2 Weight   08/17/23 0808 113/66 97.3 øF (36.3 øC) Oral 97 16 -- --   08/17/23 0300 -- -- -- -- -- -- 42.9 kg (94 lb 9.2 oz)   08/16/23 2341 120/61 97.6 øF (36.4 øC) Oral 92 16 94 % --   08/16/23 1905 130/58 97.5 øF (36.4 øC) Oral 89 16 96 % --   08/16/23 1430 -- -- -- -- -- 99 % --   08/16/23 1310 107/70 97.3 øF (36.3 øC) Oral 90 16 -- --       Vital Signs (range)  Temp:  [97.3 øF (36.3 øC)-97.6 øF (36.4 øC)] 97.3 øF (36.3 øC)  Heart Rate:  [89-97] 97  Resp:  [16] 16  BP: (107-130)/(58-70) 113/66  I/O:  I/O last 3 completed shifts:  In: 500 [I.V.:500]  Out: 400 [Urine:400]  I/O:   Intake/Output Summary (Last 24 hours) at 8/17/2023 0814  Last data filed at 8/17/2023 0543  Gross per 24 hour   Intake --   Output 100 ml   Net -100 ml       BMI:  Body mass index is 18.47 kg/mý.    Physical Exam:  Physical Exam   Abdomen soft and scaphoid with rebound and guarding  Right groin clear  Extremities cyanotic at all sites arms and legs    Results Review:     CBC    Results from last 7 days   Lab Units 08/17/23  0608 08/16/23  0600 08/15/23  0617 08/14/23  0528 08/13/23  0439 08/12/23  0637 08/11/23  2029   WBC 10*3/mm3 20.61* 22.06* 20.68* 23.74* 25.98* 24.05* 22.49*   HEMOGLOBIN g/dL 9.7* 8.9*  10.1* 10.5* 10.6* 12.4 12.0   PLATELETS 10*3/mm3 204 195 193 218 231 284 279       BMP   Results from last 7 days   Lab Units 08/17/23  0608 08/16/23  0600 08/15/23  0617 08/14/23  0528 08/13/23  1756 08/13/23  0532 08/12/23  1625   SODIUM mmol/L 145 145 148* 149* 147* 143 144   POTASSIUM mmol/L 3.9 4.0 3.7 4.8 3.1* 2.2* 3.3*   CHLORIDE mmol/L 108* 110* 112* 114* 109* 107 112*   CO2 mmol/L 23.0 22.5 26.4 25.7 28.4 27.0 14.0*   BUN mg/dL 22 15 14 15 20 27* 31*   CREATININE mg/dL 1.51* 1.17* 0.82 0.78 1.02* 1.21* 1.53*   GLUCOSE mg/dL 105* 87 79 72 124* 238* 131*   MAGNESIUM mg/dL  --  2.5* 1.5* 1.8 2.1 1.4*  --    PHOSPHORUS mg/dL 4.4 4.6* 2.9 3.4 1.4*  --   --        Cr Clearance Estimated Creatinine Clearance: 20.5 mL/min (A) (by C-G formula based on SCr of 1.51 mg/dL (H)).  Coag     HbA1C   Lab Results   Component Value Date    HGBA1C 5.10 08/13/2023    HGBA1C 4.2 (L) 08/22/2022    HGBA1C 5.40 02/05/2021     Blood Glucose   Glucose   Date/Time Value Ref Range Status   08/17/2023 0615 98 70 - 130 mg/dL Final   08/16/2023 2031 54 (L) 70 - 130 mg/dL Final   08/16/2023 1640 68 (L) 70 - 130 mg/dL Final   08/16/2023 1311 79 70 - 130 mg/dL Final     Infection   Results from last 7 days   Lab Units 08/11/23  2130 08/11/23  2101   BLOODCX  No growth at 5 days No growth at 5 days       CMP   Results from last 7 days   Lab Units 08/17/23  0608 08/16/23  0600 08/15/23  0617 08/14/23  0528 08/13/23  1756 08/13/23  0532 08/12/23  1625 08/11/23 2029   SODIUM mmol/L 145 145 148* 149* 147* 143 144 134*   POTASSIUM mmol/L 3.9 4.0 3.7 4.8 3.1* 2.2* 3.3* 3.2*   CHLORIDE mmol/L 108* 110* 112* 114* 109* 107 112* 100   CO2 mmol/L 23.0 22.5 26.4 25.7 28.4 27.0 14.0* 11.4*   BUN mg/dL 22 15 14 15 20 27* 31* 32*   CREATININE mg/dL 1.51* 1.17* 0.82 0.78 1.02* 1.21* 1.53* 1.45*   GLUCOSE mg/dL 105* 87 79 72 124* 238* 131* 88   ALBUMIN g/dL 2.4* 2.6*  --   --  1.9* 1.7* 2.0* 2.0*   BILIRUBIN mg/dL  --   --   --   --   --  0.3 0.2 0.4   ALK  PHOS U/L  --   --   --   --   --  157* 180* 186*   AST (SGOT) U/L  --   --   --   --   --  29 37* 44*   ALT (SGPT) U/L  --   --   --   --   --  19 22 22   LIPASE U/L  --   --   --   --   --   --   --  12*       Radiology(recent) No radiology results for the last day    Assessment & Plan     Assessment & Plan      Vomiting and diarrhea    Hypokalemia    Hypomagnesemia      77 y.o. female with severe peripheral vascular disease and associated chronic mesenteric ischemia.  Unclear if she has anything that is really reconstructable.  Her mesentery is not amenable to endovascular repair and we are going to add Pletal to see if we can get improvement in the perfusion to her gut as well as her lower extremities.  She could not tolerate lower extremity arterial testing and at this point I do not believe that there is a reason to pursue it.  I think that her is intestines have at least segmental complete ischemia and her body is going into shock which is promoting the ischemia to the fingers and toes.  [place] here is grim.  I have recommended palliative care and she has agreed.  Her daughter is at bedside and she also agrees.  We will consult palliative care.  I wish there was more we could do for this very nice lady and family.  I believe however that she would not survive major surgery and she does not wish to pursue that.  We will sign off.  We will keep everyone in our prayers      Anthony Smalls MD  08/17/23  08:14 EDT    Please call my office with any question: [phone]    Active Hospital Problems    Diagnosis  POA    **Vomiting and diarrhea [R11.10, R19.7]  Yes    Hypokalemia [E87.6]  Unknown    Hypomagnesemia [E83.42]  Unknown      Resolved Hospital Problems   No resolved problems to display.

## 2023-08-17 NOTE — PLAN OF CARE
Goal Outcome Evaluation:  Plan of Care Reviewed With: patient           Outcome Evaluation: Calm. Very little verbal conversation. Nods head yes and no appropriately.Scheduled meds refused.  Medicated with IV Dilaudid for abd pain. 02 4L NC. Telemetry ST. Awaiting 4 Park bed. Family supportive at bedside.

## 2023-08-17 NOTE — PROGRESS NOTES
Nutrition Services    Patient Name:  Lavern Mcduffie  YOB: 1945  MRN: [id number]  Admit Date:  8/11/2023    Chart reviewed, noted Palliative consult and plans to transfer to inpatient palliative care unit with comfort measures only. Will sign off at this time. Please consult if nutrition services needed.     Electronically signed by:  Sanjuana Alvarez RD  08/17/23 14:21 EDT

## 2023-08-17 NOTE — SIGNIFICANT NOTE
Pt and fam deciding on palliative care, medical decline just since yesterday, will F/U after meeting w/palliative team

## 2023-08-17 NOTE — PLAN OF CARE
Goal Outcome Evaluation:  Plan of Care Reviewed With: patient, daughter        Progress: declining  Outcome Evaluation: Patient  has  been sleeping  all  shift.   No  oral  intake  this  shift.   BUE  and  BLE  remain  cool/cold  to  touch.    Kaplan  catheter to  BSd  with minimal  UOP  noted.  Kaplan  care  done.  Dressings  remain  intact  to  BUE,  Right  groin,  and  BLE. No  weeping  noted  from  wounds.   Remain  on oxygen  at 2l/m.   SR  on  the  monitor.   Nursing  will  continue to monitor.

## 2023-08-17 NOTE — PROGRESS NOTES
.            [facility]    Palliative Care Daily Progress Note   Chief complaint-follow up support for patient/family, hospice referral/discussion, and transfer to comfort care bed/unit    Code Status:   Code Status and Medical Interventions:   Ordered at: 23 1412     Medical Intervention Limits:    NO intubation (DNI)    NO cardioversion     Level Of Support Discussed With:    Patient     Code Status (Patient has no pulse and is not breathing):    No CPR (Do Not Attempt to Resuscitate)     Medical Interventions (Patient has pulse or is breathing):    Limited Support      Advanced Directives: Advance Directive Status: Patient has advance directive, copy in chart   Goals of Care: Ongoing.     S: Medical record reviewed. Events noted.  Patient sleeping, arouses to voice, slightly drowsy. No family present. Ate minimal breakfast. I reviewed labs, medical notes, therapy notes, and MAR. She was unable to tolerate lower extremity arterial testing yesterday. Spoke with GIANNA Richard as well.       1330: Returned to unit to discuss GOC with patient and family          Review of Systems   Constitutional: Positive for decreased appetite and malaise/fatigue.   Cardiovascular:  Positive for cyanosis. Negative for chest pain.   Respiratory:  Positive for shortness of breath.    Gastrointestinal:  Positive for abdominal pain.   Neurological:  Positive for weakness.   Psychiatric/Behavioral:  Negative for depression. The patient is not nervous/anxious.    Pain Assessment  Preferred Pain Scale: number (Numeric Rating Pain Scale)  Nonverbal Indicators of Pain: nonverbal indicators absent  PAINAD Breathin-->normal  PAINAD Negative Vocalization: 0-->none  PAINAD Facial Expression: 0-->smiling or inexpressive  PAINAD Body Language: 0-->relaxed  PAINAD Consolability: 0-->no need to console  PAINAD Score: 0  Pain Location: abdomen  Pain Description: aching  Pain Onset: recent (weeks)  Pain  Duration: weeks  Factors That Aggravate Pain: movement, positioning  Factors That Relieve Pain: medication timing  Lifestyle Changes/Adaptations in Response to Pain: decreased activity level    O:     Intake/Output Summary (Last 24 hours) at 8/17/2023 0909  Last data filed at 8/17/2023 0543  Gross per 24 hour   Intake --   Output 100 ml   Net -100 ml       Diagnostics and current medications: Reviewed.    Current Facility-Administered Medications   Medication Dose Route Frequency Provider Last Rate Last Admin    aspirin chewable tablet 81 mg  81 mg Oral Daily Anthony Smalls MD   81 mg at 08/16/23 0851    calcium carbonate (TUMS) chewable tablet 500 mg (200 mg elemental)  1 tablet Oral TID PRN Nimesh Smith MD        Calcium Replacement - Follow Nurse / BPA Driven Protocol   Does not apply PRN Anthony Smalls MD        cilostazol (PLETAL) tablet 50 mg  50 mg Oral BID AC Anthony Smalls MD   50 mg at 08/17/23 0554    diphenhydrAMINE (BENADRYL) injection 12.5 mg  12.5 mg Intravenous Q15 Min PRN Maykel Wharton MD        droperidol (INAPSINE) injection 0.625 mg  0.625 mg Intravenous Q20 Min PRN Maykel Wharton MD        Or    droperidol (INAPSINE) injection 0.625 mg  0.625 mg Intramuscular Q20 Min PRN Maykel Wharton MD        ePHEDrine injection 5 mg  5 mg Intravenous Once PRN Maykel Wharton MD        famotidine (PEPCID) tablet 40 mg  40 mg Oral BID AC Anthony Smalls MD   40 mg at 08/17/23 0553    fentaNYL citrate (PF) (SUBLIMAZE) injection 25 mcg  25 mcg Intravenous Q5 Min PRN Maykel Wharton MD        flumazenil (ROMAZICON) injection 0.2 mg  0.2 mg Intravenous PRN Maykel Wharton MD        heparin (porcine) 5000 UNIT/ML injection 5,000 Units  5,000 Units Subcutaneous Q12H Anthony Smalls MD   5,000 Units at 08/16/23 2033    hydrALAZINE (APRESOLINE) injection 5 mg  5 mg Intravenous Q10 Min PRN Maykel Wharton MD        HYDROcodone-acetaminophen (NORCO) 5-325 MG  per tablet 1 tablet  1 tablet Oral Once PRN Maykel Wharton MD        HYDROcodone-acetaminophen (NORCO) 7.5-325 MG per tablet 1 tablet  1 tablet Oral Q4H PRN Anthony Smalls MD   1 tablet at 08/16/23 0701    HYDROcodone-acetaminophen (NORCO) 7.5-325 MG per tablet 1 tablet  1 tablet Oral Q4H PRN Maykel Wharton MD        HYDROmorphone (DILAUDID) injection 0.25 mg  0.25 mg Intravenous Q5 Min PRN Maykel Wharton MD   0.25 mg at 08/15/23 2211    HYDROmorphone (DILAUDID) injection 0.5 mg  0.5 mg Intravenous Q4H PRN Anthony Smalls MD   0.5 mg at 08/17/23 0553    HYDROmorphone (DILAUDID) injection 0.5 mg  0.5 mg Intravenous Q2H PRN Anthony Smalls MD   0.5 mg at 08/16/23 1921    And    naloxone (NARCAN) injection 0.4 mg  0.4 mg Intravenous Q5 Min PRN Anthony Smalls MD        ipratropium-albuterol (DUO-NEB) nebulizer solution 3 mL  3 mL Nebulization Once PRN Maykel Wharton MD        labetalol (NORMODYNE,TRANDATE) injection 5 mg  5 mg Intravenous Q5 Min PRN Maykel Wharton MD        lactated ringers infusion  9 mL/hr Intravenous Continuous Anthony Smalls MD 9 mL/hr at 08/15/23 1511 Restarted at 08/15/23 1712    loperamide (IMODIUM) capsule 2 mg  2 mg Oral TID PRN Anthony Smalls MD   2 mg at 08/14/23 1815    Magnesium Standard Dose Replacement - Follow Nurse / BPA Driven Protocol   Does not apply PRN Anthony Smalls MD        metoprolol succinate XL (TOPROL-XL) 24 hr tablet 12.5 mg  12.5 mg Oral Q24H Anthony Smalls MD   12.5 mg at 08/16/23 0851    naloxone (NARCAN) injection 0.2 mg  0.2 mg Intravenous PRN Maykel Wharton MD        nitroglycerin (NITROSTAT) SL tablet 0.4 mg  0.4 mg Sublingual Q5 Min PRN Anthony Smalls MD        ondansetron (ZOFRAN) injection 4 mg  4 mg Intravenous Q6H PRN Anthony Smalls MD   4 mg at 08/13/23 0439    ondansetron (ZOFRAN) injection 4 mg  4 mg Intravenous Once PRN Maykel Wharton MD        Phosphorus Replacement - Follow Nurse /  BPA Driven Protocol   Does not apply PRN Anthony Smalls MD        polycarbophil tablet 625 mg  625 mg Oral BID Anthony Smalls MD   625 mg at 08/16/23 0851    Potassium Replacement - Follow Nurse / BPA Driven Protocol   Does not apply PRN Anthony Smalls MD        promethazine (PHENERGAN) suppository 25 mg  25 mg Rectal Once PRN Maykel Wharton MD        Or    promethazine (PHENERGAN) tablet 25 mg  25 mg Oral Once PRN Maykel Wharton MD        sodium chloride 0.9 % flush 10 mL  10 mL Intravenous PRN Anthony Smalls MD   10 mL at 08/16/23 0853    sucralfate (CARAFATE) tablet 1 g  1 g Oral 4x Daily AC & at Bedtime Anthony Smalls MD   1 g at 08/16/23 1744    vitamin D (ERGOCALCIFEROL) capsule 50,000 Units  50,000 Units Oral Q7 Days Anthony Smalls MD         lactated ringers, 9 mL/hr, Last Rate: 9 mL/hr (08/15/23 1511)        calcium carbonate    Calcium Replacement - Follow Nurse / BPA Driven Protocol    diphenhydrAMINE    droperidol **OR** droperidol    ePHEDrine    fentanyl    flumazenil    hydrALAZINE    HYDROcodone-acetaminophen    HYDROcodone-acetaminophen    HYDROcodone-acetaminophen    HYDROmorphone    HYDROmorphone    HYDROmorphone **AND** naloxone    ipratropium-albuterol    labetalol    loperamide    Magnesium Standard Dose Replacement - Follow Nurse / BPA Driven Protocol    naloxone    nitroglycerin    ondansetron    ondansetron    Phosphorus Replacement - Follow Nurse / BPA Driven Protocol    Potassium Replacement - Follow Nurse / BPA Driven Protocol    promethazine **OR** promethazine    [COMPLETED] Insert Peripheral IV **AND** sodium chloride  Attest that current medications reviewed including but not limited to prescriptions, over-the counter, herbals and vitamin/mineral/dietary (nutritional) supplements for name, route of administration, type, dose and frequency and are current using all immediate resources available at time of dictation.    A:    /66   Pulse 97   Temp  "97.3 øF (36.3 øC) (Oral)   Resp 16   Ht 152.4 cm (60\")   Wt 42.9 kg (94 lb 9.2 oz)   SpO2 94%   BMI 18.47 kg/mý     Constitutional:       General: Sleeping.      Appearance: Not in distress. Chronically ill-appearing.      Interventions: Nasal cannula in place.      Comments: NC 3.5L    Pulmonary:      Effort: Pulmonary effort is normal.      Breath sounds: Normal breath sounds.   Cardiovascular:      PMI at left midclavicular line. Normal rate. Regular rhythm.      Murmurs: There is no murmur.   Edema:     Peripheral edema present.  Abdominal:      Palpations: Abdomen is rigid.      Tenderness: There is abdominal tenderness. There is guarding.   Skin:     Coloration: Skin is cyanotic (bilateral toes and fingers).   Genitourinary:     Comments: F/c with urine noted   Neurological:      Mental Status: Easily aroused.   Psychiatric:         Mood and Affect: Mood and affect normal.         Speech: Speech normal.         Behavior: Behavior normal.         Thought Content: Thought content normal.         Cognition and Memory: Cognition normal.       Patient status: Disease state: No further treatment being pursued.  Functional status: Palliative Performance Scale Score: Performance 40% based on the following measures: Ambulation: Mainly in bed, Activity and Evidence of Disease: Unable to do any work, extensive evidence of disease, Self-Care: Mainly assistance required,  Intake: Normal or reduced, LOC: Full, drowsy or confusion   ECOG Status(3) Capable of limited self-care, confined to bed or chair > 50% of waking hours.  Nutritional status: albumin below 2.5 g/dL. Body mass index is 18.47 kg/mý.  Screening Status/Interventions Data  Psychosocial Needs: neg  Spiritual Needs: neg  Goals of Care/ACP: pos  Goals of Care/ACP Intervened: yes   Palliative Care Acuity Data  Psychosocial Acuity: moderate complexity  Spiritual Acuity: normal complexity    Impression/Problem List:     Acute kidney injury   E-coli of stool "   Lactoferrin positive  Chronic mesenteric ischemia with SMA occlusion and stenosis of ICA  Severe peripheral vascular disease  Pancreatic cancer s/p   whipple and chemoradiation        Recommendations/Plan:  1. Transfer to inpatient palliative care unit to focus on comfort care  2. Comfort measures  3. Stop current medications and lab draws  4. Initiate palliative care order set  5. [facility] consult         2.  Palliative care encounter  8/15/2023- I spoke with patient regarding goals of care. She has a fairly good understanding of her medical conditions, which we reviewed both acute and chronic. She does have a living will on file and her sonJosé Miguel is her healthcare surrogate. Of note, her spouse  about one year ago on inpatient palliative care unit. Shortly after her spouse death she was diagnosed with pancreatic cancer. She underwent Whipple procedure and complete chemo and radiation (last treatment 2023). She did have feeding tube after surgery. She reports she was doing fairly well and resided at home alone and independent with ADLs. She does have the support of five children (José Miguel, Ivette, Rainer and  live locally and Sangita lives in [place]). She understands the current risk for upcoming arteriogram and hopeful it will be successful. Her goal at this time is to be treated for her condition and return to home setting. I did provide information regarding palliative care as well. I will plan to follow up tomorrow to determine outcome of arteriogram. She did have complaints of 10/10 pain in left leg-severe PVD, aching and constant. I informed RN who provided pain medication. She denies anxiety, depression, shortness of breath on assessment. No spiritual concerns identified. I spoke with GIANNA Payne.           2023- I called and spoke with patient son, José Miguel at 0823. He is aware of patient condition. His sister, Sangita will be flying in later today around 2:40 pm. He is going to reach out to his other  siblings and call me back with a time that will work for them to discuss goals of care further and provide information regarding palliative care/[facility] as well.   1120: Patient was off unit for testing. Her son/HCS, José Miguel was at bedside. I provided information regarding palliative care, [facility] and [facility]. He has good  understanding of patient conditions and refers to recent conversation with Dr Smalls earlier this morning. He is now awaiting results of upper and lower extremity doppler studies. He plans to discuss goals further with his mother later today. He has yet to determine time to meet with his siblings but will let me know at his earliest convenience. I spoke with Dr Smith and Hilary KATZ.      2023- I had brief conversation with patient, which we discussed her conversation with Dr Smalls this morning. She is not in favor of surgical interventions due to risk and unable to tolerate arterial testing yesterday. She has decided to forego testing. Her daughter wasn't at bedside at time of discussion but in hospital. I called her and she was with her brother, José Miguel. We met in private setting at their request. They all have a good understanding of conditions and are considering transferring to inpatient palliative care unit as their father  on the unit 2022. They don't feel comfortable with caring for her at home with concerns of pain management. They were reminded of unit structure, visiting policy and potential medications to be utilized. They wish to talk with patient separately and I will return to discuss with all of them together if patient wishes to discuss. I offered spiritual consult and they plan to reach out to their escobedo. She is of [employer] l[employer]. I spoke with GIANNA Richard.     1320: I returned to patient and spoke with her and children (José Miguel, , Loni and Ivette). They have made the  decision to transfer to inpatient palliative care unit to focus on comfort. They are ok with stopping current  medications as well. Ok with palliative care order set to utilized for symptom management as needed. In addition, they are amenable to [facility] consult. I sent message to Dr Smith and discussed with GIANNA Richard.           Thank you for this consult and allowing us to participate in patient's plan of care. Palliative Care Team will continue to follow patient.     Time spent:60 minutes spent reviewing medical and medication records, assessing and examining patient, discussing with family, answering questions, providing some guidance about a plan and documentation of care, and coordinating care with other healthcare members, with > 50% time spent face to face.   30  minutes spent on advance care planning.    Melody Torres, APRN  8/17/2023  09:09 EDT

## 2023-08-17 NOTE — PROGRESS NOTES
"Daily progress note    Primary care physician      Chief complaint  Doing same with no new issues and family at bedside    History of present illness  77-year-old white female with history of pancreatic cancer hypertension hyperlipidemia and peripheral vascular disease brought to the emergency room by the family with severe abdominal pain for last 1 week that she is not eating drinking.  Patient also complained of nausea vomiting after eating and has couple of loose stools.  Patient denies any fever chills black stools blood in the stools.  Patient afraid of eating as it hurts more when she eats.  Patient work-up in ER revealed acute kidney injury and probably has ischemic bowel admitted for management.  Patient is DNR per her wishes.     REVIEW OF SYSTEMS  Unremarkable except generalized weakness     PHYSICAL EXAM  Blood pressure 113/66, pulse 97, temperature 97.3 øF (36.3 øC), temperature source Oral, resp. rate 16, height 152.4 cm (60\"), weight 42.9 kg (94 lb 9.2 oz), SpO2 94 %.    GENERAL: Awake and alert, chronically ill-appearing, cachectic, no acute distress  HENT: nares patent, mucous membranes dry  EYES: no scleral icterus, EOMI  CV: regular rhythm, normal rate, port present right anterior chest wall  RESPIRATORY: normal effort  ABDOMEN: soft,, nondistended, moderate tenderness bowel sounds positive  MUSCULOSKELETAL: no deformity  NEURO: alert, moves all extremities, follows commands, cranial nerves II through XII intact, speech fluent and clear  PSYCH:  calm, cooperative  SKIN: warm, dry     LAB RESULTS  Lab Results (last 24 hours)       Procedure Component Value Units Date/Time    Renal Function Panel [[id number]]  (Abnormal) Collected: 08/17/23 0608    Specimen: Blood Updated: 08/17/23 0645     Glucose 105 mg/dL      BUN 22 mg/dL      Creatinine 1.51 mg/dL      Sodium 145 mmol/L      Potassium 3.9 mmol/L      Chloride 108 mmol/L      CO2 23.0 mmol/L      Calcium 8.1 mg/dL      Albumin 2.4 g/dL     "  Phosphorus 4.4 mg/dL      Anion Gap 14.0 mmol/L      BUN/Creatinine Ratio 14.6     eGFR 35.0 mL/min/1.73     Narrative:      GFR Normal >60  Chronic Kidney Disease <60  Kidney Failure <15    The GFR formula is only valid for adults with stable renal function between ages 18 and 70.    CBC & Differential [[id number]]  (Abnormal) Collected: 08/17/23 0608    Specimen: Blood Updated: 08/17/23 0630    Narrative:      The following orders were created for panel order CBC & Differential.  Procedure                               Abnormality         Status                     ---------                               -----------         ------                     CBC Auto Differential[[id number]]        Abnormal            Final result                 Please view results for these tests on the individual orders.    CBC Auto Differential [[id number]]  (Abnormal) Collected: 08/17/23 0608    Specimen: Blood Updated: 08/17/23 0630     WBC 20.61 10*3/mm3      RBC 3.36 10*6/mm3      Hemoglobin 9.7 g/dL      Hematocrit 29.3 %      MCV 87.2 fL      MCH 28.9 pg      MCHC 33.1 g/dL      RDW 13.0 %      RDW-SD 40.6 fl      MPV 9.9 fL      Platelets 204 10*3/mm3      Neutrophil % 86.5 %      Lymphocyte % 1.8 %      Monocyte % 6.9 %      Eosinophil % 0.0 %      Basophil % 0.3 %      Neutrophils, Absolute 17.82 10*3/mm3      Lymphocytes, Absolute 0.37 10*3/mm3      Monocytes, Absolute 1.43 10*3/mm3      Eosinophils, Absolute 0.00 10*3/mm3      Basophils, Absolute 0.06 10*3/mm3     POC Glucose Once [[id number]]  (Normal) Collected: 08/17/23 0615    Specimen: Blood Updated: 08/17/23 0617     Glucose 98 mg/dL     Blood Culture - Blood, Arm, Left [[id number]]  (Normal) Collected: 08/11/23 2130    Specimen: Blood from Arm, Left Updated: 08/16/23 2147     Blood Culture No growth at 5 days    Narrative:      Less than seven (7) mL's of blood was collected.  Insufficient quantity may yield false negative results.    Blood Culture - Blood, Arm, Right  [[id number]]  (Normal) Collected: 08/11/23 2101    Specimen: Blood from Arm, Right Updated: 08/16/23 2117     Blood Culture No growth at 5 days    POC Glucose Once [[id number]]  (Abnormal) Collected: 08/16/23 2031    Specimen: Blood Updated: 08/16/23 2033     Glucose 54 mg/dL     POC Glucose Once [[id number]]  (Abnormal) Collected: 08/16/23 1640    Specimen: Blood Updated: 08/16/23 1641     Glucose 68 mg/dL     POC Glucose Once [[id number]]  (Normal) Collected: 08/16/23 1311    Specimen: Blood Updated: 08/16/23 1313     Glucose 79 mg/dL           Imaging Results (Last 24 Hours)       ** No results found for the last 24 hours. **            Current Facility-Administered Medications:     aspirin chewable tablet 81 mg, 81 mg, Oral, Daily, Anthony Smalls MD, 81 mg at 08/16/23 0851    calcium carbonate (TUMS) chewable tablet 500 mg (200 mg elemental), 1 tablet, Oral, TID PRN, Nimesh Smith MD    Calcium Replacement - Follow Nurse / BPA Driven Protocol, , Does not apply, PRN, Anthony Smalls MD    cilostazol (PLETAL) tablet 50 mg, 50 mg, Oral, BID AC, Anthony Smalls MD, 50 mg at 08/17/23 0554    diphenhydrAMINE (BENADRYL) injection 12.5 mg, 12.5 mg, Intravenous, Q15 Min PRN, Maykel Wharton MD    droperidol (INAPSINE) injection 0.625 mg, 0.625 mg, Intravenous, Q20 Min PRN **OR** droperidol (INAPSINE) injection 0.625 mg, 0.625 mg, Intramuscular, Q20 Min PRN, Maykel Wharton MD    ePHEDrine injection 5 mg, 5 mg, Intravenous, Once PRN, Maykel Wharton MD    famotidine (PEPCID) tablet 40 mg, 40 mg, Oral, BID ACSlim Matthew T, MD, 40 mg at 08/17/23 0553    fentaNYL citrate (PF) (SUBLIMAZE) injection 25 mcg, 25 mcg, Intravenous, Q5 Min PRN, Maykel Wharton MD    flumazenil (ROMAZICON) injection 0.2 mg, 0.2 mg, Intravenous, PRN, Maykel Wharton MD    heparin (porcine) 5000 UNIT/ML injection 5,000 Units, 5,000 Units, Subcutaneous, Q12H, Anthony Smalls MD, 5,000 Units at 08/16/23 2033     hydrALAZINE (APRESOLINE) injection 5 mg, 5 mg, Intravenous, Q10 Min PRN, Maykel Wharton MD    HYDROcodone-acetaminophen (NORCO) 5-325 MG per tablet 1 tablet, 1 tablet, Oral, Once PRN, Maykel Wharton MD    HYDROcodone-acetaminophen (NORCO) 7.5-325 MG per tablet 1 tablet, 1 tablet, Oral, Q4H PRN, Anthony Smalls MD, 1 tablet at 08/16/23 0701    HYDROcodone-acetaminophen (NORCO) 7.5-325 MG per tablet 1 tablet, 1 tablet, Oral, Q4H PRN, Maykel Wharton MD    HYDROmorphone (DILAUDID) injection 0.25 mg, 0.25 mg, Intravenous, Q5 Min PRN, Maykel Wharton MD, 0.25 mg at 08/15/23 2211    HYDROmorphone (DILAUDID) injection 0.5 mg, 0.5 mg, Intravenous, Q4H PRN, Anthony Smalls MD, 0.5 mg at 08/17/23 0553    HYDROmorphone (DILAUDID) injection 0.5 mg, 0.5 mg, Intravenous, Q2H PRN, 0.5 mg at 08/17/23 1215 **AND** naloxone (NARCAN) injection 0.4 mg, 0.4 mg, Intravenous, Q5 Min PRN, Anthony Smalls MD    ipratropium-albuterol (DUO-NEB) nebulizer solution 3 mL, 3 mL, Nebulization, Once PRN, Maykel Wharton MD    labetalol (NORMODYNE,TRANDATE) injection 5 mg, 5 mg, Intravenous, Q5 Min PRN, Maykel Wharton MD    lactated ringers infusion, 9 mL/hr, Intravenous, Continuous, Anthony Smalls MD, Last Rate: 9 mL/hr at 08/15/23 1511, Restarted at 08/15/23 1712    loperamide (IMODIUM) capsule 2 mg, 2 mg, Oral, TID PRN, Anthony Smalls MD, 2 mg at 08/14/23 1815    Magnesium Standard Dose Replacement - Follow Nurse / BPA Driven Protocol, , Does not apply, PRN, Anthony Smalls MD    metoprolol succinate XL (TOPROL-XL) 24 hr tablet 12.5 mg, 12.5 mg, Oral, Q24H, Anthony Smalls MD, 12.5 mg at 08/16/23 0851    naloxone (NARCAN) injection 0.2 mg, 0.2 mg, Intravenous, PRN, Maykel Wharton MD    nitroglycerin (NITROSTAT) SL tablet 0.4 mg, 0.4 mg, Sublingual, Q5 Min PRN, Anthony Smalls MD    ondansetron (ZOFRAN) injection 4 mg, 4 mg, Intravenous, Q6H PRN, Anthony Smalls MD, 4 mg at 08/13/23  0439    ondansetron (ZOFRAN) injection 4 mg, 4 mg, Intravenous, Once PRN, Maykel Wharton MD    Phosphorus Replacement - Follow Nurse / BPA Driven Protocol, , Does not apply, PRN, Anthony Smalls MD    polycarbophil tablet 625 mg, 625 mg, Oral, BID, Anthony Smalls MD, 625 mg at 08/16/23 0851    Potassium Replacement - Follow Nurse / BPA Driven Protocol, , Does not apply, PRN, Anthony Smalls MD    promethazine (PHENERGAN) suppository 25 mg, 25 mg, Rectal, Once PRN **OR** promethazine (PHENERGAN) tablet 25 mg, 25 mg, Oral, Once PRN, Maykel Wharton MD    [COMPLETED] Insert Peripheral IV, , , Once **AND** sodium chloride 0.9 % flush 10 mL, 10 mL, Intravenous, PRN, Anthony Smalls MD, 10 mL at 08/17/23 1216    sucralfate (CARAFATE) tablet 1 g, 1 g, Oral, 4x Daily AC & at Bedtime, Anthony Smalls MD, 1 g at 08/16/23 1744    vitamin D (ERGOCALCIFEROL) capsule 50,000 Units, 50,000 Units, Oral, Q7 Days, Anthony Smalls MD     ASSESSMENT  Superior mesenteric artery occlusion nonsurgical treatment  Acute kidney injury resolved  Abdominal pain with nausea vomiting diarrhea enteropathogenic E. coli positive by GI  Hypokalemia hypomagnesemia replaced  Nonsustained V. tach  Metabolic acidosis resolved  Peripheral artery disease  History of pancreatic cancer    PLAN  CPM  Continue IVF  Continue IV antibiotics  Check bilateral lower extremity arterial study  Symptomatic treatment for abdominal pain nausea vomiting  Gastroenterology nephrology and cardiology to follow patient  Adjust home medications  Stress ulcer DVT prophylaxis  Supportive care  PT/OT  DNR with poor prognosis  Palliative care consult pending  Discussed with family nursing staff  Follow closely further recommendation current hospital course    GENESIS MATTHEW MD    Copied text in this note has been reviewed and is accurate as of 08/17/23

## 2023-08-17 NOTE — PROGRESS NOTES
[facility]  Cardiology Progress Note    Patient Identification:  Name: Lavern Mcduffie  Age: 77 y.o.  Sex: female  :  1945  MRN: [id number]                 Follow Up / Chief Complaint: SVT    Interval History: Remains on 4 L of oxygen.  Resting in bed.  No complaints.        Objective:    Past Medical History:  Past Medical History:   Diagnosis Date    Arthritis     Hyperlipidemia     Hypertension     Right hip pain      Past Surgical History:  Past Surgical History:   Procedure Laterality Date    ARTERIOGRAM MESENTERIC N/A 8/15/2023    Procedure: MESENTERIC  ARTERIOGRAM POSSIBLE SUPERIOR MESENTERIC ARTERY  STENT;  Surgeon: Anthony Smalls MD;  Location: [facility] OR ;  Service: Vascular;  Laterality: N/A;    COLONOSCOPY      INGUINAL HERNIA REPAIR Left     TONSILLECTOMY      TOTAL HIP ARTHROPLASTY Left     TOTAL HIP ARTHROPLASTY Right 2021    Procedure: RIGHT TOTAL HIP ARTHROPLASTY ANTERIOR WITH HANA TABLE;  Surgeon: Sven Rouse MD;  Location: [facility];  Service: Orthopedics;  Laterality: Right;        Social History:   Social History     Tobacco Use    Smoking status: Former     Packs/day: 0.50     Years: 30.00     Pack years: 15.00     Types: Cigarettes     Quit date:      Years since quittin.6    Smokeless tobacco: Never   Substance Use Topics    Alcohol use: Yes     Comment: SOCIALLY      Family History:  Family History   Problem Relation Age of Onset    Heart attack Father     Heart disease Father     Malig Hyperthermia Neg Hx           Allergies:  No Known Allergies  Scheduled Meds:  aspirin, 81 mg, Daily  cilostazol, 50 mg, BID AC  famotidine, 40 mg, BID AC  heparin (porcine), 5,000 Units, Q12H  metoprolol succinate XL, 12.5 mg, Q24H  calcium polycarbophil, 625 mg, BID  sucralfate, 1 g, 4x Daily AC & at Bedtime  vitamin D, 50,000 Units, Q7 Days            INTAKE AND OUTPUT:    Intake/Output Summary (Last 24 hours) at 2023 1123  Last data filed  at 8/17/2023 0543  Gross per 24 hour   Intake --   Output 400 ml   Net -400 ml         Review of Systems:   GI: no  n/v or abd pain  Cardiac: no chest pain or palpitations  Pulmonary: no shortness of breath or cough      Constitutional:  Temp:  [97.3 øF (36.3 øC)-97.6 øF (36.4 øC)] 97.3 øF (36.3 øC)  Heart Rate:  [89-97] 97  Resp:  [16] 16  BP: (107-130)/(58-70) 113/66    Physical Exam:  General:  Appears in no acute distress, resting in bed  Eyes: eom normal no conjunctival drainage  HEENT:  No JVD. Thyroid not visibly enlarged. No mucosal pallor or cyanosis  Respiratory: Respirations regular and unlabored at rest. BBS with good air entry in all fields. No crackles, rubs or wheezes auscultated  Cardiovascular: S1S2 Regular rate and rhythm. No murmur, rub or gallop. No carotid bruits. DP/PT pulses     . No pretibial pitting edema  Gastrointestinal: Abdomen soft, flat, non tender. Bowel sounds present. No hepatosplenomegaly. No ascites  Skin:   Skin warm and dry to touch. No rashes    Neuro: AAO x3 CN II-XII grossly intact  Psych: Mood and affect normal, pleasant and cooperative          I reviewed the patient's new clinical results, and personally reviewed and interpreted the patient's ECG and telemetry data from the last 24 hours      Cardiographics        Echocardiogram:     Interpretation Summary         Left ventricular systolic function is normal. Calculated left ventricular EF = 60.4%    Left ventricular diastolic function was indeterminate.    Estimated right ventricular systolic pressure from tricuspid regurgitation is normal (<35 mmHg).     Interpretation Summary       Findings consistent with a normal ECG stress test.  Left ventricular ejection fraction is normal. (Calculated EF = 60%).  Myocardial perfusion imaging indicates a normal myocardial perfusion study with no evidence of ischemia.  Impressions are consistent with a low risk study.     Asymptomatic for chest pain. ECG is negative for ischemia.  "  Ectopy: rare PVC with exercise, none at baseline and recovery  B/P: Baseline Hypertensive 130/60, Peak (post Lexiscan)128/64 and Recovery:120//64     Pharmacologic study due to inability to tolerate increasing speed and grade of treadmill due to orthopedic and  mobility  Issues, Use a rolling walker for ambulation. Unable to walk on treadmill due to c/o right hip pain and poor balance, however was able to perform low level exercise at the chairside. Tolerance was good.     Supervised by: Dr. Doyle  Lab Review       Results from last 7 days   Lab Units 08/16/23  0600   MAGNESIUM mg/dL 2.5*     Results from last 7 days   Lab Units 08/17/23  0608   SODIUM mmol/L 145   POTASSIUM mmol/L 3.9   BUN mg/dL 22   CREATININE mg/dL 1.51*   CALCIUM mg/dL 8.1*       Results from last 7 days   Lab Units 08/17/23  0608 08/16/23  0600 08/15/23  0617   WBC 10*3/mm3 20.61* 22.06* 20.68*   HEMOGLOBIN g/dL 9.7* 8.9* 10.1*   HEMATOCRIT % 29.3* 26.8* 30.1*   PLATELETS 10*3/mm3 204 195 193             Assessment:  Short burst of SVT  Severe hypokalemia: Resolved  Markedly elevated BNP: Echo stable.  Hypertension  Hyperlipidemia  PVD: severe, vascular following  Pancreatic cancer status post resection a year ago  Left pleural effusion  Hypomagnesia: being replaced by nephology.    Plan:  Echo with EF 60 %, stress test negative.   SR on the monitor.  Palliative to follow.        )8/17/2023  KAIN Terrell/Transcription:   \"Dictated utilizing Dragon dictation\".     "

## 2023-08-17 NOTE — PROGRESS NOTES
[facility]     Progress Note    Patient Name: Lavern Mcduffie  : 1945  MRN: [id number]  Primary Care Physician:  Ellen Hayes MD  Date of admission: 2023    Subjective   Subjective     Chief Complaint:     History of Present Illness  Patient Reports  abdominal pain and weakness, the diarrhea thankfully has resolved. Has some abdominal pain and pain in the extremities    Review of Systems   Gastrointestinal:  Positive for abdominal pain.   Musculoskeletal:  Positive for arthralgias.   Neurological:  Positive for weakness.     Objective   Objective     Vitals:   Temp:  [97.3 øF (36.3 øC)-97.6 øF (36.4 øC)] 97.6 øF (36.4 øC)  Heart Rate:  [89-92] 92  Resp:  [16] 16  BP: (107-130)/(58-70) 120/61  Flow (L/min):  [2-4] 4    Physical Exam  Constitutional:       Appearance: She is ill-appearing.   HENT:      Right Ear: External ear normal.      Left Ear: External ear normal.   Eyes:      Conjunctiva/sclera: Conjunctivae normal.   Pulmonary:      Effort: Pulmonary effort is normal.   Abdominal:      General: Abdomen is flat.   Musculoskeletal:         General: Tenderness present.      Right lower leg: Edema present.   Skin:     General: Skin is warm.   Neurological:      General: No focal deficit present.      Mental Status: She is alert.   Psychiatric:         Mood and Affect: Mood normal.        Result Review    Result Review:  I have personally reviewed the results from the time of this admission to 2023 08:02 EDT and agree with these findings:  []  Laboratory list / accordion  []  Microbiology  []  Radiology  []  EKG/Telemetry   []  Cardiology/Vascular   []  Pathology  []  Old records  []  Other:  Most notable findings include:      Assessment & Plan   Assessment / Plan     Brief Patient Summary:  Lavern Mcduffie is a 77 y.o. 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   Prognosis for meaningful recovery is nil, considering palliative care.        DVT prophylaxis:  Medical and mechanical DVT prophylaxis orders are present.    CODE STATUS:    Medical Intervention Limits: NO intubation (DNI); NO cardioversion  Level Of Support Discussed With: Patient  Code Status (Patient has no pulse and is not breathing): No CPR (Do Not Attempt to Resuscitate)  Medical Interventions (Patient has pulse or is breathing): Limited Support    Disposition:  I expect patient to be discharged uncertain.    Dominik Arnold MD

## 2023-08-18 NOTE — PROGRESS NOTES
"[facility]  Cardiology Progress Note    Patient Identification:  Name: Lavern Mcduffie  Age: 77 y.o.  Sex: female  :  1945  MRN: [id number]             Patient now comfort measures on palliative unit. Cardiology will sign off, please call with any concerns.     )2023  KAIN Gonzalez      EMR Dragon/Transcription:   \"Dictated utilizing Dragon dictation\".     "

## 2023-08-18 NOTE — DISCHARGE SUMMARY
Discharge summary    Date of admission 2023  Date of death 2023    Discussion  77-year white female with history of pancreatic cancer hypertension hyperlipidemia and peripheral vascular disease admitted to emergency room with severe abdominal pain for last 1 week with no appetite and nausea vomiting.  Patient admitted and treated with IV fluid IV antibiotic and symptomatic treatment for abdominal pain nausea vomiting and further evaluated by vascular surgery and also followed by gastroenterology and nephrology and her work-up confirmed ischemic bowel with superior mesenteric artery occlusion and not a surgical candidate.  Patient further evaluated by palliative care nurse and after multiple discussion family agreed for comfort care and transfer to palliative care unit for comfort care only allow natural death.  Patient  peacefully this morning and her body released with family.  Patient was DNR and her CODE STATUS changed to allow natural death on this admission.    Cause of death cardiopulmonary failure    GENESIS MATTHEW MD

## 2023-08-18 NOTE — PROGRESS NOTES
Case Management Discharge Note      Final Note: The patient  on 23 @ 07:17. CRESCENCIO Pérez RN CCP.    Provided Post Acute Provider List?: N/A  Provided Post Acute Provider Quality & Resource List?: N/A    Selected Continued Care - Discharged on 2023 Admission date: 2023 - Discharge disposition:       Destination    No services have been selected for the patient.                Durable Medical Equipment    No services have been selected for the patient.                Dialysis/Infusion    No services have been selected for the patient.                Home Medical Care    No services have been selected for the patient.                Therapy    No services have been selected for the patient.                Community Resources    No services have been selected for the patient.                Community & DME    No services have been selected for the patient.                         Final Discharge Disposition Code: 20 -

## 2023-08-18 NOTE — PROGRESS NOTES
Discharge Planning Assessment  [facility]     Patient Name: Lavern Mcduffie  MRN: [id number]  Today's Date: 2023    Admit Date: 2023    Plan: tbd   Discharge Needs Assessment    No documentation.                  Discharge Plan       Row Name 23 1540       Plan    Plan Comments The patient transferred to [facility] from [facility] on 23. The patient was palliative. CRESCENCIO Pérez RN CCP    Final Discharge Disposition Code 20 -     Final Note The patient  on 23 @ 07:17. CRESCENCIO Pérez RN CCP.                  Continued Care and Services - Discharged on 2023 Admission date: 2023 - Discharge disposition:       Destination       Service Provider Request Status Selected Services Address Phone Fax Patient Preferred    [facility] Pending - Request Sent N/A [id number] [id number] [phone] --              Home Medical Care       Service Provider Request Status Selected Services Address Phone Fax Patient Preferred    [facility] Declined  We do not accept Dr Car/Ellen Hayes patients N/A [id number], [address] [address] [phone] --                  Expected Discharge Date and Time       Expected Discharge Date Expected Discharge Time    Aug 18, 2023 11:09 AM           Demographic Summary    No documentation.                  Functional Status    No documentation.                  Psychosocial    No documentation.                  Abuse/Neglect    No documentation.                  Legal    No documentation.                  Substance Abuse    No documentation.                  Patient Forms    No documentation.                     Skye Pérez RN

## 2023-08-18 NOTE — PLAN OF CARE
Goal Outcome Evaluation:  Plan of Care Reviewed With: patient        Progress: no change  Outcome Evaluation: Patient transferred to unit for palliative care. Patient given 0.5mg IV Dilaudid as needed for pain with relief of symptoms. Patient unable to answer questions, opens eyes spontaneously or to touch. Patient on 2lpm nc, FC intact, family member present at bedside, will continue to monitor.

## (undated) DEVICE — HANDPIECE SET WITH COAXIAL HIGH FLOW TIP AND SUCTION TUBE: Brand: INTERPULSE

## (undated) DEVICE — MEDI-VAC YANKAUER SUCTION HANDLE W/BULBOUS TIP: Brand: CARDINAL HEALTH

## (undated) DEVICE — Device

## (undated) DEVICE — APPL CHLORAPREP HI/LITE 26ML ORNG

## (undated) DEVICE — GLV SURG BIOGEL LTX PF 8

## (undated) DEVICE — SOL NACL 0.9PCT 1000ML

## (undated) DEVICE — DRSNG SURESITE123 8X12IN

## (undated) DEVICE — SOL ISO/ALC RUB 70PCT 4OZ

## (undated) DEVICE — RADIFOCUS GLIDEWIRE: Brand: GLIDEWIRE

## (undated) DEVICE — VIOLET BRAIDED (POLYGLACTIN 910), SYNTHETIC ABSORBABLE SUTURE: Brand: COATED VICRYL

## (undated) DEVICE — NEEDLE, QUINCKE, 20GX3.5": Brand: MEDLINE

## (undated) DEVICE — SYR LUERLOK 20CC BX/50

## (undated) DEVICE — SUT VIC 0 CT1 36IN J946H

## (undated) DEVICE — EQUIPMENT COVER BAG TYPE 48” X 36” (122CM X 91CM): Brand: EQUIPMENT COVER BAG TYPE

## (undated) DEVICE — TRAP FLD MINIVAC MEGADYNE 100ML

## (undated) DEVICE — PK ANGIO 40

## (undated) DEVICE — PK ANT HIP 40

## (undated) DEVICE — INTROFLXOR CHECKFLO .59 ANL1 5F45

## (undated) DEVICE — CATH GUIDE SOFTVU SELECT/V HT OMNI .038 5F 65CM

## (undated) DEVICE — SYRINGE KIT,PACKAGED,,150FT,MK 7(ANGIO-ARTERION, 150ML SYR KIT W/QFT,MC)(60729385): Brand: MEDRAD® MARK 7 ARTERION DISPOSABLE SYRINGE 150 ML WITH QUICK FILL TUBE

## (undated) DEVICE — ANTIBACTERIAL UNDYED BRAIDED (POLYGLACTIN 910), SYNTHETIC ABSORBABLE SUTURE: Brand: COATED VICRYL

## (undated) DEVICE — CVR PROB 96IN LF STRL

## (undated) DEVICE — MAT FLR ABSORBENT LG 4FT 10 2.5FT

## (undated) DEVICE — GOWN,NON-REINFORCED,SIRUS,SET IN SLV,XXL: Brand: MEDLINE

## (undated) DEVICE — CATH ANGIO TRCN NB BCN .038 5F 65CM RIM

## (undated) DEVICE — GLV SURG SIGNATURE ESSENTIAL PF LTX SZ8

## (undated) DEVICE — RADIFOCUS TORQUE DEVICE MULTI-TORQUE VISE: Brand: RADIFOCUS TORQUE DEVICE

## (undated) DEVICE — 3M™ IOBAN™ 2 ANTIMICROBIAL INCISE DRAPE 6640EZ: Brand: IOBAN™ 2

## (undated) DEVICE — DRSNG SLVR/ANTIBAC PRIMASEAL POST/OP ADHS 3.5X10IN

## (undated) DEVICE — TOWEL,OR,DSP,ST,BLUE,STD,4/PK,20PK/CS: Brand: MEDLINE

## (undated) DEVICE — Device: Brand: D-STAT® DRY SILVER CLEAR TOPICAL HEMOSTAT

## (undated) DEVICE — ADHS SKIN SURG TISS VISC PREMIERPRO EXOFIN HI/VISC FAST/DRY

## (undated) DEVICE — GLV SURG PREMIERPRO ORTHO LTX PF SZ8 BRN

## (undated) DEVICE — PENCL E/S ULTRAVAC TELESCP NOSE HOLSTR 10FT

## (undated) DEVICE — SHEET, DRAPE, SPLIT, STERILE: Brand: MEDLINE

## (undated) DEVICE — GW THRUWAY TPR STR SHRT .014 300CM

## (undated) DEVICE — BIPOLAR SEALER 23-112-1 AQM 6.0: Brand: AQUAMANTYS™

## (undated) DEVICE — SUT SILK 2/0 TIES 18IN A185H

## (undated) DEVICE — GLV SURG BIOGEL M LTX PF 6 1/2